# Patient Record
Sex: MALE | Race: WHITE | NOT HISPANIC OR LATINO | Employment: OTHER | URBAN - METROPOLITAN AREA
[De-identification: names, ages, dates, MRNs, and addresses within clinical notes are randomized per-mention and may not be internally consistent; named-entity substitution may affect disease eponyms.]

---

## 2017-01-09 ENCOUNTER — OUTSIDE FACILITY SERVICE (OUTPATIENT)
Dept: GASTROENTEROLOGY | Facility: CLINIC | Age: 62
End: 2017-01-09

## 2017-01-09 PROCEDURE — 45385 COLONOSCOPY W/LESION REMOVAL: CPT | Performed by: INTERNAL MEDICINE

## 2017-01-09 PROCEDURE — 88305 TISSUE EXAM BY PATHOLOGIST: CPT | Performed by: INTERNAL MEDICINE

## 2017-01-10 ENCOUNTER — LAB REQUISITION (OUTPATIENT)
Dept: LAB | Facility: HOSPITAL | Age: 62
End: 2017-01-10

## 2017-01-10 DIAGNOSIS — D12.3 BENIGN NEOPLASM OF TRANSVERSE COLON: ICD-10-CM

## 2017-01-10 DIAGNOSIS — D12.2 BENIGN NEOPLASM OF ASCENDING COLON: ICD-10-CM

## 2017-01-11 LAB
CYTO UR: NORMAL
LAB AP CASE REPORT: NORMAL
LAB AP CLINICAL INFORMATION: NORMAL
Lab: NORMAL
PATH REPORT.FINAL DX SPEC: NORMAL
PATH REPORT.GROSS SPEC: NORMAL

## 2017-01-23 ENCOUNTER — LAB (OUTPATIENT)
Dept: LAB | Facility: HOSPITAL | Age: 62
End: 2017-01-23
Attending: INTERNAL MEDICINE

## 2017-01-23 ENCOUNTER — CONSULT (OUTPATIENT)
Dept: CARDIOLOGY | Facility: CLINIC | Age: 62
End: 2017-01-23

## 2017-01-23 VITALS
DIASTOLIC BLOOD PRESSURE: 85 MMHG | HEIGHT: 68 IN | HEART RATE: 93 BPM | BODY MASS INDEX: 37.77 KG/M2 | SYSTOLIC BLOOD PRESSURE: 152 MMHG | WEIGHT: 249.2 LBS

## 2017-01-23 DIAGNOSIS — M16.0 PRIMARY OSTEOARTHRITIS OF BOTH HIPS: ICD-10-CM

## 2017-01-23 DIAGNOSIS — R06.02 SHORTNESS OF BREATH: Primary | ICD-10-CM

## 2017-01-23 DIAGNOSIS — Z91.89 AT HIGH RISK FOR PULMONARY EMBOLISM: ICD-10-CM

## 2017-01-23 DIAGNOSIS — I10 ESSENTIAL HYPERTENSION: ICD-10-CM

## 2017-01-23 DIAGNOSIS — E11.8 TYPE 2 DIABETES MELLITUS WITH COMPLICATION, WITHOUT LONG-TERM CURRENT USE OF INSULIN (HCC): ICD-10-CM

## 2017-01-23 DIAGNOSIS — Z72.0 TOBACCO ABUSE: ICD-10-CM

## 2017-01-23 DIAGNOSIS — R06.02 SHORTNESS OF BREATH: ICD-10-CM

## 2017-01-23 DIAGNOSIS — D64.9 ANEMIA, UNSPECIFIED TYPE: ICD-10-CM

## 2017-01-23 DIAGNOSIS — E66.8 MODERATE OBESITY: ICD-10-CM

## 2017-01-23 DIAGNOSIS — J44.9 CHRONIC OBSTRUCTIVE PULMONARY DISEASE, UNSPECIFIED COPD TYPE (HCC): ICD-10-CM

## 2017-01-23 PROBLEM — E66.9 MODERATE OBESITY: Status: ACTIVE | Noted: 2017-01-23

## 2017-01-23 LAB
BNP SERPL-MCNC: 230 PG/ML (ref 0–100)
D DIMER PPP FEU-MCNC: 0.33 MG/L (FEU) (ref 0–0.5)

## 2017-01-23 PROCEDURE — 85379 FIBRIN DEGRADATION QUANT: CPT | Performed by: INTERNAL MEDICINE

## 2017-01-23 PROCEDURE — 93000 ELECTROCARDIOGRAM COMPLETE: CPT | Performed by: INTERNAL MEDICINE

## 2017-01-23 PROCEDURE — 99204 OFFICE O/P NEW MOD 45 MIN: CPT | Performed by: INTERNAL MEDICINE

## 2017-01-23 PROCEDURE — 83880 ASSAY OF NATRIURETIC PEPTIDE: CPT | Performed by: INTERNAL MEDICINE

## 2017-01-23 PROCEDURE — 36415 COLL VENOUS BLD VENIPUNCTURE: CPT

## 2017-01-23 RX ORDER — NIFEDIPINE 30 MG/1
30 TABLET, EXTENDED RELEASE ORAL DAILY
COMMUNITY
End: 2020-04-21

## 2017-01-23 RX ORDER — ATENOLOL 50 MG/1
50 TABLET ORAL DAILY
COMMUNITY
End: 2017-12-16 | Stop reason: ALTCHOICE

## 2017-01-23 RX ORDER — FUROSEMIDE 40 MG/1
40 TABLET ORAL DAILY
COMMUNITY

## 2017-01-23 NOTE — PROGRESS NOTES
Subjective:     Encounter Date:01/23/2017      Patient ID: Damion Llanos is a 61 y.o.  white male, retired/disabled , from [place], [place].    REFERRING PHYSICIAN/INTERNIST:  Konstantin Palm MD  GASTROENTEROLOGIST:  Edwin Lin MD    Chief Complaint:   Chief Complaint   Patient presents with   • Leg Swelling     Problem List:   1. Recent progressive lower extremity edema of uncertain etiology:    A. Recent echocardiogram without definite RV enlargement or mention of pulmonary arterial hypertension without pericardial effusion and acceptable EKG.    B. Apparent recent Doppler imaging of lower extremities with unknown findings (autumn 2016).    C. CCS class I chest pain syndrome with NYHA class II exertional dyspnea and fatigue syndrome.   2. Type 2 diabetes mellitus, on metformin; hemoglobin A1c unknown.   3. Umbilical herniorrhaphy, data deficit.    4. Moderate obesity, BMI 37.9.   5. Severe critical anemia with recent apparent acceptable colonoscopy with polypectomy x2 (January 2017).   6. Chronic tobacco use with probable mild-moderate COPD.   7. Severe osteoarthritis involving lumbosacral and cervical spine, as well as both hips, with disability and chronic pain management.   8. Hypertension, probably essential.    No Known Allergies      Current Outpatient Prescriptions:   •  ALBUTEROL IN, Inhale 2 puffs Every 4 (Four) Hours As Needed., Disp: , Rfl:   •  aspirin 81 MG chewable tablet, Chew 81 mg Daily., Disp: , Rfl:   •  atenolol (TENORMIN) 50 MG tablet, Take 50 mg by mouth Daily., Disp: , Rfl:   •  CYCLOBENZAPRINE HCL PO, Take 10 mg by mouth 2 (Two) Times a Day., Disp: , Rfl:   •  furosemide (LASIX) 40 MG tablet, Take 40 mg by mouth 2 (Two) Times a Day., Disp: , Rfl:   •  hydrALAZINE (APRESOLINE) 50 MG tablet, Take 50 mg by mouth 3 (Three) Times a Day., Disp: , Rfl:   •  HYDROcodone-acetaminophen (NORCO)  MG per tablet, Take 1 tablet by mouth Every 6 (Six) Hours As Needed for  "moderate pain (4-6)., Disp: , Rfl:   •  lisinopril (PRINIVIL,ZESTRIL) 40 MG tablet, Take 40 mg by mouth Daily., Disp: , Rfl:   •  magnesium oxide (MAGOX) 400 (241.3 MG) MG tablet tablet, Take 400 mg by mouth 2 (Two) Times a Day As Needed., Disp: , Rfl:   •  METFORMIN HCL ER PO, Take 500 mg by mouth 2 (Two) Times a Day., Disp: , Rfl:   •  NIFEdipine XL (NIFEDICAL XL) 30 MG 24 hr tablet, Take 30 mg by mouth Daily., Disp: , Rfl:   •  omeprazole (PriLOSEC) 20 MG capsule, Take 20 mg by mouth Daily., Disp: , Rfl:   •  sildenafil (VIAGRA) 100 MG tablet, Take 100 mg by mouth Daily As Needed for erectile dysfunction., Disp: , Rfl:   •  simvastatin (ZOCOR) 20 MG tablet, Take 20 mg by mouth Every Night., Disp: , Rfl:     History of Present Illness Patient presents to our office today for a consult.  He is retired from being a  as a result of back pain and disability; he last worked approximately 8-10 years ago. He has had x-rays and is undergoing the process for disability.  He has had \"probably 4 EKGs\" in Dr. Palm's office during this process, and he has never been told of any problems on his EKG.  He goes for checkups once a month, and he has been having problems with swelling in his legs and his ankles.  He has had ultrasound of his lower extremities but is unaware of the details and date. He was then put on Lasix and potassium and had some blood work drawn that showed him to be anemic and was put on iron.  He underwent a colonoscopy, and this was found to be okay; he did have 2 polyps removed. He has been a \"smoker all my life\" and still smokes; since he was 14 years old.  He tries to take 2 days off a week but smokes three-quarters to a pack per day.  The longest time he has ever been able to quit smoking was for 2 weeks.  His wife has quit smoking as of September 2016; he notes that she has terrible problems with her back.  The patient says that he drinks beer on a daily basis, \"usually with my supper.\"  He has " not had any chest x-rays that he can recall or any lung tests.  He reports that when he was prepping for his colonoscopy and had finished drinking the prep, he went to bed and felt like he was dehydrated; he woke up the next morning and felt tightness in his chest.  He had never had this feeling in his chest before, with activity or with rest.  He notes that he is able to walk about a quarter of a mile before he has to stop due to hip pain. He has no problems with chest tightness or with shortness of breath with that limited activity.  He has no cough and no sputum production. He has had influenza immunization.  There is no prior diagnosis of sleep apnea, and he currently denies cough, sputum production, pleurisy, hemoptysis, or anginal type chest discomfort.  He states he has gained approximately 25 pounds in weight over the past 2-3 years.    Cardiovascular Disease Risk Factors  hyptertension, hyperlipidemia, diabetes mellitus, tobacco abuse, obesity, increased age, male gender    Social History     Social History   • Marital status:      Spouse name: N/A   • Number of children: N/A   • Years of education: N/A     Occupational History   • Not on file.     Social History Main Topics   • Smoking status: Current Every Day Smoker     Packs/day: 1.00   • Smokeless tobacco: Not on file   • Alcohol use 4.2 oz/week     7 Cans of beer per week   • Drug use: No   • Sexual activity: Not on file     Other Topics Concern   • Not on file     Social History Narrative   Went through 8th grade in school.    Family History   Problem Relation Age of Onset   • Family history unknown: Yes   Family history of diabetes mellitus, dyslipidemia, and hypertension.    ROS   Obtained and negative except as outlined in problem list and HPI.      ECG 12 Lead  Date/Time: 1/23/2017 2:33 PM  Performed by: TELLO BOLDEN  Authorized by: TELLO BOLDEN   Comparison: not compared with previous ECG   Rhythm: sinus tachycardia  Clinical  "impression: abnormal ECG  Comments: QTc 446  ms                Objective:       Vitals:    01/23/17 1049 01/23/17 1134 01/23/17 1136 01/23/17 1137   BP: 150/74 146/85 133/82 152/85   BP Location: Left arm  Right arm Right arm   Patient Position: Sitting  Standing Sitting   Pulse: 106 95 95 93   Weight: 249 lb 3.2 oz (113 kg)      Height: 68\" (172.7 cm)        Body mass index is 37.89 kg/(m^2).     Physical Exam   Constitutional: He appears well-developed and well-nourished.   HENT:   Head: Normocephalic and atraumatic.   Mouth/Throat: Oropharynx is clear and moist.   Edentulous   Eyes:   Fundoscopic exam:       The right eye shows AV nicking. The right eye shows no exudate.        The left eye shows AV nicking. The left eye shows no exudate.   Neck: Neck supple. No JVD present. Carotid bruit is not present. No thyromegaly present.   Cardiovascular: Normal rate and regular rhythm.  Exam reveals no gallop, no S3 and no friction rub.    Murmur heard.   Medium-pitched early systolic murmur is present with a grade of 1/6  at the lower left sternal border  Pulses:       Carotid pulses are 1+ on the right side, and 1+ on the left side.       Radial pulses are 1+ on the right side, and 1+ on the left side.        Femoral pulses are 1+ on the right side, and 1+ on the left side.       Popliteal pulses are 1+ on the right side, and 1+ on the left side.        Dorsalis pedis pulses are 1+ on the right side, and 1+ on the left side.        Posterior tibial pulses are 1+ on the right side, and 1+ on the left side.   Pulmonary/Chest: Effort normal and breath sounds normal.   Abdominal: Soft. He exhibits no mass. There is no hepatosplenomegaly. There is no tenderness.   Musculoskeletal: He exhibits edema (1+ bipedal).   Lymphadenopathy:     He has no cervical adenopathy.   Neurological: He is alert. He has normal strength. No cranial nerve deficit or sensory deficit.   Skin: Skin is warm, dry and intact.   Mild to moderate tobacco " smell       Lab Review:   Results for orders placed or performed in visit on 01/09/17   Tissue Exam   Result Value Ref Range    Case Report       Surgical Pathology Report                         Case: [id number]                                  Authorizing Provider:  Edwin Lin MD     Collected:           01/09/2017 01:31 PM          Pathologist:           Daron Wild MD       Received:            01/10/2017 08:36 AM          Specimens:   1) - Large Intestine, Right / Ascending Colon                                                       2) - Large Intestine, Transverse Colon                                                     Clinical Information       The working history is anemia, screening colonoscopy.       Final Diagnosis       1. ASCENDING COLON POLYP:  Serrated polyp with features compatible with sessile serrated adenoma.   No high-grade dysplasia identified.  2. TRANSVERSE COLON POLYP:  Serrated polyp with features compatible with sessile serrated adenoma.   No high-grade dysplasia identified.   [facility]       Gross Description       Specimen 1 received in formalin labeled as ascending colon polyp is a 0.8 x 0.6 x 0.6 cm red/brown polyp, which is bisected and submitted entirely in cassette 1.      Specimen 2 received in formalin labeled as transverse colon polyp is a 0.8 x 0.8 x 0.6 cm red/brown polyp, which is bisected and submitted entirely in cassette 2.  [facility]       Microscopic Description       The slides are reviewed and demonstrate histopathologic features supporting the above rendered diagnosis.          Embedded Images       Laboratory results, 10/26/2016:  · CBC - WBC 5.01, hemoglobin 8.2, hematocrit 28.2, platelets 444  · Ferritin - 21.00  · Iron 268, TIBC 473, iron saturation 57  · Folate - 20.35  · Vitamin B12 - 405  · CMP - glucose 89, BUN 17, creatinine 0.9, sodium 143, potassium 5.6, calcium 9.7, albumin 5.10, ALT 34, AST 39  · Transferrin Receptor - 53.4    Echocardiogram by  Dr. Korin Rinaldi, 09/29/2016:  1. Left ventricle normal size with normal wall motion.  2. Left ventricular ejection fraction 60%.  3. Left atrial dilatation.  4. Right atrium, right ventricle normal size.  5. Mitral valve normal without stenosis or regurgitation.  6. Trileaflet aortic valve without stenosis or regurgitation.  7. Tricuspid valve normal without stenosis or regurgitation.  8. Pulmonary valve normal without stenosis or regurgitation.  9. Color doppler revealed normal blood flow pattern.  10. Prominent A wave on mitral and tricuspid flow pattern suggestive of diastolic dysfunction.  11. No pericardial effusion.      Assessment:   Patient has had recent severe anemia and pedal edema; I suspect his pedal edema is multifactorial and not related to pulmonary arterial hypertension.  I suspect the anemia, his obesity, sedentary lifestyle, and nifedipine are producing pedal edema, which apparently has improved following initiation of diuretic therapy.  He has significant hypertension, which is under fair control.  It is imperative that he not smoke, and I have had a long discussion wit him about this imperative, as well as the need to consider adjunctive therapy to limit his urge, such as Chantix, Wellbutrin, or nicotine replacement products.  His echocardiogram report is not markedly abnormal, and he has no symptoms currently to suggest orthopnea,  PND, angina, or important arrhythmia.  I do feel he needs screening for obstructive sleep apnea. I do not feel at this time that he needs right heart catheterization or diagnostic coronary angiography.  We will attempt to assess for chronic thromboembolic disease, as well as interstitial lung disease, as well as COPD with thoracic CTA.  He additionally  Needs a baseline assessment of his pulmonary function with pulmonary function testing.  If his peripheral edema becomes progressive or recurrent, we will alter his nifedipine XL.  Finally, his atenolol is  relatively contraindicated with his lung dysfunction, as well also his diabetes mellitus.  Hopefully, his lipids remain under acceptable control.  Finally ,in view of his severe anemia, he needs hematology evaluation and consideration of hemoglobin electrophoresis, as well as possible bone marrow examination.  His iron hemodynamics are of concern and suggestive possible tendency for hemochromatosis; this needs to be addressed by his gastroenterologist, as well as possible hematologist.     Diagnosis Plan   1. Shortness of breath  BNP    D-dimer, Quantitative    CT chest w contrast    Pulmonary Function Test   2. At high risk for pulmonary embolism  BNP    D-dimer, Quantitative    CT chest w contrast    Pulmonary Function Test   3. Anemia, unspecified type     4. Moderate obesity     5. Tobacco abuse     6. Chronic obstructive pulmonary disease, unspecified COPD type     7. Essential hypertension     8. Primary osteoarthritis of both hips     9. Type 2 diabetes mellitus with complication, without long-term current use of insulin            Plan:   1. Continue current medications.  2. Outpatient screening for obstructive sleep apnea with nocturnal oximetry monitoring.  3. Strong consideration of hematology consultation.  4. Tobacco cessation discussed at length.  5. Moderate compression knee-high MANDY hose.  6. Attempt to obtain and review lower extremity imaging studies/reports.  7. Consideration of Prevnar immunization.  8. Following outpatient studies are recommended:   A. BNP   B. D-dimer   C. Pulmonary function test   D. Thoracic CTA  9. 1-800 card provided.  10. Return for reassessment in 3-4 weeks.        Transcribed by Tiffanie Rogers for Dr. Tobias Hennessy at 11:07 AM on 01/23/2017    ITobias MD, [facility], personally performed the services described in this documentation as scribed by the above named individual in my presence, and it is both accurate and complete. At 11:07 AM on 01/23/2017

## 2017-01-23 NOTE — MR AVS SNAPSHOT
Damion Llanos   1/23/2017 11:00 AM   Consult    Dept Phone:  [phone]   Encounter #:  [id number]    Provider:  Tobias Hennessy MD   Department:  [facility] CARDIOLOGY                Your Full Care Plan              Today's Medication Changes          These changes are accurate as of: 1/23/17 11:40 AM.  If you have any questions, ask your nurse or doctor.               Stop taking medication(s)listed here:     AMLODIPINE BESYLATE PO   Stopped by:  Tobias Hennessy MD           gabapentin 300 MG capsule   Commonly known as:  NEURONTIN   Stopped by:  Tobias Hennessy MD           glyBURIDE 5 MG tablet   Commonly known as:  DIAbeta   Stopped by:  Tobias Hennessy MD           Iron 325 (65 FE) MG tablet   Stopped by:  Tobias Hennessy MD           PA VITAMIN D-3 5000 UNITS capsule   Generic drug:  vitamin d   Stopped by:  Tobias Hennessy MD           vitamin D3 5000 UNITS capsule capsule   Stopped by:  Tobias Hennessy MD                      Your Updated Medication List          This list is accurate as of: 1/23/17 11:40 AM.  Always use your most recent med list.                ALBUTEROL IN       aspirin 81 MG chewable tablet       atenolol 50 MG tablet   Commonly known as:  TENORMIN       CYCLOBENZAPRINE HCL PO       furosemide 40 MG tablet   Commonly known as:  LASIX       hydrALAZINE 50 MG tablet   Commonly known as:  APRESOLINE       HYDROcodone-acetaminophen  MG per tablet   Commonly known as:  NORCO       lisinopril 40 MG tablet   Commonly known as:  PRINIVIL,ZESTRIL       magnesium oxide 400 (241.3 MG) MG tablet tablet   Commonly known as:  MAGOX       METFORMIN HCL ER PO       NIFEDICAL XL 30 MG 24 hr tablet   Generic drug:  NIFEdipine XL       omeprazole 20 MG capsule   Commonly known as:  priLOSEC       simvastatin 20 MG tablet   Commonly known as:  ZOCOR       VIAGRA 100 MG tablet   Generic drug:  sildenafil               You Were Diagnosed With        "   Codes Comments    Shortness of breath    -  Primary ICD-10-CM: R06.02  ICD-9-CM: 786.05     At high risk for pulmonary embolism     ICD-10-CM: Z91.89  ICD-9-CM: V49.89       Instructions     None    Patient Instructions History      Upcoming Appointments     Visit Type Date Time Department    CONSULT 1/23/2017 11:00 AM [facility]      MyChart Signup     Our records indicate that you have declined [employer] MyChart signup. If you would like to sign up for [employer], please email [email or web address] or call [phone] to obtain an activation code.             Other Info from Your Visit           Allergies     No Known Allergies      Reason for Visit     Leg Swelling           Vital Signs     Blood Pressure Pulse Height Weight Body Mass Index Smoking Status    152/85 (BP Location: Right arm, Patient Position: Sitting) 93 68\" (172.7 cm) 249 lb 3.2 oz (113 kg) 37.89 kg/m2 Current Every Day Smoker      Problems and Diagnoses Noted     Shortness of breath    -  Primary    At high risk for pulmonary embolism            "

## 2017-02-08 ENCOUNTER — APPOINTMENT (OUTPATIENT)
Dept: PULMONOLOGY | Facility: HOSPITAL | Age: 62
End: 2017-02-08
Attending: INTERNAL MEDICINE

## 2017-02-22 ENCOUNTER — HOSPITAL ENCOUNTER (OUTPATIENT)
Dept: CT IMAGING | Facility: HOSPITAL | Age: 62
Discharge: HOME | End: 2017-02-22
Attending: INTERNAL MEDICINE | Admitting: INTERNAL MEDICINE

## 2017-02-22 DIAGNOSIS — R06.02 SHORTNESS OF BREATH: ICD-10-CM

## 2017-02-22 DIAGNOSIS — Z91.89 AT HIGH RISK FOR PULMONARY EMBOLISM: ICD-10-CM

## 2017-02-22 PROCEDURE — 71260 CT THORAX DX C+: CPT

## 2017-02-22 PROCEDURE — 82565 ASSAY OF CREATININE: CPT

## 2017-02-22 PROCEDURE — 0 IOPAMIDOL PER 1 ML: Performed by: INTERNAL MEDICINE

## 2017-02-22 RX ADMIN — IOPAMIDOL 90 ML: 755 INJECTION, SOLUTION INTRAVENOUS at 16:00

## 2017-02-24 LAB — CREAT BLDA-MCNC: 1.4 MG/DL (ref 0.6–1.3)

## 2017-12-16 ENCOUNTER — APPOINTMENT (OUTPATIENT)
Dept: GENERAL RADIOLOGY | Facility: HOSPITAL | Age: 62
End: 2017-12-16

## 2017-12-16 ENCOUNTER — HOSPITAL ENCOUNTER (INPATIENT)
Facility: HOSPITAL | Age: 62
LOS: 1 days | Discharge: HOME | End: 2017-12-17
Attending: EMERGENCY MEDICINE | Admitting: FAMILY MEDICINE

## 2017-12-16 DIAGNOSIS — R60.1 ANASARCA: ICD-10-CM

## 2017-12-16 DIAGNOSIS — D50.0 CHRONIC BLOOD LOSS ANEMIA: ICD-10-CM

## 2017-12-16 DIAGNOSIS — D64.9 SYMPTOMATIC ANEMIA: Primary | ICD-10-CM

## 2017-12-16 DIAGNOSIS — D64.9 SYMPTOMATIC ANEMIA: ICD-10-CM

## 2017-12-16 PROBLEM — J44.1 CHRONIC OBSTRUCTIVE PULMONARY DISEASE WITH (ACUTE) EXACERBATION (HCC): Status: ACTIVE | Noted: 2017-01-23

## 2017-12-16 PROBLEM — R60.9 DEPENDENT EDEMA: Status: ACTIVE | Noted: 2017-12-16

## 2017-12-16 PROBLEM — Z91.89 AT HIGH RISK FOR PULMONARY EMBOLISM: Status: RESOLVED | Noted: 2017-01-23 | Resolved: 2017-12-16

## 2017-12-16 LAB
ABO GROUP BLD: NORMAL
ABO GROUP BLD: NORMAL
ALBUMIN SERPL-MCNC: 4.1 G/DL (ref 3.2–4.8)
ALBUMIN/GLOB SERPL: 1.7 G/DL (ref 1.5–2.5)
ALP SERPL-CCNC: 82 U/L (ref 25–100)
ALT SERPL W P-5'-P-CCNC: 19 U/L (ref 7–40)
ANION GAP SERPL CALCULATED.3IONS-SCNC: 7 MMOL/L (ref 3–11)
AST SERPL-CCNC: 28 U/L (ref 0–33)
BASOPHILS # BLD AUTO: 0.03 10*3/MM3 (ref 0–0.2)
BASOPHILS NFR BLD AUTO: 0.4 % (ref 0–1)
BILIRUB SERPL-MCNC: 0.4 MG/DL (ref 0.3–1.2)
BLD GP AB SCN SERPL QL: NEGATIVE
BNP SERPL-MCNC: 131 PG/ML (ref 0–100)
BUN BLD-MCNC: 22 MG/DL (ref 9–23)
BUN/CREAT SERPL: 22 (ref 7–25)
CALCIUM SPEC-SCNC: 9 MG/DL (ref 8.7–10.4)
CHLORIDE SERPL-SCNC: 103 MMOL/L (ref 99–109)
CO2 SERPL-SCNC: 26 MMOL/L (ref 20–31)
CREAT BLD-MCNC: 1 MG/DL (ref 0.6–1.3)
DEPRECATED RDW RBC AUTO: 47.5 FL (ref 37–54)
EOSINOPHIL # BLD AUTO: 0.23 10*3/MM3 (ref 0–0.3)
EOSINOPHIL NFR BLD AUTO: 3 % (ref 0–3)
ERYTHROCYTE [DISTWIDTH] IN BLOOD BY AUTOMATED COUNT: 18.2 % (ref 11.3–14.5)
FERRITIN SERPL-MCNC: 2 NG/ML (ref 22–322)
FOLATE SERPL-MCNC: 17.97 NG/ML (ref 3.2–20)
GFR SERPL CREATININE-BSD FRML MDRD: 75 ML/MIN/1.73
GLOBULIN UR ELPH-MCNC: 2.4 GM/DL
GLUCOSE BLD-MCNC: 98 MG/DL (ref 70–100)
GLUCOSE BLDC GLUCOMTR-MCNC: 119 MG/DL (ref 70–130)
GLUCOSE BLDC GLUCOMTR-MCNC: 162 MG/DL (ref 70–130)
GLUCOSE BLDC GLUCOMTR-MCNC: 200 MG/DL (ref 70–130)
HCT VFR BLD AUTO: 20.7 % (ref 38.9–50.9)
HGB BLD-MCNC: 5.2 G/DL (ref 13.1–17.5)
HOLD SPECIMEN: NORMAL
HOLD SPECIMEN: NORMAL
HYPOCHROMIA BLD QL: NORMAL
IMM GRANULOCYTES # BLD: 0.03 10*3/MM3 (ref 0–0.03)
IMM GRANULOCYTES NFR BLD: 0.4 % (ref 0–0.6)
IRON 24H UR-MRATE: 7 MCG/DL (ref 50–175)
IRON SATN MFR SERPL: 1 % (ref 20–50)
LYMPHOCYTES # BLD AUTO: 1.14 10*3/MM3 (ref 0.6–4.8)
LYMPHOCYTES NFR BLD AUTO: 14.9 % (ref 24–44)
MAGNESIUM SERPL-MCNC: 1.6 MG/DL (ref 1.3–2.7)
MCH RBC QN AUTO: 18.1 PG (ref 27–31)
MCHC RBC AUTO-ENTMCNC: 25.1 G/DL (ref 32–36)
MCV RBC AUTO: 71.9 FL (ref 80–99)
MICROCYTES BLD QL: NORMAL
MONOCYTES # BLD AUTO: 0.74 10*3/MM3 (ref 0–1)
MONOCYTES NFR BLD AUTO: 9.7 % (ref 0–12)
NEUTROPHILS # BLD AUTO: 5.47 10*3/MM3 (ref 1.5–8.3)
NEUTROPHILS NFR BLD AUTO: 71.6 % (ref 41–71)
PLAT MORPH BLD: NORMAL
PLATELET # BLD AUTO: 404 10*3/MM3 (ref 150–450)
PMV BLD AUTO: 9.9 FL (ref 6–12)
POTASSIUM BLD-SCNC: 4.5 MMOL/L (ref 3.5–5.5)
PROT SERPL-MCNC: 6.5 G/DL (ref 5.7–8.2)
RBC # BLD AUTO: 2.88 10*6/MM3 (ref 4.2–5.76)
RH BLD: POSITIVE
RH BLD: POSITIVE
SODIUM BLD-SCNC: 136 MMOL/L (ref 132–146)
TIBC SERPL-MCNC: 495 MCG/DL (ref 250–450)
TROPONIN I SERPL-MCNC: 0.04 NG/ML (ref 0–0.07)
TSH SERPL DL<=0.05 MIU/L-ACNC: 1.6 MIU/ML (ref 0.35–5.35)
VIT B12 BLD-MCNC: 461 PG/ML (ref 211–911)
WBC MORPH BLD: NORMAL
WBC NRBC COR # BLD: 7.64 10*3/MM3 (ref 3.5–10.8)
WHOLE BLOOD HOLD SPECIMEN: NORMAL
WHOLE BLOOD HOLD SPECIMEN: NORMAL

## 2017-12-16 PROCEDURE — 86901 BLOOD TYPING SEROLOGIC RH(D): CPT

## 2017-12-16 PROCEDURE — 82728 ASSAY OF FERRITIN: CPT | Performed by: FAMILY MEDICINE

## 2017-12-16 PROCEDURE — 83735 ASSAY OF MAGNESIUM: CPT | Performed by: FAMILY MEDICINE

## 2017-12-16 PROCEDURE — 86901 BLOOD TYPING SEROLOGIC RH(D): CPT | Performed by: EMERGENCY MEDICINE

## 2017-12-16 PROCEDURE — 82607 VITAMIN B-12: CPT | Performed by: FAMILY MEDICINE

## 2017-12-16 PROCEDURE — 99253 IP/OBS CNSLTJ NEW/EST LOW 45: CPT | Performed by: INTERNAL MEDICINE

## 2017-12-16 PROCEDURE — 63710000001 INSULIN LISPRO (HUMAN) PER 5 UNITS: Performed by: FAMILY MEDICINE

## 2017-12-16 PROCEDURE — 36430 TRANSFUSION BLD/BLD COMPNT: CPT

## 2017-12-16 PROCEDURE — 83550 IRON BINDING TEST: CPT | Performed by: FAMILY MEDICINE

## 2017-12-16 PROCEDURE — 86850 RBC ANTIBODY SCREEN: CPT | Performed by: EMERGENCY MEDICINE

## 2017-12-16 PROCEDURE — 85007 BL SMEAR W/DIFF WBC COUNT: CPT | Performed by: EMERGENCY MEDICINE

## 2017-12-16 PROCEDURE — P9016 RBC LEUKOCYTES REDUCED: HCPCS

## 2017-12-16 PROCEDURE — 86923 COMPATIBILITY TEST ELECTRIC: CPT

## 2017-12-16 PROCEDURE — 83036 HEMOGLOBIN GLYCOSYLATED A1C: CPT | Performed by: FAMILY MEDICINE

## 2017-12-16 PROCEDURE — 93005 ELECTROCARDIOGRAM TRACING: CPT | Performed by: EMERGENCY MEDICINE

## 2017-12-16 PROCEDURE — 85660 RBC SICKLE CELL TEST: CPT | Performed by: FAMILY MEDICINE

## 2017-12-16 PROCEDURE — 84484 ASSAY OF TROPONIN QUANT: CPT

## 2017-12-16 PROCEDURE — 82962 GLUCOSE BLOOD TEST: CPT

## 2017-12-16 PROCEDURE — 82746 ASSAY OF FOLIC ACID SERUM: CPT | Performed by: FAMILY MEDICINE

## 2017-12-16 PROCEDURE — 83540 ASSAY OF IRON: CPT | Performed by: FAMILY MEDICINE

## 2017-12-16 PROCEDURE — 83021 HEMOGLOBIN CHROMOTOGRAPHY: CPT | Performed by: FAMILY MEDICINE

## 2017-12-16 PROCEDURE — 99223 1ST HOSP IP/OBS HIGH 75: CPT | Performed by: FAMILY MEDICINE

## 2017-12-16 PROCEDURE — 83880 ASSAY OF NATRIURETIC PEPTIDE: CPT | Performed by: EMERGENCY MEDICINE

## 2017-12-16 PROCEDURE — 80050 GENERAL HEALTH PANEL: CPT | Performed by: EMERGENCY MEDICINE

## 2017-12-16 PROCEDURE — 36415 COLL VENOUS BLD VENIPUNCTURE: CPT

## 2017-12-16 PROCEDURE — 99284 EMERGENCY DEPT VISIT MOD MDM: CPT

## 2017-12-16 PROCEDURE — 86900 BLOOD TYPING SEROLOGIC ABO: CPT | Performed by: EMERGENCY MEDICINE

## 2017-12-16 PROCEDURE — 71020 HC CHEST PA AND LATERAL: CPT

## 2017-12-16 PROCEDURE — 86900 BLOOD TYPING SEROLOGIC ABO: CPT

## 2017-12-16 RX ORDER — CARVEDILOL 12.5 MG/1
12.5 TABLET ORAL 2 TIMES DAILY WITH MEALS
COMMUNITY

## 2017-12-16 RX ORDER — FUROSEMIDE 40 MG/1
40 TABLET ORAL 2 TIMES DAILY
Status: DISCONTINUED | OUTPATIENT
Start: 2017-12-16 | End: 2017-12-17 | Stop reason: HOSPADM

## 2017-12-16 RX ORDER — IPRATROPIUM BROMIDE AND ALBUTEROL SULFATE 2.5; .5 MG/3ML; MG/3ML
3 SOLUTION RESPIRATORY (INHALATION) 4 TIMES DAILY PRN
Status: DISCONTINUED | OUTPATIENT
Start: 2017-12-16 | End: 2017-12-17 | Stop reason: HOSPADM

## 2017-12-16 RX ORDER — ACETAMINOPHEN, ASPIRIN AND CAFFEINE 250; 250; 65 MG/1; MG/1; MG/1
1 TABLET, FILM COATED ORAL EVERY 6 HOURS PRN
COMMUNITY
End: 2017-12-17 | Stop reason: HOSPADM

## 2017-12-16 RX ORDER — SODIUM CHLORIDE 0.9 % (FLUSH) 0.9 %
1-10 SYRINGE (ML) INJECTION AS NEEDED
Status: DISCONTINUED | OUTPATIENT
Start: 2017-12-16 | End: 2017-12-17 | Stop reason: HOSPADM

## 2017-12-16 RX ORDER — CYCLOBENZAPRINE HCL 10 MG
10 TABLET ORAL 3 TIMES DAILY PRN
COMMUNITY
End: 2020-04-21

## 2017-12-16 RX ORDER — PROMETHAZINE HYDROCHLORIDE 12.5 MG/1
12.5 TABLET ORAL EVERY 6 HOURS PRN
Status: DISCONTINUED | OUTPATIENT
Start: 2017-12-16 | End: 2017-12-17 | Stop reason: HOSPADM

## 2017-12-16 RX ORDER — SODIUM CHLORIDE 0.9 % (FLUSH) 0.9 %
10 SYRINGE (ML) INJECTION AS NEEDED
Status: DISCONTINUED | OUTPATIENT
Start: 2017-12-16 | End: 2017-12-17 | Stop reason: HOSPADM

## 2017-12-16 RX ORDER — PROMETHAZINE HYDROCHLORIDE 12.5 MG/1
12.5 SUPPOSITORY RECTAL EVERY 6 HOURS PRN
Status: DISCONTINUED | OUTPATIENT
Start: 2017-12-16 | End: 2017-12-17 | Stop reason: HOSPADM

## 2017-12-16 RX ORDER — NICOTINE POLACRILEX 4 MG
15 LOZENGE BUCCAL
Status: DISCONTINUED | OUTPATIENT
Start: 2017-12-16 | End: 2017-12-17 | Stop reason: HOSPADM

## 2017-12-16 RX ORDER — HYDRALAZINE HYDROCHLORIDE 50 MG/1
50 TABLET, FILM COATED ORAL 3 TIMES DAILY
Status: DISCONTINUED | OUTPATIENT
Start: 2017-12-16 | End: 2017-12-17 | Stop reason: HOSPADM

## 2017-12-16 RX ORDER — PANTOPRAZOLE SODIUM 40 MG/1
40 TABLET, DELAYED RELEASE ORAL EVERY MORNING
Status: DISCONTINUED | OUTPATIENT
Start: 2017-12-17 | End: 2017-12-16

## 2017-12-16 RX ORDER — BISACODYL 10 MG
10 SUPPOSITORY, RECTAL RECTAL DAILY PRN
Status: DISCONTINUED | OUTPATIENT
Start: 2017-12-16 | End: 2017-12-17 | Stop reason: HOSPADM

## 2017-12-16 RX ORDER — LISINOPRIL 40 MG/1
40 TABLET ORAL DAILY
Status: DISCONTINUED | OUTPATIENT
Start: 2017-12-16 | End: 2017-12-17 | Stop reason: HOSPADM

## 2017-12-16 RX ORDER — DEXTROSE MONOHYDRATE 25 G/50ML
25 INJECTION, SOLUTION INTRAVENOUS
Status: DISCONTINUED | OUTPATIENT
Start: 2017-12-16 | End: 2017-12-17 | Stop reason: HOSPADM

## 2017-12-16 RX ORDER — BISACODYL 5 MG/1
5 TABLET, DELAYED RELEASE ORAL DAILY PRN
Status: DISCONTINUED | OUTPATIENT
Start: 2017-12-16 | End: 2017-12-17 | Stop reason: HOSPADM

## 2017-12-16 RX ORDER — HYDROCODONE BITARTRATE AND ACETAMINOPHEN 10; 325 MG/1; MG/1
1 TABLET ORAL EVERY 6 HOURS PRN
Status: DISCONTINUED | OUTPATIENT
Start: 2017-12-16 | End: 2017-12-17 | Stop reason: HOSPADM

## 2017-12-16 RX ORDER — ATORVASTATIN CALCIUM 10 MG/1
10 TABLET, FILM COATED ORAL NIGHTLY
Status: DISCONTINUED | OUTPATIENT
Start: 2017-12-16 | End: 2017-12-17 | Stop reason: HOSPADM

## 2017-12-16 RX ORDER — NICOTINE 21 MG/24HR
1 PATCH, TRANSDERMAL 24 HOURS TRANSDERMAL EVERY 24 HOURS
Status: DISCONTINUED | OUTPATIENT
Start: 2017-12-16 | End: 2017-12-17 | Stop reason: HOSPADM

## 2017-12-16 RX ORDER — PROMETHAZINE HYDROCHLORIDE 25 MG/ML
12.5 INJECTION, SOLUTION INTRAMUSCULAR; INTRAVENOUS EVERY 6 HOURS PRN
Status: DISCONTINUED | OUTPATIENT
Start: 2017-12-16 | End: 2017-12-17 | Stop reason: HOSPADM

## 2017-12-16 RX ORDER — CALCIUM CARBONATE 200(500)MG
2 TABLET,CHEWABLE ORAL 2 TIMES DAILY PRN
Status: DISCONTINUED | OUTPATIENT
Start: 2017-12-16 | End: 2017-12-17 | Stop reason: HOSPADM

## 2017-12-16 RX ORDER — ALBUTEROL SULFATE 90 UG/1
2 AEROSOL, METERED RESPIRATORY (INHALATION) EVERY 4 HOURS PRN
COMMUNITY

## 2017-12-16 RX ORDER — CYCLOBENZAPRINE HCL 10 MG
10 TABLET ORAL 3 TIMES DAILY PRN
Status: DISCONTINUED | OUTPATIENT
Start: 2017-12-16 | End: 2017-12-17 | Stop reason: HOSPADM

## 2017-12-16 RX ORDER — PANTOPRAZOLE SODIUM 40 MG/1
80 TABLET, DELAYED RELEASE ORAL
Status: DISCONTINUED | OUTPATIENT
Start: 2017-12-16 | End: 2017-12-17 | Stop reason: HOSPADM

## 2017-12-16 RX ORDER — CARVEDILOL 12.5 MG/1
12.5 TABLET ORAL 2 TIMES DAILY WITH MEALS
Status: DISCONTINUED | OUTPATIENT
Start: 2017-12-16 | End: 2017-12-17 | Stop reason: HOSPADM

## 2017-12-16 RX ADMIN — CARVEDILOL 12.5 MG: 12.5 TABLET, FILM COATED ORAL at 17:39

## 2017-12-16 RX ADMIN — HYDROCODONE BITARTRATE AND ACETAMINOPHEN 1 TABLET: 10; 325 TABLET ORAL at 20:43

## 2017-12-16 RX ADMIN — INSULIN LISPRO 3 UNITS: 100 INJECTION, SOLUTION INTRAVENOUS; SUBCUTANEOUS at 17:39

## 2017-12-16 RX ADMIN — FUROSEMIDE 40 MG: 40 TABLET ORAL at 17:39

## 2017-12-16 RX ADMIN — INSULIN LISPRO 2 UNITS: 100 INJECTION, SOLUTION INTRAVENOUS; SUBCUTANEOUS at 20:42

## 2017-12-16 RX ADMIN — Medication 400 MG: at 17:53

## 2017-12-16 RX ADMIN — NICOTINE 1 PATCH: 21 PATCH, EXTENDED RELEASE TRANSDERMAL at 14:49

## 2017-12-16 RX ADMIN — HYDRALAZINE HYDROCHLORIDE 50 MG: 50 TABLET, FILM COATED ORAL at 20:42

## 2017-12-16 RX ADMIN — PANTOPRAZOLE SODIUM 80 MG: 40 TABLET, DELAYED RELEASE ORAL at 17:53

## 2017-12-16 RX ADMIN — HYDRALAZINE HYDROCHLORIDE 50 MG: 50 TABLET, FILM COATED ORAL at 14:52

## 2017-12-16 RX ADMIN — LISINOPRIL 40 MG: 40 TABLET ORAL at 14:49

## 2017-12-16 RX ADMIN — ATORVASTATIN CALCIUM 10 MG: 10 TABLET, FILM COATED ORAL at 20:42

## 2017-12-16 NOTE — H&P
"    [facility] Medicine Services  HISTORY AND PHYSICAL    Patient Name: Damion Llanos  : 1955  MRN: [id number]  Primary Care Physician: Konstantin Palm MD    Subjective   Subjective     Chief Complaint:  SOA, fatigue    HPI:  Damion Llanos is a very pleasant 62 y.o.  male with PMH significant for iron deficiency anemia, HTN, T2DM on metformin, OA of hips and tobacco abuse.  Also listed on his chart is COPD, but during the history the patient states that diagnosis has never really been made.  The patient came in with a long term history of significant WEST, but has been getting steadily worse for the last month, and even worse this morning to the point that he came in to be seen.  I see that the patient had a partial workup for significant anemia in  into  including a colonoscopy performed by Dr. Lin in which 2 polyps were removed (see pathology 2017).  He also saw Dr. Hennessy 17 who saw no cardiopulmonary cause for his anemia and suggested consideration of H/O consultation.  Further workup was deferred by the patient due to his wife's illness - she in fact is an inpatient at [facility] currently.  Additionally, the patient has had increased cough with his WEST but no increase production in sputum.  He does still smoke about 15 cigarettes per day.  He denies syncope, headache, chest pain, abdominal pain, N/V/diarrhea, melena, hematochezia, skin problems.  He does have muscle cramps/finger \"drawing\" and significant leg edema bilaterally.      Last echo was 2016 revealing EF 60% and some diastolic dysfunction.    Review of Systems   Otherwise 10-system ROS reviewed and is negative except as mentioned in the HPI.    Personal History     Past Medical History:   Diagnosis Date   • Anemia    • Arthritis    • Diabetes mellitus    • GERD (gastroesophageal reflux disease)    • Hyperlipidemia    • Hypertension        Past Surgical History:   Procedure Laterality Date   • " HERNIA REPAIR         Family History: Family history is unknown by patient.     Social History:  reports that he has been smoking Cigarettes.  He has been smoking about 0.50 packs per day. He has never used smokeless tobacco. He reports that he drinks about 4.2 oz of alcohol per week  He reports that he does not use illicit drugs.    Medications:  Prescriptions Prior to Admission   Medication Sig Dispense Refill Last Dose   • albuterol (PROVENTIL HFA;VENTOLIN HFA) 108 (90 Base) MCG/ACT inhaler Inhale 2 puffs Every 4 (Four) Hours As Needed for Wheezing.      • aspirin 81 MG chewable tablet Chew 81 mg Daily.   12/16/2017   • aspirin-acetaminophen-caffeine (EXCEDRIN MIGRAINE) 250-250-65 MG per tablet Take 1 tablet by mouth Every 6 (Six) Hours As Needed for Headache.      • carvedilol (COREG) 12.5 MG tablet Take 12.5 mg by mouth 2 (Two) Times a Day With Meals.   12/16/2017   • cyclobenzaprine (FLEXERIL) 10 MG tablet Take 10 mg by mouth 3 (Three) Times a Day As Needed for Muscle Spasms.      • furosemide (LASIX) 40 MG tablet Take 40 mg by mouth 2 (Two) Times a Day.   12/16/2017   • hydrALAZINE (APRESOLINE) 50 MG tablet Take 50 mg by mouth 3 (Three) Times a Day.   12/16/2017   • HYDROcodone-acetaminophen (NORCO)  MG per tablet Take 1 tablet by mouth Every 6 (Six) Hours As Needed for moderate pain (4-6).   12/16/2017   • lisinopril (PRINIVIL,ZESTRIL) 40 MG tablet Take 40 mg by mouth Daily.   12/16/2017   • magnesium oxide (MAGOX) 400 (241.3 MG) MG tablet tablet Take 400 mg by mouth 2 (Two) Times a Day.   12/16/2017   • metFORMIN (GLUCOPHAGE) 500 MG tablet Take 500 mg by mouth 2 (Two) Times a Day With Meals.   12/16/2017   • NIFEdipine XL (NIFEDICAL XL) 30 MG 24 hr tablet Take 30 mg by mouth Daily.   12/15/2017   • omeprazole (PriLOSEC) 20 MG capsule Take 20 mg by mouth Daily.   12/16/2017   • simvastatin (ZOCOR) 20 MG tablet Take 20 mg by mouth Every Night.   12/15/2017       No Known Allergies    Objective    Objective     Vital Signs:   Temp:  [97.5 °F (36.4 °C)-98.6 °F (37 °C)] 97.5 °F (36.4 °C)  Heart Rate:  [76-88] 88  Resp:  [18-30] 18  BP: (112-144)/(62-80) 144/69        Physical Exam   Constitutional: Mild dyspnea at rest with conversation, awake, alert  Eyes: PERRLA, sclerae anicteric, no conjunctival injection  HENT: NCAT, mucous membranes moist  Neck: Supple, no thyromegaly, no lymphadenopathy, trachea midline  Respiratory: Clear to auscultation bilaterally, nonlabored respirations   Cardiovascular: RRR, palpable pedal pulses bilaterally  Gastrointestinal: Positive bowel sounds, soft, nontender, obese  Musculoskeletal: 3+ bilateral ankle edema, no clubbing or cyanosis to extremities  Psychiatric: Appropriate affect, cooperative  Neurologic: Oriented x 3, strength symmetric in all extremities, Cranial Nerves grossly intact to confrontation, speech clear  Skin: Very pale      Results Reviewed:  I have personally reviewed current lab, radiology, and data and agree.      Results from last 7 days  Lab Units 12/16/17  1045   WBC 10*3/mm3 7.64   HEMOGLOBIN g/dL 5.2*   HEMATOCRIT % 20.7*   PLATELETS 10*3/mm3 404       Results from last 7 days  Lab Units 12/16/17  1045   SODIUM mmol/L 136   POTASSIUM mmol/L 4.5   CHLORIDE mmol/L 103   CO2 mmol/L 26.0   BUN mg/dL 22   CREATININE mg/dL 1.00   GLUCOSE mg/dL 98   CALCIUM mg/dL 9.0   ALT (SGPT) U/L 19   AST (SGOT) U/L 28     BNP   Date Value Ref Range Status   12/16/2017 131.0 (H) 0.0 - 100.0 pg/mL Final     No results found for: PHART  Imaging Results (last 24 hours)     Procedure Component Value Units Date/Time    XR Chest 2 View [[id number]] Collected:  12/16/17 1404     Updated:  12/16/17 1427    Narrative:          EXAMINATION: XR CHEST 2 VIEWS - 12/16/2017     INDICATION: Dyspnea, progressive peripheral edema.     COMPARISON: None.     FINDINGS: The heart is slightly large. There is mild central vascular  congestion. There is no consolidation, mass or effusion.            Impression:       Cardiomegaly with mild central vascular congestion.     DICTATED:     12/16/2017  EDITED:          12/16/2017           This report was finalized on 12/16/2017 2:24 PM by Dr. Ludwig Doyle MD.                Assessment/Plan   Assessment / Plan     Hospital Problem List     Tobacco abuse    Chronic obstructive pulmonary disease with (acute) exacerbation    Essential hypertension    Type 2 diabetes mellitus with complication, without long-term current use of insulin    severe iron deficiency anemia    Dependent edema        Assessment & Plan:  Mr. Llanos is a pleasant 62 year old  gentleman with severe iron deficiency anemia with unrevealing colonoscopy in January 2017, and dyspnea likely due to acute exacerbation of COPD in addition to symptomatic anemia.      Symptomatic, severe iron deficiency anemia:  --Anemia labs show iron deficiency  --Guiac pending  --Colonoscopy January earlier this year unrevealing  --Has not had EGD  --GI consultation  --NPO after midnight  --HGB electropheresis  --Protinix 80 BID PO because does not seem to be acute (considering shortage of IV)    Likely COPD with acute exacerbation:  --NEBS  --Will defer steroids due to ongoing anemia, GI risk    Edema:  --Likely related to profound anemia  --Check ECHO  --TSH ok  --Continue Lasix (may be able to decrease after transfusion)    T2DM:  --Hold metformin  --SSI and Accuchecks  --Check A1C    HTN:  --Home meds with hold parameters    DVT prophylaxis:    CODE STATUS:  Full Code    INPATIENT status due to the need for care which can only be reasonably provided in an hospital setting such as expedited consultation services, close physician monitoring and/or the possible need for procedures.  In such, I feel patient’s risk for adverse outcomes and need for care warrant INPATIENT evaluation and predict the patient’s care encounter to likely last beyond 2 midnights.      Latesha Sandra MD   12/16/17   3:39  PM

## 2017-12-16 NOTE — PAYOR COMM NOTE
"Chris Llanos (62 y.o. Male) INITIAL NOTIFICATION AND CLINICALS.    Date of Birth Social Security Number Address Home Phone MRN    1955  [address] [id number] [phone] [id number]    [employer] Marital Status          None        Admission Date Admission Type Admitting Provider Attending Provider Department, Room/Bed    12/16/17 Emergency Latesha Sandra MD Beck, Jennifer L, MD [facility] 2F, S203/1    Discharge Date Discharge Disposition Discharge Destination                      Attending Provider: Latesha Sandra MD     Allergies:  No Known Allergies    Isolation:  None   Infection:  None   Code Status:  FULL    Ht:  172.7 cm (68\")   Wt:  118 kg (260 lb)    Admission Cmt:  None   Principal Problem:  None                Active Insurance as of 12/16/2017     Primary Coverage     Payor Plan Insurance Group Employer/Plan Group    [employer] [place] [employer] [place]ICA[place]      Payor Plan Address Payor Plan Phone Number Effective From Effective To    [address] [address] [phone] 9/30/2016     [place] [address]       Subscriber Name Subscriber Birth Date Member ID       CHRIS LLANOS 1955 [id number]                 Emergency Contacts      (Rel.) Home Phone Work Phone Mobile Phone    Charisse Llanos (Spouse) [phone] -- --            Problem List           Codes Noted - Resolved       Hospital    severe iron deficiency anemia ICD-10-CM: D64.9  ICD-9-CM: 285.9 12/16/2017 - Present    Dependent edema ICD-10-CM: R60.9  ICD-9-CM: 782.3 12/16/2017 - Present    Tobacco abuse ICD-10-CM: Z72.0  ICD-9-CM: 305.1 1/23/2017 - Present    Chronic obstructive pulmonary disease with (acute) exacerbation ICD-10-CM: J44.1  ICD-9-CM: 491.21 1/23/2017 - Present    Essential hypertension ICD-10-CM: I10  ICD-9-CM: 401.9 1/23/2017 - Present    Type 2 diabetes mellitus with complication, without long-term current use of insulin ICD-10-CM: E11.8  ICD-9-CM: 250.90 1/23/2017 - " "Present       Non-Hospital    Shortness of breath ICD-10-CM: R06.02  ICD-9-CM: 786.05 2017 - Present    Anemia ICD-10-CM: D64.9  ICD-9-CM: 285.9 2017 - Present    Moderate obesity ICD-10-CM: E66.8  ICD-9-CM: 278.00 2017 - Present    Primary osteoarthritis of both hips ICD-10-CM: M16.0  ICD-9-CM: 715.15 2017 - Present             History & Physical      Latesha Sandra MD at 2017  3:21 PM              [facility]  HISTORY AND PHYSICAL    Patient Name: Damion Llanos  : 1955  MRN: [id number]  Primary Care Physician: Konstantin Palm MD    Subjective   Subjective     Chief Complaint:  SOA, fatigue    HPI:  Damion Llanos is a very pleasant 62 y.o.  male with PMH significant for iron deficiency anemia, HTN, T2DM on metformin, OA of hips and tobacco abuse.  Also listed on his chart is COPD, but during the history the patient states that diagnosis has never really been made.  The patient came in with a long term history of significant WEST, but has been getting steadily worse for the last month, and even worse this morning to the point that he came in to be seen.  I see that the patient had a partial workup for significant anemia in  into  including a colonoscopy performed by Dr. Lin in which 2 polyps were removed (see pathology 2017).  He also saw Dr. Hennessy 17 who saw no cardiopulmonary cause for his anemia and suggested consideration of H/O consultation.  Further workup was deferred by the patient due to his wife's illness - she in fact is an inpatient at [facility] currently.  Additionally, the patient has had increased cough with his WEST but no increase production in sputum.  He does still smoke about 15 cigarettes per day.  He denies syncope, headache, chest pain, abdominal pain, N/V/diarrhea, melena, hematochezia, skin problems.  He does have muscle cramps/finger \"drawing\" and significant leg edema bilaterally.      Last " echo was 9/2016 revealing EF 60% and some diastolic dysfunction.    Review of Systems   Otherwise 10-system ROS reviewed and is negative except as mentioned in the HPI.    Personal History     Past Medical History:   Diagnosis Date   • Anemia    • Arthritis    • Diabetes mellitus    • GERD (gastroesophageal reflux disease)    • Hyperlipidemia    • Hypertension        Past Surgical History:   Procedure Laterality Date   • HERNIA REPAIR         Family History: Family history is unknown by patient.     Social History:  reports that he has been smoking Cigarettes.  He has been smoking about 0.50 packs per day. He has never used smokeless tobacco. He reports that he drinks about 4.2 oz of alcohol per week  He reports that he does not use illicit drugs.    Medications:  Prescriptions Prior to Admission   Medication Sig Dispense Refill Last Dose   • albuterol (PROVENTIL HFA;VENTOLIN HFA) 108 (90 Base) MCG/ACT inhaler Inhale 2 puffs Every 4 (Four) Hours As Needed for Wheezing.      • aspirin 81 MG chewable tablet Chew 81 mg Daily.   12/16/2017   • aspirin-acetaminophen-caffeine (EXCEDRIN MIGRAINE) 250-250-65 MG per tablet Take 1 tablet by mouth Every 6 (Six) Hours As Needed for Headache.      • carvedilol (COREG) 12.5 MG tablet Take 12.5 mg by mouth 2 (Two) Times a Day With Meals.   12/16/2017   • cyclobenzaprine (FLEXERIL) 10 MG tablet Take 10 mg by mouth 3 (Three) Times a Day As Needed for Muscle Spasms.      • furosemide (LASIX) 40 MG tablet Take 40 mg by mouth 2 (Two) Times a Day.   12/16/2017   • hydrALAZINE (APRESOLINE) 50 MG tablet Take 50 mg by mouth 3 (Three) Times a Day.   12/16/2017   • HYDROcodone-acetaminophen (NORCO)  MG per tablet Take 1 tablet by mouth Every 6 (Six) Hours As Needed for moderate pain (4-6).   12/16/2017   • lisinopril (PRINIVIL,ZESTRIL) 40 MG tablet Take 40 mg by mouth Daily.   12/16/2017   • magnesium oxide (MAGOX) 400 (241.3 MG) MG tablet tablet Take 400 mg by mouth 2 (Two) Times a  Day.   12/16/2017   • metFORMIN (GLUCOPHAGE) 500 MG tablet Take 500 mg by mouth 2 (Two) Times a Day With Meals.   12/16/2017   • NIFEdipine XL (NIFEDICAL XL) 30 MG 24 hr tablet Take 30 mg by mouth Daily.   12/15/2017   • omeprazole (PriLOSEC) 20 MG capsule Take 20 mg by mouth Daily.   12/16/2017   • simvastatin (ZOCOR) 20 MG tablet Take 20 mg by mouth Every Night.   12/15/2017       No Known Allergies    Objective   Objective     Vital Signs:   Temp:  [97.5 °F (36.4 °C)-98.6 °F (37 °C)] 97.5 °F (36.4 °C)  Heart Rate:  [76-88] 88  Resp:  [18-30] 18  BP: (112-144)/(62-80) 144/69        Physical Exam   Constitutional: Mild dyspnea at rest with conversation, awake, alert  Eyes: PERRLA, sclerae anicteric, no conjunctival injection  HENT: NCAT, mucous membranes moist  Neck: Supple, no thyromegaly, no lymphadenopathy, trachea midline  Respiratory: Clear to auscultation bilaterally, nonlabored respirations   Cardiovascular: RRR, palpable pedal pulses bilaterally  Gastrointestinal: Positive bowel sounds, soft, nontender, obese  Musculoskeletal: 3+ bilateral ankle edema, no clubbing or cyanosis to extremities  Psychiatric: Appropriate affect, cooperative  Neurologic: Oriented x 3, strength symmetric in all extremities, Cranial Nerves grossly intact to confrontation, speech clear  Skin: Very pale      Results Reviewed:  I have personally reviewed current lab, radiology, and data and agree.      Results from last 7 days  Lab Units 12/16/17  1045   WBC 10*3/mm3 7.64   HEMOGLOBIN g/dL 5.2*   HEMATOCRIT % 20.7*   PLATELETS 10*3/mm3 404       Results from last 7 days  Lab Units 12/16/17  1045   SODIUM mmol/L 136   POTASSIUM mmol/L 4.5   CHLORIDE mmol/L 103   CO2 mmol/L 26.0   BUN mg/dL 22   CREATININE mg/dL 1.00   GLUCOSE mg/dL 98   CALCIUM mg/dL 9.0   ALT (SGPT) U/L 19   AST (SGOT) U/L 28     BNP   Date Value Ref Range Status   12/16/2017 131.0 (H) 0.0 - 100.0 pg/mL Final     No results found for: PHART  Imaging Results (last 24  hours)     Procedure Component Value Units Date/Time    XR Chest 2 View [[id number]] Collected:  12/16/17 1404     Updated:  12/16/17 1427    Narrative:          EXAMINATION: XR CHEST 2 VIEWS - 12/16/2017     INDICATION: Dyspnea, progressive peripheral edema.     COMPARISON: None.     FINDINGS: The heart is slightly large. There is mild central vascular  congestion. There is no consolidation, mass or effusion.           Impression:       Cardiomegaly with mild central vascular congestion.     DICTATED:     12/16/2017  EDITED:          12/16/2017           This report was finalized on 12/16/2017 2:24 PM by Dr. Ludwig Doyle MD.                Assessment/Plan   Assessment / Plan     Hospital Problem List     Tobacco abuse    Chronic obstructive pulmonary disease with (acute) exacerbation    Essential hypertension    Type 2 diabetes mellitus with complication, without long-term current use of insulin    severe iron deficiency anemia    Dependent edema        Assessment & Plan:  Mr. Llanos is a pleasant 62 year old  gentleman with severe iron deficiency anemia with unrevealing colonoscopy in January 2017, and dyspnea likely due to acute exacerbation of COPD in addition to symptomatic anemia.      Symptomatic, severe iron deficiency anemia:  --Anemia labs show iron deficiency  --Guiac pending  --Colonoscopy January earlier this year unrevealing  --Has not had EGD  --GI consultation  --NPO after midnight  --HGB electropheresis  --Protinix 80 BID PO because does not seem to be acute (considering shortage of IV)    Likely COPD with acute exacerbation:  --NEBS  --Will defer steroids due to ongoing anemia, GI risk    Edema:  --Likely related to profound anemia  --Check ECHO  --TSH ok  --Continue Lasix (may be able to decrease after transfusion)    T2DM:  --Hold metformin  --SSI and Accuchecks  --Check A1C    HTN:  --Home meds with hold parameters    DVT prophylaxis:    CODE STATUS:  Full Code    INPATIENT status due  to the need for care which can only be reasonably provided in an hospital setting such as expedited consultation services, close physician monitoring and/or the possible need for procedures.  In such, I feel patient’s risk for adverse outcomes and need for care warrant INPATIENT evaluation and predict the patient’s care encounter to likely last beyond 2 midnights.      Latesha Sandra MD   12/16/17   3:39 PM           Electronically signed by Latesha Sandra MD at 12/16/2017  5:00 PM        Emergency Department Notes     No notes of this type exist for this encounter.        Vital Signs (last 24 hours)       12/15 0700  -  12/16 0659 12/16 0700  -  12/16 1701   Most Recent    Temp (°F)   97.5 -  98.6     97.9 (36.6)    Heart Rate   76 -  88     85    Resp   18 -  (!)30     18    BP   112/62 -  144/69     135/71    SpO2 (%)   (!)85 -  98     94          Hospital Medications (all)       Dose Frequency Start End    atorvastatin (LIPITOR) tablet 10 mg 10 mg Nightly 12/16/2017     Sig - Route: Take 1 tablet by mouth Every Night. - Oral    bisacodyl (DULCOLAX) EC tablet 5 mg 5 mg Daily PRN 12/16/2017     Sig - Route: Take 1 tablet by mouth Daily As Needed for Constipation. - Oral    bisacodyl (DULCOLAX) suppository 10 mg 10 mg Daily PRN 12/16/2017     Sig - Route: Insert 1 suppository into the rectum Daily As Needed for Constipation. - Rectal    calcium carbonate (TUMS) chewable tablet 500 mg (200 mg elemental) 2 tablet 2 Times Daily PRN 12/16/2017     Sig - Route: Chew 1,000 mg 2 (Two) Times a Day As Needed for Heartburn. - Oral    carvedilol (COREG) tablet 12.5 mg 12.5 mg 2 Times Daily With Meals 12/16/2017     Sig - Route: Take 1 tablet by mouth 2 (Two) Times a Day With Meals. - Oral    cyclobenzaprine (FLEXERIL) tablet 10 mg 10 mg 3 Times Daily PRN 12/16/2017     Sig - Route: Take 1 tablet by mouth 3 (Three) Times a Day As Needed for Muscle Spasms. - Oral    dextrose (D50W) solution 25 g 25 g Every 15 Minutes PRN  12/16/2017     Sig - Route: Infuse 50 mL into a venous catheter Every 15 (Fifteen) Minutes As Needed for Low Blood Sugar (Blood Sugar Less Than 70, Patient Has IV Access - Unresponsive, NPO or Unable To Safely Swallow). - Intravenous    dextrose (GLUTOSE) oral gel 15 g 15 g Every 15 Minutes PRN 12/16/2017     Sig - Route: Take 15 g by mouth Every 15 (Fifteen) Minutes As Needed for Low Blood Sugar (Blood Sugar Less Than 70, Patient Alert, Is Not NPO & Can Safely Swallow). - Oral    furosemide (LASIX) tablet 40 mg 40 mg 2 Times Daily 12/16/2017     Sig - Route: Take 1 tablet by mouth 2 (Two) Times a Day. - Oral    glucagon (human recombinant) (GLUCAGEN DIAGNOSTIC) injection 1 mg 1 mg Every 15 Minutes PRN 12/16/2017     Sig - Route: Inject 1 mg under the skin Every 15 (Fifteen) Minutes As Needed (Blood Glucose Less Than 70 - Patient Without IV Access - Unresponsive, NPO or Unable To Safely Swallow). - Subcutaneous    hydrALAZINE (APRESOLINE) tablet 50 mg 50 mg 3 Times Daily 12/16/2017     Sig - Route: Take 1 tablet by mouth 3 (Three) Times a Day. - Oral    HYDROcodone-acetaminophen (NORCO)  MG per tablet 1 tablet 1 tablet Every 6 Hours PRN 12/16/2017     Sig - Route: Take 1 tablet by mouth Every 6 (Six) Hours As Needed for Moderate Pain . - Oral    insulin lispro (humaLOG) injection 0-7 Units 0-7 Units 4 Times Daily With Meals & Nightly 12/16/2017     Sig - Route: Inject 0-7 Units under the skin 4 (Four) Times a Day With Meals & at Bedtime. - Subcutaneous    ipratropium-albuterol (DUO-NEB) nebulizer solution 3 mL 3 mL 4 Times Daily PRN 12/16/2017     Sig - Route: Take 3 mL by nebulization 4 (Four) Times a Day As Needed for Shortness of Air. - Nebulization    lisinopril (PRINIVIL,ZESTRIL) tablet 40 mg 40 mg Daily 12/16/2017     Sig - Route: Take 1 tablet by mouth Daily. - Oral    magnesium oxide (MAGOX) tablet 400 mg 400 mg 2 Times Daily 12/16/2017     Sig - Route: Take 1 tablet by mouth 2 (Two) Times a Day. -  "Oral    nicotine (NICODERM CQ) 21 MG/24HR patch 1 patch 1 patch Every 24 Hours 12/16/2017     Sig - Route: Place 1 patch on the skin Daily. - Transdermal    pantoprazole (PROTONIX) EC tablet 80 mg 80 mg 2 Times Daily Before Meals 12/16/2017     Sig - Route: Take 2 tablets by mouth 2 (Two) Times a Day Before Meals. - Oral    promethazine (PHENERGAN) injection 12.5 mg 12.5 mg Every 6 Hours PRN 12/16/2017     Sig - Route: Inject 0.5 mL into the shoulder, thigh, or buttocks Every 6 (Six) Hours As Needed for Nausea or Vomiting. - Intramuscular    Linked Group 1:  \"Or\" Linked Group Details        promethazine (PHENERGAN) suppository 12.5 mg 12.5 mg Every 6 Hours PRN 12/16/2017     Sig - Route: Insert 1 suppository into the rectum Every 6 (Six) Hours As Needed for Nausea or Vomiting. - Rectal    Linked Group 1:  \"Or\" Linked Group Details        promethazine (PHENERGAN) tablet 12.5 mg 12.5 mg Every 6 Hours PRN 12/16/2017     Sig - Route: Take 1 tablet by mouth Every 6 (Six) Hours As Needed for Nausea or Vomiting. - Oral    Linked Group 1:  \"Or\" Linked Group Details        sodium chloride 0.9 % flush 1-10 mL 1-10 mL As Needed 12/16/2017     Sig - Route: Infuse 1-10 mL into a venous catheter As Needed for Line Care. - Intravenous    sodium chloride 0.9 % flush 10 mL 10 mL As Needed 12/16/2017     Sig - Route: Infuse 10 mL into a venous catheter As Needed for Line Care. - Intravenous    Cosign for Ordering: Accepted by Juan Manuel Rojas MD on 12/16/2017 10:35 AM    enoxaparin (LOVENOX) syringe 40 mg (Discontinued) 40 mg Every 24 Hours 12/16/2017 12/16/2017    Sig - Route: Inject 0.4 mL under the skin Daily. - Subcutaneous    pantoprazole (PROTONIX) EC tablet 40 mg (Discontinued) 40 mg Every Morning 12/17/2017 12/16/2017    Sig - Route: Take 1 tablet by mouth Every Morning. - Oral          Blood Administration Record     None          Lab Results (last 24 hours)     Procedure Component Value Units Date/Time    POC Troponin, Rapid " [[id number]]  (Normal) Collected:  12/16/17 1050    Specimen:  Blood Updated:  12/16/17 1105     Troponin I 0.04 ng/mL       Serial Number: [id number]:  [id number]       Comprehensive Metabolic Panel [[id number]] Collected:  12/16/17 1045    Specimen:  Blood Updated:  12/16/17 1111     Glucose 98 mg/dL      BUN 22 mg/dL      Creatinine 1.00 mg/dL      Sodium 136 mmol/L      Potassium 4.5 mmol/L      Chloride 103 mmol/L      CO2 26.0 mmol/L      Calcium 9.0 mg/dL      Total Protein 6.5 g/dL      Albumin 4.10 g/dL      ALT (SGPT) 19 U/L      AST (SGOT) 28 U/L      Alkaline Phosphatase 82 U/L      Total Bilirubin 0.4 mg/dL      eGFR Non African Amer 75 mL/min/1.73      Globulin 2.4 gm/dL      A/G Ratio 1.7 g/dL      BUN/Creatinine Ratio 22.0     Anion Gap 7.0 mmol/L     Narrative:       [employer] Guidelines    Stage     Description        GFR  1         Normal or High     90+  2         Mild decrease      60-89  3         Moderate decrease  30-59  4         Severe decrease    15-29  5         Kidney failure     <15    BNP [[id number]]  (Abnormal) Collected:  12/16/17 1045    Specimen:  Blood Updated:  12/16/17 1117     .0 (H) pg/mL     CBC Auto Differential [[id number]]  (Abnormal) Collected:  12/16/17 1045    Specimen:  Blood Updated:  12/16/17 1128     WBC 7.64 10*3/mm3      RBC 2.88 (L) 10*6/mm3      Hemoglobin 5.2 (C) g/dL      Hematocrit 20.7 (L) %      MCV 71.9 (L) fL      MCH 18.1 (L) pg      MCHC 25.1 (L) g/dL      RDW 18.2 (H) %      RDW-SD 47.5 fl      MPV 9.9 fL      Platelets 404 10*3/mm3      Neutrophil % 71.6 (H) %      Lymphocyte % 14.9 (L) %      Monocyte % 9.7 %      Eosinophil % 3.0 %      Basophil % 0.4 %      Immature Grans % 0.4 %      Neutrophils, Absolute 5.47 10*3/mm3      Lymphocytes, Absolute 1.14 10*3/mm3      Monocytes, Absolute 0.74 10*3/mm3      Eosinophils, Absolute 0.23 10*3/mm3      Basophils, Absolute 0.03 10*3/mm3      Immature Grans, Absolute 0.03 10*3/mm3      Narrative:       Appended report.  These results have been appended to a previously verified report.    CBC & Differential [[id number]] Collected:  12/16/17 1045    Specimen:  Blood Updated:  12/16/17 1128    Narrative:       The following orders were created for panel order CBC & Differential.  Procedure                               Abnormality         Status                     ---------                               -----------         ------                     Scan Slide[[id number]]                                       Final result               CBC Auto Differential[[id number]]        Abnormal            Final result                 Please view results for these tests on the individual orders.    Scan Slide [[id number]] Collected:  12/16/17 1045    Specimen:  Blood Updated:  12/16/17 1128     Hypochromia Large/3+     Microcytes Mod/2+     WBC Morphology Normal     Platelet Morphology Normal    Light Blue Top [[id number]] Collected:  12/16/17 1045    Specimen:  Blood Updated:  12/16/17 1146     Extra Tube hold for add-on      Auto resulted       Green Top (Gel) [[id number]] Collected:  12/16/17 1045    Specimen:  Blood Updated:  12/16/17 1146     Extra Tube Hold for add-ons.      Auto resulted.       Lavender Top [[id number]] Collected:  12/16/17 1045    Specimen:  Blood Updated:  12/16/17 1146     Extra Tube hold for add-on      Auto resulted       Gold Top - SST [[id number]] Collected:  12/16/17 1045    Specimen:  Blood Updated:  12/16/17 1146     Extra Tube Hold for add-ons.      Auto resulted.       [place] Draw [[id number]] Collected:  12/16/17 1045    Specimen:  Blood Updated:  12/16/17 1309    Narrative:       The following orders were created for panel order [place] Draw.  Procedure                               Abnormality         Status                     ---------                               -----------         ------                     Light Blue Top[[id number]]                                   Final  result               Green Top (Gel)[[id number]]                                  Final result               Lavender Top[[id number]]                                     Final result               Gold Top - SST[[id number]]                                   Final result               Green Top (No Gel)[[id number]]                                                            Please view results for these tests on the individual orders.    Hgb. Frac. Profile [[id number]] Collected:  12/16/17 1306    Specimen:  Blood Updated:  12/16/17 1311    Vitamin B12 [[id number]]  (Normal) Collected:  12/16/17 1045    Specimen:  Blood Updated:  12/16/17 1330     Vitamin B-12 461 pg/mL     Folate [[id number]]  (Normal) Collected:  12/16/17 1045    Specimen:  Blood Updated:  12/16/17 1330     Folate 17.97 ng/mL     Narrative:         Folate Reference Ranges:    Deficient:            Less than 1.2 ng/mL  Indeterminant:        1.2-3.1 ng/mL  Normal:               3.2-20.0 ng/mL    Iron Profile [[id number]]  (Abnormal) Collected:  12/16/17 1045    Specimen:  Blood Updated:  12/16/17 1330     Iron 7 (L) mcg/dL      TIBC 495 (H) mcg/dL      Iron Saturation 1 (L) %     Ferritin [[id number]]  (Abnormal) Collected:  12/16/17 1045    Specimen:  Blood Updated:  12/16/17 1330     Ferritin 2.00 (L) ng/mL     POC Glucose Once [[id number]]  (Normal) Collected:  12/16/17 1331    Specimen:  Blood Updated:  12/16/17 1333     Glucose 119 mg/dL     Narrative:       Meter: [id number] : [id number] Laila Dedra    TSH [[id number]]  (Normal) Collected:  12/16/17 1306    Specimen:  Blood Updated:  12/16/17 1338     TSH 1.596 mIU/mL     Hemoglobin A1C With EMG [[id number]] Collected:  12/16/17 1306    Specimen:  Blood Updated:  12/16/17 1349    Magnesium [[id number]]  (Normal) Collected:  12/16/17 1306    Specimen:  Blood Updated:  12/16/17 1531     Magnesium 1.6 mg/dL     POC Glucose Once [[id number]]  (Abnormal) Collected:  12/16/17 1604    Specimen:  Blood Updated:   12/16/17 1605     Glucose 200 (H) mg/dL     Narrative:       Confirmed by Repeat Meter: [id number] : [id number] Rasheed Munoz        Imaging Results (last 24 hours)     Procedure Component Value Units Date/Time    XR Chest 2 View [[id number]] Collected:  12/16/17 1404     Updated:  12/16/17 1427    Narrative:          EXAMINATION: XR CHEST 2 VIEWS - 12/16/2017     INDICATION: Dyspnea, progressive peripheral edema.     COMPARISON: None.     FINDINGS: The heart is slightly large. There is mild central vascular  congestion. There is no consolidation, mass or effusion.           Impression:       Cardiomegaly with mild central vascular congestion.     DICTATED:     12/16/2017  EDITED:          12/16/2017           This report was finalized on 12/16/2017 2:24 PM by Dr. Ludwig Doyle MD.           ECG/EMG Results (last 24 hours)     Procedure Component Value Units Date/Time    ECG 12 Lead [[id number]] Collected:  12/16/17 1014     Updated:  12/16/17 1043    Narrative:       Test Reason : short of breath  Blood Pressure : **/** mmHG  Vent. Rate : 084 BPM     Atrial Rate : 084 BPM     P-R Int : 172 ms          QRS Dur : 086 ms      QT Int : 374 ms       P-R-T Axes : 029 037 045 degrees     QTc Int : 441 ms    Normal sinus rhythm  Normal ECG  No previous ECGs available  Confirmed by SELMA SAUL MD ([id number]) on 12/16/2017 10:42:55 AM    Referred By:  edmd           Confirmed By:SELMA SAUL MD          Orders (last 24 hrs)     Start     Ordered    12/17/17 0700  pantoprazole (PROTONIX) EC tablet 40 mg  Every Morning,   Status:  Discontinued      12/16/17 1432    12/17/17 0600  Basic Metabolic Panel  Morning Draw      12/16/17 1256    12/17/17 0600  CBC Auto Differential  Morning Draw      12/16/17 1256    12/17/17 0001  NPO Diet  Diet Effective Midnight      12/16/17 1256    12/16/17 2100  atorvastatin (LIPITOR) tablet 10 mg  Nightly      12/16/17 1432    12/16/17 1800  carvedilol (COREG) tablet 12.5 mg  2 Times Daily With  Meals      12/16/17 1432    12/16/17 1800  furosemide (LASIX) tablet 40 mg  2 Times Daily      12/16/17 1432    12/16/17 1800  magnesium oxide (MAGOX) tablet 400 mg  2 Times Daily      12/16/17 1432    12/16/17 1730  pantoprazole (PROTONIX) EC tablet 80 mg  2 Times Daily Before Meals      12/16/17 1546    12/16/17 1700  POC Glucose 4x Daily AC & at Bedtime  4 Times Daily Before Meals & at Bedtime      12/16/17 1256    12/16/17 1658  Inpatient Admission  Once      12/16/17 1657    12/16/17 1606  POC Glucose Once  Once      12/16/17 1605    12/16/17 1600  Vital Signs  Every 4 Hours     Comments:  Per per hospital policy    12/16/17 1256    12/16/17 1600  hydrALAZINE (APRESOLINE) tablet 50 mg  3 Times Daily      12/16/17 1432    12/16/17 1540  Adult Transthoracic Echo Complete W/ Cont if Necessary Per Protocol  Once     Comments:  Also severe BLE edema    12/16/17 1539    12/16/17 1515  lisinopril (PRINIVIL,ZESTRIL) tablet 40 mg  Daily      12/16/17 1432    12/16/17 1446  Magnesium  STAT      12/16/17 1445    12/16/17 1436  Place Sequential Compression Device  Once      12/16/17 1435    12/16/17 1430  HYDROcodone-acetaminophen (NORCO)  MG per tablet 1 tablet  Every 6 Hours PRN      12/16/17 1432    12/16/17 1430  cyclobenzaprine (FLEXERIL) tablet 10 mg  3 Times Daily PRN      12/16/17 1432    12/16/17 1400  enoxaparin (LOVENOX) syringe 40 mg  Every 24 Hours,   Status:  Discontinued      12/16/17 1256    12/16/17 1359  ABO RH Specimen Verification  Once      12/16/17 1358    12/16/17 1349  Hemoglobin A1C With EMG  Once      12/16/17 1349    12/16/17 1334  POC Glucose Once  Once      12/16/17 1333    12/16/17 1330  insulin lispro (humaLOG) injection 0-7 Units  4 Times Daily With Meals & Nightly      12/16/17 1256    12/16/17 1330  nicotine (NICODERM CQ) 21 MG/24HR patch 1 patch  Every 24 Hours      12/16/17 1256    12/16/17 1300  Strict Intake and Output  Every Hour      12/16/17 1256    12/16/17 1257  Weigh  patient  Once      12/16/17 1256    12/16/17 1257  Do NOT Hold Basal Insulin When Patient is NPO, Hold Bolus Dose if NPO  Continuous      12/16/17 1256    12/16/17 1257  Follow [facility] Hypoglycemia Protocol For Blood Glucose Less Than 70 mg/dL  Until Discontinued      12/16/17 1256    12/16/17 1257  Hypoglycemia Treatment - Alert Patient That is Not NPO and Can Safely Swallow  Until Discontinued     Comments:  Administer 4 oz Fruit Juice OR 4 oz Regular Soda OR 8 oz Milk OR 15-30 grams (1 tube) of Glucose Gel  Recheck Blood Glucose 15 Minutes After Ingestion, Repeat Treatment & Continue to Recheck Blood Sugar Every 15 Minutes Until Blood Glucose is 70 or Higher  Once Blood Glucose is 70 or Higher, Give Snack (Peanut Butter & Crackers) if Next Meal Will Be Given in More Than 60 Minutes, Provide Meal Tray As Soon As Possible    12/16/17 1256    12/16/17 1257  Hypoglycemia Treatment - Patient Has IV Access - Unresponsive, NPO or Unable To Safely Swallow  Until Discontinued     Comments:  Administer 25g D50W IV Push (1 Whole Vial)  Recheck Blood Glucose 15 Minutes After Administration, if Blood Glucose Remains Less Than 70, Repeat Treatment   Recheck Blood Glucose 15 Minutes After 2nd Administration, if Blood Glucose Remains Less Than 70 After 2nd Dose of D50W Contact Provider for Further Treatment Orders & Consider Adding IVF With D5 for Maintenance    12/16/17 1256    12/16/17 1257  Hypoglycemia Treatment - Patient Without IV Access - Unresponsive, NPO or Unable To Safely Swallow  Until Discontinued     Comments:  Administer 1mg Glucagon SQ & Establish IV Access  Turn Patient on Side - Nausea / Vomiting May Occur  Recheck Blood Glucose 15 Minutes After Administration  If IV Access Has Not Been Established & Blood Glucose Remains Less Than 70, Repeat Treatment With Glucagon  If IV Access Established, Administer 25g D50W IV Push (1 Whole Vial)  Recheck Blood Glucose 15 Minutes After Administration of 2nd Medication, if  Blood Glucose Remains Less Than 70, Contact Provider for Further Treatment Orders & Consider Adding IVF With D5 for Maintenance    12/16/17 1256    12/16/17 1257  Oxygen Therapy-  Continuous      12/16/17 1256    12/16/17 1257  Insert Peripheral IV  Once      12/16/17 1256    12/16/17 1257  Saline Lock & Maintain IV Access  Continuous      12/16/17 1256    12/16/17 1257  Full Code  Continuous      12/16/17 1256    12/16/17 1257  VTE Risk Assessment - Moderate Risk  Once      12/16/17 1256    12/16/17 1257  Place Compression Stockings (TEDs)  Once      12/16/17 1256    12/16/17 1257  Remove & Replace Compression Stockings (TEDS) Daily  Daily      12/16/17 1256    12/16/17 1257  Pulse Oximetry,  Spot  Once      12/16/17 1256    12/16/17 1257  Notify Physician (Specify Parameters)  Until Discontinued      12/16/17 1256    12/16/17 1257  Diet Regular; Cardiac, Consistent Carbohydrate  Diet Effective Now      12/16/17 1256    12/16/17 1257  Inpatient Consult to Gastroenterology  Once     Specialty:  Gastroenterology  Provider:  Mark I Brunner, MD    12/16/17 1256    12/16/17 1257  Hemoglobin A1c  STAT,   Status:  Canceled      12/16/17 1256    12/16/17 1257  TSH  STAT      12/16/17 1256    12/16/17 1257  Tobacco Cessation Education  Prior to Discharge      12/16/17 1256    12/16/17 1256  ipratropium-albuterol (DUO-NEB) nebulizer solution 3 mL  4 Times Daily PRN      12/16/17 1256    12/16/17 1256  dextrose (GLUTOSE) oral gel 15 g  Every 15 Minutes PRN      12/16/17 1256    12/16/17 1256  dextrose (D50W) solution 25 g  Every 15 Minutes PRN      12/16/17 1256    12/16/17 1256  glucagon (human recombinant) (GLUCAGEN DIAGNOSTIC) injection 1 mg  Every 15 Minutes PRN      12/16/17 1256    12/16/17 1256  sodium chloride 0.9 % flush 1-10 mL  As Needed      12/16/17 1256    12/16/17 1256  bisacodyl (DULCOLAX) EC tablet 5 mg  Daily PRN      12/16/17 1256    12/16/17 1256  bisacodyl (DULCOLAX) suppository 10 mg  Daily PRN       12/16/17 1256    12/16/17 1256  promethazine (PHENERGAN) tablet 12.5 mg  Every 6 Hours PRN      12/16/17 1256    12/16/17 1256  promethazine (PHENERGAN) injection 12.5 mg  Every 6 Hours PRN      12/16/17 1256    12/16/17 1256  promethazine (PHENERGAN) suppository 12.5 mg  Every 6 Hours PRN      12/16/17 1256    12/16/17 1256  calcium carbonate (TUMS) chewable tablet 500 mg (200 mg elemental)  2 Times Daily PRN      12/16/17 1256    12/16/17 1243  Initiate Observation Status  Once      12/16/17 1246    12/16/17 1231  Hgb. Frac. Profile  Once      12/16/17 1230    12/16/17 1223  Vitamin B12  STAT      12/16/17 1223    12/16/17 1223  Folate  STAT      12/16/17 1223    12/16/17 1223  Iron Profile  STAT      12/16/17 1223    12/16/17 1223  Ferritin  STAT      12/16/17 1223    12/16/17 1222  Med / Surg Bed Request  Once      12/16/17 1221    12/16/17 1114  Type & Screen  Once      12/16/17 1113    12/16/17 1114  Prepare RBC, 2 Units  Blood - Once      12/16/17 1113    12/16/17 1113  Verify Informed Consent for Blood Product Administration  Once      12/16/17 1113    12/16/17 1106  POC Troponin, Rapid  Once      12/16/17 1105    12/16/17 1058  Scan Slide  Once      12/16/17 1057    12/16/17 1037  XR Chest 2 View  1 Time Imaging      12/16/17 1036    12/16/17 1015  NPO Diet  Diet Effective Now,   Status:  Canceled      12/16/17 1014    12/16/17 1015  Undress and Gown  Once      12/16/17 1014    12/16/17 1015  Cardiac monitoring  Per Hospital Policy,   Status:  Canceled      12/16/17 1014    12/16/17 1015  Continuous Pulse Oximetry  Per Hospital Policy,   Status:  Canceled      12/16/17 1014    12/16/17 1015  Vital Signs  Per Hospital Policy      12/16/17 1014    12/16/17 1015  XR Chest 1 View  1 Time Imaging,   Status:  Canceled      12/16/17 1014    12/16/17 1015  Insert peripheral IV  Once      12/16/17 1014    12/16/17 1015  [place] Draw  Once      12/16/17 1014    12/16/17 1015  CBC & Differential  Once      12/16/17  1014    12/16/17 1015  Comprehensive Metabolic Panel  Once      12/16/17 1014    12/16/17 1015  BNP  Once      12/16/17 1014    12/16/17 1015  POCT Troponin, Rapid  Once      12/16/17 1014    12/16/17 1015  Light Blue Top  PROCEDURE ONCE      12/16/17 1014    12/16/17 1015  Green Top (Gel)  PROCEDURE ONCE      12/16/17 1014    12/16/17 1015  Lavender Top  PROCEDURE ONCE      12/16/17 1014    12/16/17 1015  Gold Top - SST  PROCEDURE ONCE      12/16/17 1014    12/16/17 1015  Green Top (No Gel)  PROCEDURE ONCE,   Status:  Canceled      12/16/17 1014    12/16/17 1015  CBC Auto Differential  PROCEDURE ONCE      12/16/17 1014    12/16/17 1014  sodium chloride 0.9 % flush 10 mL  As Needed      12/16/17 1014    12/16/17 1011  ECG 12 Lead  Once      12/16/17 1011    Unscheduled  Oxygen Therapy- Nasal Cannula; 2 LPM; Titrate for SPO2: equal to or greater than, 92%  As Needed      12/16/17 1014    Unscheduled  Transfuse RBC, 2 Units Infuse Each Unit Over: 4H  Transfusion      12/16/17 1113    Unscheduled  Occult Blood X 1, Stool - Stool, Per Rectum  As Needed      12/16/17 1432    --  Aspirin-Acetaminophen-Caffeine (GOODY HEADACHE PO)  Status:  Discontinued      12/16/17 1019    --  aspirin-acetaminophen-caffeine (EXCEDRIN MIGRAINE) 250-250-65 MG per tablet  Every 6 Hours PRN      12/16/17 1249    --  cyclobenzaprine (FLEXERIL) 10 MG tablet  3 Times Daily PRN      12/16/17 1249    --  metFORMIN (GLUCOPHAGE) 500 MG tablet  2 Times Daily With Meals      12/16/17 1249    --  carvedilol (COREG) 12.5 MG tablet  2 Times Daily With Meals      12/16/17 1249    --  albuterol (PROVENTIL HFA;VENTOLIN HFA) 108 (90 Base) MCG/ACT inhaler  Every 4 Hours PRN      12/16/17 1249    Pending  Diet Regular; Consistent Carbohydrate, Cardiac  Diet Effective Now     Comments:  Please send tray now    Pending          Physician Progress Notes (last 24 hours) (Notes from 12/15/2017  5:01 PM through 12/16/2017  5:01 PM)     No notes of this type exist  for this encounter.

## 2017-12-16 NOTE — PLAN OF CARE
Problem: Patient Care Overview (Adult)  Goal: Plan of Care Review    12/16/17 1301   Coping/Psychosocial Response Interventions   Plan Of Care Reviewed With patient   Patient Care Overview   Progress no change   Outcome Evaluation   Outcome Summary/Follow up Plan PLAN OF CARE

## 2017-12-16 NOTE — PLAN OF CARE
Problem: Tissue Perfusion, Ineffective Peripheral (Adult)  Goal: Identify Related Risk Factors and Signs and Symptoms    12/16/17 1514   Tissue Perfusion, Ineffective Peripheral   Tissue Perfusion, Ineffective Peripheral: Related Risk Factors anemia;diabetes mellitus   Signs and Symptoms (Ineffective Peripheral Tissue Perfusion) muscle weakness;skin color changes

## 2017-12-16 NOTE — CONSULTS
[facility]    Referring Provider: Konstantin Palm MD    PCP: Konstantin Palm MD    Reason for Consultation: Severe microcytic anemia.    Chief complaint: Dyspnea with exertion.    History of present illness:    Damion Llanos is a 62 y.o. male who is admitted with symptomatic anemia. He has longstanding anemia, with iron deficiency. Denies change in bowel habits or blood per rectum. No abdominal pain or nausea. In the past month he has worsening fatigue and dyspnea with exertion. Hgb found to be 5.2, MCV 72. Colonoscopy in 1/2017 showed 2 sessile serrated adenomas.    Allergies:  Review of patient's allergies indicates no known allergies.    Scheduled Meds:    atorvastatin 10 mg Oral Nightly   carvedilol 12.5 mg Oral BID With Meals   furosemide 40 mg Oral BID   hydrALAZINE 50 mg Oral TID   insulin lispro 0-7 Units Subcutaneous 4x Daily With Meals & Nightly   lisinopril 40 mg Oral Daily   magnesium oxide 400 mg Oral BID   nicotine 1 patch Transdermal Q24H   pantoprazole 80 mg Oral BID AC        Infusions:       PRN Meds:  bisacodyl  •  bisacodyl  •  calcium carbonate  •  cyclobenzaprine  •  dextrose  •  dextrose  •  glucagon (human recombinant)  •  HYDROcodone-acetaminophen  •  ipratropium-albuterol  •  promethazine **OR** promethazine **OR** promethazine  •  sodium chloride  •  sodium chloride    Home Meds:  Prescriptions Prior to Admission   Medication Sig Dispense Refill Last Dose   • albuterol (PROVENTIL HFA;VENTOLIN HFA) 108 (90 Base) MCG/ACT inhaler Inhale 2 puffs Every 4 (Four) Hours As Needed for Wheezing.      • aspirin 81 MG chewable tablet Chew 81 mg Daily.   12/16/2017   • aspirin-acetaminophen-caffeine (EXCEDRIN MIGRAINE) 250-250-65 MG per tablet Take 1 tablet by mouth Every 6 (Six) Hours As Needed for Headache.      • carvedilol (COREG) 12.5 MG tablet Take 12.5 mg by mouth 2 (Two) Times a Day With Meals.   12/16/2017   • cyclobenzaprine (FLEXERIL) 10 MG tablet Take 10 mg by mouth 3 (Three)  Times a Day As Needed for Muscle Spasms.      • furosemide (LASIX) 40 MG tablet Take 40 mg by mouth 2 (Two) Times a Day.   12/16/2017   • hydrALAZINE (APRESOLINE) 50 MG tablet Take 50 mg by mouth 3 (Three) Times a Day.   12/16/2017   • HYDROcodone-acetaminophen (NORCO)  MG per tablet Take 1 tablet by mouth Every 6 (Six) Hours As Needed for moderate pain (4-6).   12/16/2017   • lisinopril (PRINIVIL,ZESTRIL) 40 MG tablet Take 40 mg by mouth Daily.   12/16/2017   • magnesium oxide (MAGOX) 400 (241.3 MG) MG tablet tablet Take 400 mg by mouth 2 (Two) Times a Day.   12/16/2017   • metFORMIN (GLUCOPHAGE) 500 MG tablet Take 500 mg by mouth 2 (Two) Times a Day With Meals.   12/16/2017   • NIFEdipine XL (NIFEDICAL XL) 30 MG 24 hr tablet Take 30 mg by mouth Daily.   12/15/2017   • omeprazole (PriLOSEC) 20 MG capsule Take 20 mg by mouth Daily.   12/16/2017   • simvastatin (ZOCOR) 20 MG tablet Take 20 mg by mouth Every Night.   12/15/2017       ROS: Review of Systems   Constitutional: Positive for activity change and fatigue. Negative for appetite change, chills, diaphoresis, fever and unexpected weight change.   Eyes: Negative.    Respiratory: Positive for cough and chest tightness.         Chest tightness relieved with inhaler.   Cardiovascular: Positive for leg swelling. Negative for chest pain and palpitations.   Gastrointestinal: Negative for abdominal distention, abdominal pain, anal bleeding, blood in stool, constipation, diarrhea, nausea and vomiting.        Obese   Endocrine: Negative.    Genitourinary: Negative.    Musculoskeletal: Positive for arthralgias, back pain and myalgias.   Skin: Negative for color change and rash.   Neurological: Positive for weakness and headaches. Negative for tremors, seizures and syncope.   Hematological: Negative.    Psychiatric/Behavioral: Negative.  Negative for agitation and behavioral problems.       PAST MED HX:  Past Medical History:   Diagnosis Date   • Anemia    • Arthritis  "   • Diabetes mellitus    • GERD (gastroesophageal reflux disease)    • Hyperlipidemia    • Hypertension        PAST SURG HX:  Past Surgical History:   Procedure Laterality Date   • HERNIA REPAIR         FAM HX:  Family History   Problem Relation Age of Onset   • Family history unknown: Yes       SOC HX:  Social History     Social History   • Marital status:      Spouse name: N/A   • Number of children: N/A   • Years of education: N/A     Occupational History   • Not on file.     Social History Main Topics   • Smoking status: Current Every Day Smoker     Packs/day: 0.50     Types: Cigarettes   • Smokeless tobacco: Never Used   • Alcohol use 4.2 oz/week     7 Cans of beer per week   • Drug use: No   • Sexual activity: Defer     Other Topics Concern   • Not on file     Social History Narrative   • No narrative on file       PHYSICAL EXAM  /61 (BP Location: Left arm, Patient Position: Sitting)  Pulse 79  Temp 98 °F (36.7 °C)  Resp 22  Ht 172.7 cm (68\")  Wt 118 kg (260 lb)  SpO2 97%  BMI 39.53 kg/m2  Wt Readings from Last 3 Encounters:   12/16/17 118 kg (260 lb)   01/23/17 113 kg (249 lb 3.2 oz)   10/26/16 113 kg (250 lb)   ,body mass index is 39.53 kg/(m^2).  Physical Exam   Constitutional: He is oriented to person, place, and time. He appears well-developed and well-nourished. No distress.   HENT:   Head: Normocephalic.   Eyes: Conjunctivae are normal. No scleral icterus.   Neck: Normal range of motion.   Cardiovascular: Normal rate and regular rhythm.    Pulmonary/Chest: Effort normal and breath sounds normal. No respiratory distress. He has no wheezes.   Abdominal: Soft. Bowel sounds are normal. He exhibits no distension and no mass. There is no tenderness.   Musculoskeletal: Normal range of motion. He exhibits edema.   Neurological: He is alert and oriented to person, place, and time.   Skin: Skin is warm and dry. No rash noted.   Psychiatric: He has a normal mood and affect.   Nursing note and " vitals reviewed.        Results Review:   I reviewed the patient's new clinical results.    Lab Results   Component Value Date    WBC 7.64 12/16/2017    HGB 5.2 (C) 12/16/2017    HGB 8.2 (L) 10/26/2016    HCT 20.7 (L) 12/16/2017    MCV 71.9 (L) 12/16/2017     12/16/2017       No results found for: INR    Lab Results   Component Value Date    GLUCOSE 98 12/16/2017    BUN 22 12/16/2017    CREATININE 1.00 12/16/2017    EGFRIFNONA 75 12/16/2017    BCR 22.0 12/16/2017    CO2 26.0 12/16/2017    CALCIUM 9.0 12/16/2017    ALBUMIN 4.10 12/16/2017    AST 28 12/16/2017    ALT 19 12/16/2017         ASSESSMENTS/PLANS    Chronic blood loss anemia, symptomatic (dyspnea on exertion).    Discussion: Patient had colonoscopy 11 months ago, which did not show a source of blood loss. Suspect erosive gastritis or peptic ulcer (patient takes BC headache powders 3x/day). Differential includes Celiac disease, Alvaro lesions, AVM's.    >> EGD tomorrow, with small bowel biopsy.  >> Discontinue BC powders.    I discussed the patients findings and my recommendations with patient.    Mark I. Brunner, MD  12/16/17  5:32 PM

## 2017-12-16 NOTE — ED PROVIDER NOTES
"Subjective   HPI Comments: Mr. Damion Llanos is a 62 year old male who presents to the ED with c/o shortness of breath and progressive swelling. He states that his shortness of breath has been gradually worsening for 1 month. He denies any recent changes that could cause this exacerbation, he has been taking the same medications prescribed by his PCP and has been compliant. He notes that he has been under increased stress due to his wife being sick. He states that the shortness of breath is \"not normal\" and he becomes short of breath during activities that normally do not make him short of breath. He has experienced intermittent chest tightness for which he uses an inhaler which provides him relief. He is a smoker but reports cutting back from 3 packs a day to 1 pack a day. He reports 2 months ago his weight was 242 and yesterday when checked was 266. There are no other known complaints at this time.          Patient is a 62 y.o. male presenting with shortness of breath.   History provided by:  Patient  Shortness of Breath   Severity:  Moderate  Onset quality:  Gradual  Duration:  1 month  Timing:  Constant  Progression:  Worsening  Chronicity:  New  Relieved by:  Inhaler  Worsened by:  Nothing  Ineffective treatments:  None tried  Risk factors: tobacco use        Review of Systems   Respiratory: Positive for chest tightness and shortness of breath.    Cardiovascular: Positive for leg swelling.   All other systems reviewed and are negative.      Past Medical History:   Diagnosis Date   • Anemia    • Arthritis    • Diabetes mellitus    • GERD (gastroesophageal reflux disease)    • Hyperlipidemia    • Hypertension        No Known Allergies    Past Surgical History:   Procedure Laterality Date   • HERNIA REPAIR         Family History   Problem Relation Age of Onset   • Family history unknown: Yes       Social History     Social History   • Marital status:      Spouse name: N/A   • Number of children: N/A   • " Years of education: N/A     Social History Main Topics   • Smoking status: Current Every Day Smoker     Packs/day: 0.50     Types: Cigarettes   • Smokeless tobacco: Never Used   • Alcohol use 4.2 oz/week     7 Cans of beer per week   • Drug use: No   • Sexual activity: Defer     Other Topics Concern   • None     Social History Narrative   • None         Objective   Physical Exam   Constitutional: He is oriented to person, place, and time. He appears well-developed and well-nourished.   Morbidly obese, breathless-appearing male. Talkative.   HENT:   Head: Normocephalic and atraumatic.   Eyes: Conjunctivae are normal. No scleral icterus.   Normal color in eyes and eyelids.   Neck: Normal range of motion. Neck supple. No JVD (sitting at 45 degrees) present.   Cardiovascular: Normal rate, regular rhythm and normal heart sounds.    Pulmonary/Chest: Breath sounds normal. He has no wheezes. He has no rhonchi. He has no rales.   Moderately decreased breath sounds.   Abdominal: There is tenderness (very minimal tenderness mid abdomen to right abdomen).   Musculoskeletal: Normal range of motion. He exhibits edema (4+ bilateral).   Lymphadenopathy:     He has no cervical adenopathy.   Neurological: He is alert and oriented to person, place, and time.   Skin: Skin is warm and dry.   Psychiatric: He has a normal mood and affect. His behavior is normal.   Nursing note and vitals reviewed.      Procedures         ED Course  ED Course   Comment By Time   Dr. Rojas spoke again with the patient who now reports that his blood work-up 1 year ago showed anemia but there is no known cause of the anemia. There is not an internal bleed and he has not seen any bloody stool or black stool. Sheldon Black 12/16 1124   Dr. Rojas has discussed the case in detail with Dr. Sandra, hospitalist, who will admit the patient. Sheldon Black 12/16 1220     Recent Results (from the past 24 hour(s))   Comprehensive Metabolic Panel    Collection Time:  12/16/17 10:45 AM   Result Value Ref Range    Glucose 98 70 - 100 mg/dL    BUN 22 9 - 23 mg/dL    Creatinine 1.00 0.60 - 1.30 mg/dL    Sodium 136 132 - 146 mmol/L    Potassium 4.5 3.5 - 5.5 mmol/L    Chloride 103 99 - 109 mmol/L    CO2 26.0 20.0 - 31.0 mmol/L    Calcium 9.0 8.7 - 10.4 mg/dL    Total Protein 6.5 5.7 - 8.2 g/dL    Albumin 4.10 3.20 - 4.80 g/dL    ALT (SGPT) 19 7 - 40 U/L    AST (SGOT) 28 0 - 33 U/L    Alkaline Phosphatase 82 25 - 100 U/L    Total Bilirubin 0.4 0.3 - 1.2 mg/dL    eGFR Non African Amer 75 >60 mL/min/1.73    Globulin 2.4 gm/dL    A/G Ratio 1.7 1.5 - 2.5 g/dL    BUN/Creatinine Ratio 22.0 7.0 - 25.0    Anion Gap 7.0 3.0 - 11.0 mmol/L   BNP    Collection Time: 12/16/17 10:45 AM   Result Value Ref Range    .0 (H) 0.0 - 100.0 pg/mL   Light Blue Top    Collection Time: 12/16/17 10:45 AM   Result Value Ref Range    Extra Tube hold for add-on    Green Top (Gel)    Collection Time: 12/16/17 10:45 AM   Result Value Ref Range    Extra Tube Hold for add-ons.    Lavender Top    Collection Time: 12/16/17 10:45 AM   Result Value Ref Range    Extra Tube hold for add-on    Gold Top - SST    Collection Time: 12/16/17 10:45 AM   Result Value Ref Range    Extra Tube Hold for add-ons.    CBC Auto Differential    Collection Time: 12/16/17 10:45 AM   Result Value Ref Range    WBC 7.64 3.50 - 10.80 10*3/mm3    RBC 2.88 (L) 4.20 - 5.76 10*6/mm3    Hemoglobin 5.2 (C) 13.1 - 17.5 g/dL    Hematocrit 20.7 (L) 38.9 - 50.9 %    MCV 71.9 (L) 80.0 - 99.0 fL    MCH 18.1 (L) 27.0 - 31.0 pg    MCHC 25.1 (L) 32.0 - 36.0 g/dL    RDW 18.2 (H) 11.3 - 14.5 %    RDW-SD 47.5 37.0 - 54.0 fl    MPV 9.9 6.0 - 12.0 fL    Platelets 404 150 - 450 10*3/mm3    Neutrophil % 71.6 (H) 41.0 - 71.0 %    Lymphocyte % 14.9 (L) 24.0 - 44.0 %    Monocyte % 9.7 0.0 - 12.0 %    Eosinophil % 3.0 0.0 - 3.0 %    Basophil % 0.4 0.0 - 1.0 %    Immature Grans % 0.4 0.0 - 0.6 %    Neutrophils, Absolute 5.47 1.50 - 8.30 10*3/mm3    Lymphocytes,  Absolute 1.14 0.60 - 4.80 10*3/mm3    Monocytes, Absolute 0.74 0.00 - 1.00 10*3/mm3    Eosinophils, Absolute 0.23 0.00 - 0.30 10*3/mm3    Basophils, Absolute 0.03 0.00 - 0.20 10*3/mm3    Immature Grans, Absolute 0.03 0.00 - 0.03 10*3/mm3   Scan Slide    Collection Time: 12/16/17 10:45 AM   Result Value Ref Range    Hypochromia Large/3+ None Seen    Microcytes Mod/2+ None Seen    WBC Morphology Normal Normal    Platelet Morphology Normal Normal   Vitamin B12    Collection Time: 12/16/17 10:45 AM   Result Value Ref Range    Vitamin B-12 461 211 - 911 pg/mL   Folate    Collection Time: 12/16/17 10:45 AM   Result Value Ref Range    Folate 17.97 3.20 - 20.00 ng/mL   Iron Profile    Collection Time: 12/16/17 10:45 AM   Result Value Ref Range    Iron 7 (L) 50 - 175 mcg/dL    TIBC 495 (H) 250 - 450 mcg/dL    Iron Saturation 1 (L) 20 - 50 %   Ferritin    Collection Time: 12/16/17 10:45 AM   Result Value Ref Range    Ferritin 2.00 (L) 22.00 - 322.00 ng/mL   POC Troponin, Rapid    Collection Time: 12/16/17 10:50 AM   Result Value Ref Range    Troponin I 0.04 0.00 - 0.07 ng/mL   Type & Screen    Collection Time: 12/16/17 11:35 AM   Result Value Ref Range    ABO Type A     RH type Positive     Antibody Screen Negative    TSH    Collection Time: 12/16/17  1:06 PM   Result Value Ref Range    TSH 1.596 0.350 - 5.350 mIU/mL   ABO RH Specimen Verification    Collection Time: 12/16/17  1:06 PM   Result Value Ref Range    ABO Type A     RH type Positive    Magnesium    Collection Time: 12/16/17  1:06 PM   Result Value Ref Range    Magnesium 1.6 1.3 - 2.7 mg/dL   POC Glucose Once    Collection Time: 12/16/17  1:31 PM   Result Value Ref Range    Glucose 119 70 - 130 mg/dL   POC Glucose Once    Collection Time: 12/16/17  4:04 PM   Result Value Ref Range    Glucose 200 (H) 70 - 130 mg/dL   Prepare RBC, 2 Units    Collection Time: 12/16/17  5:16 PM   Result Value Ref Range    Product Code T4111L50     Unit Number [id number]     UNIT   ABO A     UNIT  RH POS     Dispense Status XM     Blood Type APOS     Blood Expiration Date 201712192359     Blood Type Barcode 6200     Product Code D8576E59     Unit Number [id number]     UNIT  ABO A     UNIT  RH POS     Dispense Status IS     Blood Type APOS     Blood Expiration Date 201712192359     Blood Type Barcode 6200    POC Glucose Once    Collection Time: 12/16/17  8:28 PM   Result Value Ref Range    Glucose 162 (H) 70 - 130 mg/dL     Note: In addition to lab results from this visit, the labs listed above may include labs taken at another facility or during a different encounter within the last 24 hours. Please correlate lab times with ED admission and discharge times for further clarification of the services performed during this visit.    XR Chest 2 View   Final Result   Cardiomegaly with mild central vascular congestion.       DICTATED:     12/16/2017   EDITED:          12/16/2017               This report was finalized on 12/16/2017 2:24 PM by Dr. Ludwig Doyle MD.            Vitals:    12/16/17 1930 12/16/17 2000 12/16/17 2041 12/16/17 2042   BP: 109/53 111/56 133/62 133/62   BP Location:       Patient Position:       Pulse: 77 75 76    Resp:   18    Temp: 97 °F (36.1 °C) 98.2 °F (36.8 °C) 98.5 °F (36.9 °C)    TempSrc: Oral Oral Oral    SpO2: 98% 98% 99%    Weight:       Height:         Medications   sodium chloride 0.9 % flush 10 mL (not administered)   dextrose (GLUTOSE) oral gel 15 g (not administered)   dextrose (D50W) solution 25 g (not administered)   glucagon (human recombinant) (GLUCAGEN DIAGNOSTIC) injection 1 mg (not administered)   sodium chloride 0.9 % flush 1-10 mL (not administered)   insulin lispro (humaLOG) injection 0-7 Units (2 Units Subcutaneous Given 12/16/17 2042)   bisacodyl (DULCOLAX) EC tablet 5 mg (not administered)   bisacodyl (DULCOLAX) suppository 10 mg (not administered)   promethazine (PHENERGAN) tablet 12.5 mg (not administered)     Or   promethazine (PHENERGAN)  injection 12.5 mg (not administered)     Or   promethazine (PHENERGAN) suppository 12.5 mg (not administered)   calcium carbonate (TUMS) chewable tablet 500 mg (200 mg elemental) (not administered)   nicotine (NICODERM CQ) 21 MG/24HR patch 1 patch (1 patch Transdermal Medication Applied 12/16/17 1449)   ipratropium-albuterol (DUO-NEB) nebulizer solution 3 mL (not administered)   carvedilol (COREG) tablet 12.5 mg (12.5 mg Oral Given 12/16/17 1739)   cyclobenzaprine (FLEXERIL) tablet 10 mg (not administered)   furosemide (LASIX) tablet 40 mg (40 mg Oral Given 12/16/17 1739)   hydrALAZINE (APRESOLINE) tablet 50 mg (50 mg Oral Given 12/16/17 2042)   HYDROcodone-acetaminophen (NORCO)  MG per tablet 1 tablet (1 tablet Oral Given 12/16/17 2043)   lisinopril (PRINIVIL,ZESTRIL) tablet 40 mg (40 mg Oral Given 12/16/17 1449)   magnesium oxide (MAGOX) tablet 400 mg (400 mg Oral Given 12/16/17 1753)   atorvastatin (LIPITOR) tablet 10 mg (10 mg Oral Given 12/16/17 2042)   pantoprazole (PROTONIX) EC tablet 80 mg (80 mg Oral Given 12/16/17 1753)     ECG/EMG Results (last 24 hours)     Procedure Component Value Units Date/Time    ECG 12 Lead [[id number]] Collected:  12/16/17 1014     Updated:  12/16/17 1043    Narrative:       Test Reason : short of breath  Blood Pressure : **/** mmHG  Vent. Rate : 084 BPM     Atrial Rate : 084 BPM     P-R Int : 172 ms          QRS Dur : 086 ms      QT Int : 374 ms       P-R-T Axes : 029 037 045 degrees     QTc Int : 441 ms    Normal sinus rhythm  Normal ECG  No previous ECGs available  Confirmed by SELMA SAUL MD ([id number]) on 12/16/2017 10:42:55 AM    Referred By:  edmd           Confirmed By:SELMA SAUL MD                        [facility]    Final diagnoses:   Symptomatic anemia   Anasarca       Documentation assistance provided by jessica Black.  Information recorded by the scribe was done at my direction and has been verified and validated by me.     Sheldon Black  12/16/17 2543        Sheldon Black  12/16/17 1220       Juan Manuel Rojas MD  12/16/17 7226

## 2017-12-17 ENCOUNTER — ANESTHESIA (OUTPATIENT)
Dept: GASTROENTEROLOGY | Facility: HOSPITAL | Age: 62
End: 2017-12-17

## 2017-12-17 ENCOUNTER — APPOINTMENT (OUTPATIENT)
Dept: CARDIOLOGY | Facility: HOSPITAL | Age: 62
End: 2017-12-17
Attending: FAMILY MEDICINE

## 2017-12-17 ENCOUNTER — ANESTHESIA EVENT (OUTPATIENT)
Dept: GASTROENTEROLOGY | Facility: HOSPITAL | Age: 62
End: 2017-12-17

## 2017-12-17 VITALS
SYSTOLIC BLOOD PRESSURE: 126 MMHG | WEIGHT: 260 LBS | OXYGEN SATURATION: 95 % | BODY MASS INDEX: 39.4 KG/M2 | DIASTOLIC BLOOD PRESSURE: 62 MMHG | HEART RATE: 94 BPM | HEIGHT: 68 IN | RESPIRATION RATE: 16 BRPM | TEMPERATURE: 98.6 F

## 2017-12-17 LAB
ANION GAP SERPL CALCULATED.3IONS-SCNC: 9 MMOL/L (ref 3–11)
BASOPHILS # BLD AUTO: 0.04 10*3/MM3 (ref 0–0.2)
BASOPHILS NFR BLD AUTO: 0.6 % (ref 0–1)
BH CV ECHO MEAS - AO ROOT AREA (BSA CORRECTED): 1.1
BH CV ECHO MEAS - AO ROOT AREA: 5.4 CM^2
BH CV ECHO MEAS - AO ROOT DIAM: 2.6 CM
BH CV ECHO MEAS - BSA(HAYCOCK): 2.4 M^2
BH CV ECHO MEAS - BSA: 2.3 M^2
BH CV ECHO MEAS - BZI_BMI: 39.5 KILOGRAMS/M^2
BH CV ECHO MEAS - BZI_METRIC_HEIGHT: 172.7 CM
BH CV ECHO MEAS - BZI_METRIC_WEIGHT: 117.9 KG
BH CV ECHO MEAS - EDV(CUBED): 207.8 ML
BH CV ECHO MEAS - EDV(TEICH): 174.7 ML
BH CV ECHO MEAS - EF(CUBED): 73.9 %
BH CV ECHO MEAS - EF(TEICH): 64.9 %
BH CV ECHO MEAS - ESV(CUBED): 54.2 ML
BH CV ECHO MEAS - ESV(TEICH): 61.4 ML
BH CV ECHO MEAS - FS: 36.1 %
BH CV ECHO MEAS - IVS/LVPW: 0.95
BH CV ECHO MEAS - IVSD: 1.2 CM
BH CV ECHO MEAS - LA DIMENSION: 4.4 CM
BH CV ECHO MEAS - LA/AO: 1.7
BH CV ECHO MEAS - LV MASS(C)D: 318.7 GRAMS
BH CV ECHO MEAS - LV MASS(C)DI: 139.5 GRAMS/M^2
BH CV ECHO MEAS - LVIDD: 5.9 CM
BH CV ECHO MEAS - LVIDS: 3.8 CM
BH CV ECHO MEAS - LVPWD: 1.3 CM
BH CV ECHO MEAS - MV A MAX VEL: 85.9 CM/SEC
BH CV ECHO MEAS - MV DEC SLOPE: 683.1 CM/SEC^2
BH CV ECHO MEAS - MV DEC TIME: 0.21 SEC
BH CV ECHO MEAS - MV E MAX VEL: 134.3 CM/SEC
BH CV ECHO MEAS - MV E/A: 1.6
BH CV ECHO MEAS - MV P1/2T MAX VEL: 146.6 CM/SEC
BH CV ECHO MEAS - MV P1/2T: 62.9 MSEC
BH CV ECHO MEAS - MVA P1/2T LCG: 1.5 CM^2
BH CV ECHO MEAS - MVA(P1/2T): 3.5 CM^2
BH CV ECHO MEAS - PA ACC SLOPE: 783.2 CM/SEC^2
BH CV ECHO MEAS - PA ACC TIME: 0.11 SEC
BH CV ECHO MEAS - PA PR(ACCEL): 29.1 MMHG
BH CV ECHO MEAS - RV MAX PG: 2.3 MMHG
BH CV ECHO MEAS - RV V1 MAX: 75.5 CM/SEC
BH CV ECHO MEAS - SI(CUBED): 67.2 ML/M^2
BH CV ECHO MEAS - SI(TEICH): 49.6 ML/M^2
BH CV ECHO MEAS - SV(CUBED): 153.5 ML
BH CV ECHO MEAS - SV(TEICH): 113.4 ML
BH CV ECHO MEAS - TAPSE (>1.6): 2.8 CM2
BH CV XLRA - RV BASE: 5.2 CM
BH CV XLRA - RV LENGTH: 8.1 CM
BH CV XLRA - RV MID: 4.3 CM
BH CV XLRA - TDI S': 17.7 CM/SEC
BUN BLD-MCNC: 13 MG/DL (ref 9–23)
BUN/CREAT SERPL: 16.3 (ref 7–25)
CALCIUM SPEC-SCNC: 8.5 MG/DL (ref 8.7–10.4)
CHLORIDE SERPL-SCNC: 102 MMOL/L (ref 99–109)
CO2 SERPL-SCNC: 29 MMOL/L (ref 20–31)
CREAT BLD-MCNC: 0.8 MG/DL (ref 0.6–1.3)
DEPRECATED RDW RBC AUTO: 51.8 FL (ref 37–54)
DEPRECATED RDW RBC AUTO: 51.9 FL (ref 37–54)
EOSINOPHIL # BLD AUTO: 0.25 10*3/MM3 (ref 0–0.3)
EOSINOPHIL NFR BLD AUTO: 3.9 % (ref 0–3)
ERYTHROCYTE [DISTWIDTH] IN BLOOD BY AUTOMATED COUNT: 18.9 % (ref 11.3–14.5)
ERYTHROCYTE [DISTWIDTH] IN BLOOD BY AUTOMATED COUNT: 19 % (ref 11.3–14.5)
GFR SERPL CREATININE-BSD FRML MDRD: 97 ML/MIN/1.73
GLUCOSE BLD-MCNC: 106 MG/DL (ref 70–100)
GLUCOSE BLDC GLUCOMTR-MCNC: 118 MG/DL (ref 70–130)
GLUCOSE BLDC GLUCOMTR-MCNC: 158 MG/DL (ref 70–130)
GLUCOSE BLDC GLUCOMTR-MCNC: 362 MG/DL (ref 70–130)
HCT VFR BLD AUTO: 22.2 % (ref 38.9–50.9)
HCT VFR BLD AUTO: 22.9 % (ref 38.9–50.9)
HCT VFR BLD AUTO: 23.8 % (ref 38.9–50.9)
HCT VFR BLD AUTO: 26.9 % (ref 38.9–50.9)
HGB BLD-MCNC: 6 G/DL (ref 13.1–17.5)
HGB BLD-MCNC: 6.2 G/DL (ref 13.1–17.5)
HGB BLD-MCNC: 6.3 G/DL (ref 13.1–17.5)
HGB BLD-MCNC: 7.5 G/DL (ref 13.1–17.5)
HYPOCHROMIA BLD QL: NORMAL
IMM GRANULOCYTES # BLD: 0.02 10*3/MM3 (ref 0–0.03)
IMM GRANULOCYTES NFR BLD: 0.3 % (ref 0–0.6)
LEFT ATRIUM VOLUME INDEX: 35.4 ML/M2
LEFT ATRIUM VOLUME: 81 CM3
LV EF 2D ECHO EST: 65 %
LYMPHOCYTES # BLD AUTO: 1.03 10*3/MM3 (ref 0.6–4.8)
LYMPHOCYTES NFR BLD AUTO: 16.2 % (ref 24–44)
MAXIMAL PREDICTED HEART RATE: 156 BPM
MCH RBC QN AUTO: 20 PG (ref 27–31)
MCH RBC QN AUTO: 20.2 PG (ref 27–31)
MCHC RBC AUTO-ENTMCNC: 27 G/DL (ref 32–36)
MCHC RBC AUTO-ENTMCNC: 27.1 G/DL (ref 32–36)
MCV RBC AUTO: 74 FL (ref 80–99)
MCV RBC AUTO: 74.6 FL (ref 80–99)
MICROCYTES BLD QL: NORMAL
MONOCYTES # BLD AUTO: 0.97 10*3/MM3 (ref 0–1)
MONOCYTES NFR BLD AUTO: 15.3 % (ref 0–12)
NEUTROPHILS # BLD AUTO: 4.03 10*3/MM3 (ref 1.5–8.3)
NEUTROPHILS NFR BLD AUTO: 63.7 % (ref 41–71)
PLAT MORPH BLD: NORMAL
PLATELET # BLD AUTO: 354 10*3/MM3 (ref 150–450)
PLATELET # BLD AUTO: 380 10*3/MM3 (ref 150–450)
PMV BLD AUTO: 8.8 FL (ref 6–12)
PMV BLD AUTO: 9 FL (ref 6–12)
POTASSIUM BLD-SCNC: 4.2 MMOL/L (ref 3.5–5.5)
RBC # BLD AUTO: 3 10*6/MM3 (ref 4.2–5.76)
RBC # BLD AUTO: 3.07 10*6/MM3 (ref 4.2–5.76)
SODIUM BLD-SCNC: 140 MMOL/L (ref 132–146)
STRESS TARGET HR: 133 BPM
WBC MORPH BLD: NORMAL
WBC NRBC COR # BLD: 5.89 10*3/MM3 (ref 3.5–10.8)
WBC NRBC COR # BLD: 6.34 10*3/MM3 (ref 3.5–10.8)

## 2017-12-17 PROCEDURE — 36430 TRANSFUSION BLD/BLD COMPNT: CPT

## 2017-12-17 PROCEDURE — 25010000002 PROPOFOL 10 MG/ML EMULSION: Performed by: ANESTHESIOLOGY

## 2017-12-17 PROCEDURE — 0DB68ZX EXCISION OF STOMACH, VIA NATURAL OR ARTIFICIAL OPENING ENDOSCOPIC, DIAGNOSTIC: ICD-10-PCS | Performed by: INTERNAL MEDICINE

## 2017-12-17 PROCEDURE — 80048 BASIC METABOLIC PNL TOTAL CA: CPT | Performed by: FAMILY MEDICINE

## 2017-12-17 PROCEDURE — 99239 HOSP IP/OBS DSCHRG MGMT >30: CPT | Performed by: INTERNAL MEDICINE

## 2017-12-17 PROCEDURE — 85027 COMPLETE CBC AUTOMATED: CPT | Performed by: INTERNAL MEDICINE

## 2017-12-17 PROCEDURE — P9016 RBC LEUKOCYTES REDUCED: HCPCS

## 2017-12-17 PROCEDURE — 93306 TTE W/DOPPLER COMPLETE: CPT | Performed by: INTERNAL MEDICINE

## 2017-12-17 PROCEDURE — 88305 TISSUE EXAM BY PATHOLOGIST: CPT | Performed by: INTERNAL MEDICINE

## 2017-12-17 PROCEDURE — 86900 BLOOD TYPING SEROLOGIC ABO: CPT

## 2017-12-17 PROCEDURE — 85025 COMPLETE CBC W/AUTO DIFF WBC: CPT | Performed by: FAMILY MEDICINE

## 2017-12-17 PROCEDURE — 93306 TTE W/DOPPLER COMPLETE: CPT

## 2017-12-17 PROCEDURE — 85014 HEMATOCRIT: CPT | Performed by: INTERNAL MEDICINE

## 2017-12-17 PROCEDURE — 82962 GLUCOSE BLOOD TEST: CPT

## 2017-12-17 PROCEDURE — 85007 BL SMEAR W/DIFF WBC COUNT: CPT | Performed by: FAMILY MEDICINE

## 2017-12-17 PROCEDURE — 85018 HEMOGLOBIN: CPT | Performed by: INTERNAL MEDICINE

## 2017-12-17 PROCEDURE — 0DB98ZX EXCISION OF DUODENUM, VIA NATURAL OR ARTIFICIAL OPENING ENDOSCOPIC, DIAGNOSTIC: ICD-10-PCS | Performed by: INTERNAL MEDICINE

## 2017-12-17 RX ORDER — FAMOTIDINE 10 MG/ML
20 INJECTION, SOLUTION INTRAVENOUS ONCE
Status: CANCELLED | OUTPATIENT
Start: 2017-12-17 | End: 2017-12-17

## 2017-12-17 RX ORDER — SODIUM CHLORIDE 0.9 % (FLUSH) 0.9 %
1-10 SYRINGE (ML) INJECTION AS NEEDED
Status: DISCONTINUED | OUTPATIENT
Start: 2017-12-17 | End: 2017-12-17 | Stop reason: HOSPADM

## 2017-12-17 RX ORDER — LIDOCAINE HYDROCHLORIDE 10 MG/ML
0.5 INJECTION, SOLUTION EPIDURAL; INFILTRATION; INTRACAUDAL; PERINEURAL ONCE AS NEEDED
Status: DISCONTINUED | OUTPATIENT
Start: 2017-12-17 | End: 2017-12-17 | Stop reason: HOSPADM

## 2017-12-17 RX ORDER — OMEPRAZOLE 20 MG/1
CAPSULE, DELAYED RELEASE ORAL
Qty: 60 CAPSULE | Refills: 0 | Status: SHIPPED | OUTPATIENT
Start: 2017-12-17 | End: 2020-04-21

## 2017-12-17 RX ORDER — PROPOFOL 10 MG/ML
VIAL (ML) INTRAVENOUS AS NEEDED
Status: DISCONTINUED | OUTPATIENT
Start: 2017-12-17 | End: 2017-12-17 | Stop reason: SURG

## 2017-12-17 RX ORDER — PROPOFOL 10 MG/ML
VIAL (ML) INTRAVENOUS CONTINUOUS PRN
Status: DISCONTINUED | OUTPATIENT
Start: 2017-12-17 | End: 2017-12-17 | Stop reason: SURG

## 2017-12-17 RX ORDER — SODIUM CHLORIDE, SODIUM LACTATE, POTASSIUM CHLORIDE, CALCIUM CHLORIDE 600; 310; 30; 20 MG/100ML; MG/100ML; MG/100ML; MG/100ML
9 INJECTION, SOLUTION INTRAVENOUS CONTINUOUS
Status: DISCONTINUED | OUTPATIENT
Start: 2017-12-17 | End: 2017-12-17 | Stop reason: HOSPADM

## 2017-12-17 RX ORDER — FENTANYL CITRATE 50 UG/ML
50 INJECTION, SOLUTION INTRAMUSCULAR; INTRAVENOUS
Status: DISCONTINUED | OUTPATIENT
Start: 2017-12-17 | End: 2017-12-17 | Stop reason: HOSPADM

## 2017-12-17 RX ORDER — HYDROMORPHONE HYDROCHLORIDE 1 MG/ML
0.5 INJECTION, SOLUTION INTRAMUSCULAR; INTRAVENOUS; SUBCUTANEOUS
Status: DISCONTINUED | OUTPATIENT
Start: 2017-12-17 | End: 2017-12-17 | Stop reason: HOSPADM

## 2017-12-17 RX ORDER — FAMOTIDINE 20 MG/1
20 TABLET, FILM COATED ORAL ONCE
Status: CANCELLED | OUTPATIENT
Start: 2017-12-17 | End: 2017-12-17

## 2017-12-17 RX ORDER — IRON POLYSACCH/IRON HEME POLYP 28 MG
1 TABLET ORAL DAILY
Qty: 30 TABLET | Refills: 0 | Status: SHIPPED | OUTPATIENT
Start: 2017-12-17 | End: 2020-04-21

## 2017-12-17 RX ORDER — LIDOCAINE HYDROCHLORIDE 10 MG/ML
INJECTION, SOLUTION INFILTRATION; PERINEURAL AS NEEDED
Status: DISCONTINUED | OUTPATIENT
Start: 2017-12-17 | End: 2017-12-17 | Stop reason: SURG

## 2017-12-17 RX ADMIN — PROPOFOL 50 MCG/KG/MIN: 10 INJECTION, EMULSION INTRAVENOUS at 10:44

## 2017-12-17 RX ADMIN — LIDOCAINE HYDROCHLORIDE 25 MG: 10 INJECTION, SOLUTION INFILTRATION; PERINEURAL at 10:43

## 2017-12-17 RX ADMIN — FUROSEMIDE 40 MG: 40 TABLET ORAL at 17:27

## 2017-12-17 RX ADMIN — SODIUM CHLORIDE, POTASSIUM CHLORIDE, SODIUM LACTATE AND CALCIUM CHLORIDE 9 ML/HR: 600; 310; 30; 20 INJECTION, SOLUTION INTRAVENOUS at 10:16

## 2017-12-17 RX ADMIN — FUROSEMIDE 40 MG: 40 TABLET ORAL at 09:19

## 2017-12-17 RX ADMIN — Medication 400 MG: at 09:19

## 2017-12-17 RX ADMIN — INSULIN LISPRO 2 UNITS: 100 INJECTION, SOLUTION INTRAVENOUS; SUBCUTANEOUS at 17:27

## 2017-12-17 RX ADMIN — HYDROCODONE BITARTRATE AND ACETAMINOPHEN 1 TABLET: 10; 325 TABLET ORAL at 04:19

## 2017-12-17 RX ADMIN — NICOTINE 1 PATCH: 21 PATCH, EXTENDED RELEASE TRANSDERMAL at 14:43

## 2017-12-17 RX ADMIN — PROPOFOL 75 MG: 10 INJECTION, EMULSION INTRAVENOUS at 10:44

## 2017-12-17 RX ADMIN — Medication 400 MG: at 17:27

## 2017-12-17 RX ADMIN — PANTOPRAZOLE SODIUM 80 MG: 40 TABLET, DELAYED RELEASE ORAL at 09:18

## 2017-12-17 RX ADMIN — PANTOPRAZOLE SODIUM 80 MG: 40 TABLET, DELAYED RELEASE ORAL at 17:26

## 2017-12-17 RX ADMIN — CARVEDILOL 12.5 MG: 12.5 TABLET, FILM COATED ORAL at 17:27

## 2017-12-17 RX ADMIN — CYCLOBENZAPRINE HYDROCHLORIDE 10 MG: 10 TABLET, FILM COATED ORAL at 14:49

## 2017-12-17 RX ADMIN — HYDRALAZINE HYDROCHLORIDE 50 MG: 50 TABLET, FILM COATED ORAL at 16:06

## 2017-12-17 NOTE — PLAN OF CARE
Problem: Tissue Perfusion, Ineffective Peripheral (Adult)  Goal: Identify Related Risk Factors and Signs and Symptoms  Outcome: Ongoing (interventions implemented as appropriate)    12/17/17 0542   Tissue Perfusion, Ineffective Peripheral   Tissue Perfusion, Ineffective Peripheral: Related Risk Factors anemia;disease process;medication side effects   Signs and Symptoms (Ineffective Peripheral Tissue Perfusion) edema;muscle weakness       Goal: Adequate Tissue Perfusion  Outcome: Ongoing (interventions implemented as appropriate)    12/17/17 0542   Tissue Perfusion, Ineffective Peripheral (Adult)   Adequate Tissue Perfusion making progress toward outcome

## 2017-12-17 NOTE — ANESTHESIA PREPROCEDURE EVALUATION
Anesthesia Evaluation     Patient summary reviewed and Nursing notes reviewed   NPO Solid Status: > 8 hours  NPO Liquid Status: > 8 hours     Airway   Mallampati: I  TM distance: >3 FB  Neck ROM: full  no difficulty expected  Dental    (+) edentulous    Pulmonary    (+) a smoker Current, COPD (albuterol), shortness of breath,   Cardiovascular     ECG reviewed    (+) hypertension, hyperlipidemia    ROS comment: EKG NSR     Neuro/Psych  GI/Hepatic/Renal/Endo    (+) obesity,  GERD, renal disease, diabetes mellitus type 2 using insulin,   (-) morbid obesity    Musculoskeletal     Abdominal    Substance History      OB/GYN          Other   (+) arthritis         Phys Exam Other: Full Beard                             Anesthesia Plan    ASA 3 - emergent     MAC   (Propofol)  intravenous induction   Anesthetic plan and risks discussed with patient.

## 2017-12-17 NOTE — DISCHARGE SUMMARY
[facility] Medicine Services  DISCHARGE SUMMARY    Patient Name: Damion Llanos  : 1955  MRN: [id number]    Date of Admission: 2017  Date of Discharge:  2017  Primary Care Physician: Konstantin Palm MD    Consults     Date and Time Order Name Status Description    2017 1256 Inpatient Consult to Gastroenterology Completed         Hospital Course     Presenting Problem:   Symptomatic anemia [D64.9]  Symptomatic anemia [D64.9]    Active Hospital Problems (** Indicates Principal Problem)    Diagnosis Date Noted   • **Chronic blood loss anemia [D50.0] 2017   • severe iron deficiency anemia [D64.9] 2017   • Dependent edema [R60.9] 2017   • Chronic obstructive pulmonary disease with (acute) exacerbation [J44.1] 2017   • Essential hypertension [I10] 2017   • Tobacco abuse [Z72.0] 2017   • Type 2 diabetes mellitus with complication, without long-term current use of insulin [E11.8] 2017      Resolved Hospital Problems    Diagnosis Date Noted Date Resolved   No resolved problems to display.          Hospital Course:  Damion Llanos is a 62 y.o. male who presented from home with weakness and SOA due to symptomatic anemia.    Symptomatic anemia due to chronic blood loss anemia caused by erosive gastritis: 62 year old male admitted from home with SOA and weakness due to symptomatic anemia. Upon arrival patient hemoglobin was noted to be 5.2. He was transfused 2 units prbcs and did not respond as expected so he did receive a third unit of red cells which he did respond appropriately to and this did improve his symptoms. He underwent EGD by GI which showed erosive gastris most likely due to TID NSAID use. He was directed to stop all NSAIDs indefinitely and to take BID PPI x 1 month and then daily indefinitely. He take daily iron supplementation x 6-9 months until his counts have recovered. He will follow up with his PCP in 1-2 weeks with  CBC at that time.    Procedure(s):  ESOPHAGOGASTRODUODENOSCOPY   12/17 1038 UPPER GI ENDOSCOPY   Day of Discharge     HPI: Up in bed. Doing some better. No complaints.     Review of Systems  Gen- No fevers, chills  CV- No chest pain, palpitations  Resp- No cough, dyspnea  GI- No N/V/D, abd pain    Otherwise ROS is negative except as mentioned in the HPI.    Vital Signs:   Temp:  [97 °F (36.1 °C)-99.1 °F (37.3 °C)] 99.1 °F (37.3 °C)  Heart Rate:  [75-88] 86  Resp:  [16-22] 18  BP: (109-145)/(53-87) 143/64     Physical Exam:  Constitutional: No acute distress, awake, alert, chronically ill appearing  HENT: NCAT, mucous membranes moist  Respiratory: Normal WOB, scant bibasilar wheezes  Cardiovascular: RRR, no murmurs, rubs, or gallops, palpable pedal pulses bilaterally  Gastrointestinal: Positive bowel sounds, soft, nontender, nondistended  Musculoskeletal: No bilateral ankle edema  Psychiatric: Appropriate affect, cooperative  Neurologic: Oriented x 3, strength symmetric in all extremities, Cranial Nerves grossly intact to confrontation, speech clear  Skin: No rashes    Pertinent  and/or Most Recent Results       Results from last 7 days  Lab Units 12/17/17  0945 12/17/17  0617 12/16/17  1045   WBC 10*3/mm3  --  6.34 7.64   HEMOGLOBIN g/dL 6.3* 6.0* 5.2*   HEMATOCRIT % 23.8* 22.2* 20.7*   PLATELETS 10*3/mm3  --  354 404   SODIUM mmol/L  --  140 136   POTASSIUM mmol/L  --  4.2 4.5   CHLORIDE mmol/L  --  102 103   CO2 mmol/L  --  29.0 26.0   BUN mg/dL  --  13 22   CREATININE mg/dL  --  0.80 1.00   GLUCOSE mg/dL  --  106* 98   CALCIUM mg/dL  --  8.5* 9.0       Results from last 7 days  Lab Units 12/16/17  1045   BILIRUBIN mg/dL 0.4   ALK PHOS U/L 82   ALT (SGPT) U/L 19   AST (SGOT) U/L 28           Invalid input(s): TG, LDLREALC    Results from last 7 days  Lab Units 12/16/17  1306 12/16/17  1045   TSH mIU/mL 1.596  --    BNP pg/mL  --  131.0*     Brief Urine Lab Results     None          Microbiology Results Abnormal      None          Imaging Results (all)     Procedure Component Value Units Date/Time    XR Chest 2 View [[id number]] Collected:  12/16/17 1404     Updated:  12/16/17 1427    Narrative:          EXAMINATION: XR CHEST 2 VIEWS - 12/16/2017     INDICATION: Dyspnea, progressive peripheral edema.     COMPARISON: None.     FINDINGS: The heart is slightly large. There is mild central vascular  congestion. There is no consolidation, mass or effusion.           Impression:       Cardiomegaly with mild central vascular congestion.     DICTATED:     12/16/2017  EDITED:          12/16/2017           This report was finalized on 12/16/2017 2:24 PM by Dr. Ludwig Doyle MD.                   Order Current Status    Tissue Pathology Exam - Tissue, Small Intestine Collected (12/17/17 1052)    Hemoglobin A1C With EMG In process    Hgb. Frac. Profile In process        Discharge Details      Damion Llanos   Home Medication Instructions RAMESH:[id number]    Printed on:12/17/17 1231   Medication Information                      albuterol (PROVENTIL HFA;VENTOLIN HFA) 108 (90 Base) MCG/ACT inhaler  Inhale 2 puffs Every 4 (Four) Hours As Needed for Wheezing.             aspirin 81 MG chewable tablet  Chew 81 mg Daily.             carvedilol (COREG) 12.5 MG tablet  Take 12.5 mg by mouth 2 (Two) Times a Day With Meals.             cyclobenzaprine (FLEXERIL) 10 MG tablet  Take 10 mg by mouth 3 (Three) Times a Day As Needed for Muscle Spasms.             furosemide (LASIX) 40 MG tablet  Take 40 mg by mouth 2 (Two) Times a Day.             hydrALAZINE (APRESOLINE) 50 MG tablet  Take 50 mg by mouth 3 (Three) Times a Day.             HYDROcodone-acetaminophen (NORCO)  MG per tablet  Take 1 tablet by mouth Every 6 (Six) Hours As Needed for moderate pain (4-6).             lisinopril (PRINIVIL,ZESTRIL) 40 MG tablet  Take 40 mg by mouth Daily.             magnesium oxide (MAGOX) 400 (241.3 MG) MG tablet tablet  Take 400 mg by mouth 2 (Two)  Times a Day.             metFORMIN (GLUCOPHAGE) 500 MG tablet  Take 500 mg by mouth 2 (Two) Times a Day With Meals.             NIFEdipine XL (NIFEDICAL XL) 30 MG 24 hr tablet  Take 30 mg by mouth Daily.             omeprazole (priLOSEC) 20 MG capsule  Take 20 mg PO BID for 1 month then resume daily dosing.             Polysacch Fe Cmp-Fe Heme Poly (FEOSOL BIFERA) 28 MG tablet  Take 1 tablet by mouth Daily.             simvastatin (ZOCOR) 20 MG tablet  Take 20 mg by mouth Every Night.                   Discharge Disposition:  Home or Self Care    Discharge Diet: CC2, cardiac      Discharge Activity: As tolerated      Special Instructions:  Stop NSAIDs indefinitely. Take BID omeprazole for 1 month then resume prior daily dosing. Take iron supplementation daily. See your PCP in 1-2 weeks.    No future appointments.    Additional Instructions for the Follow-ups that You Need to Schedule     Discharge Follow-up with PCP    As directed    Follow Up Details:  1 week hospital follow up           CBC (No Diff)    Dec 22, 2017 (Approximate)                  Time Spent on Discharge:  45 minutes    Shelley Mojica II,   12/17/17  12:31 PM

## 2017-12-17 NOTE — ANESTHESIA POSTPROCEDURE EVALUATION
Patient: Damion Llanos    Procedure Summary     Date Anesthesia Start Anesthesia Stop Room / Location    12/17/17 1043   [facility] /  [facility]       Procedure Diagnosis Surgeon Provider    ESOPHAGOGASTRODUODENOSCOPY (N/A Esophagus) Chronic blood loss anemia  (Chronic blood loss anemia [D50.0]) Mark I Brunner, MD Paul Walmsley, MD          Anesthesia Type: MAC  Last vitals  BP   145/67 (12/17/17 1011)   124/60    1100   Temp   98.6 °F (37 °C) (12/17/17 1011)  97.4 1100   Pulse   84 (12/17/17 1011)   86% 1100   Resp   18 (12/17/17 1011)  16   1100   SpO2   97 % (12/17/17 1011)   95%  1100     Post Anesthesia Care and Evaluation    Patient location during evaluation: PACU  Patient participation: complete - patient participated  Level of consciousness: awake and alert  Pain score: 0  Pain management: adequate  Airway patency: patent  Anesthetic complications: No anesthetic complications  PONV Status: none  Cardiovascular status: hemodynamically stable and acceptable  Respiratory status: nonlabored ventilation, acceptable and nasal cannula  Hydration status: acceptable

## 2017-12-17 NOTE — BRIEF OP NOTE
ESOPHAGOGASTRODUODENOSCOPY  Progress Note    Damion Llanos  12/16/2017 - 12/17/2017    EGD shows erosive gastritis. No blood seen.    >> Discontinue BC powders  >> Needs daily iron supplement for 6-9 months. Suggest Feosol Complete with Bifera.    Patient stable for discharge from GI standpoint.    Mark I. Brunner, MD     Date: 12/17/2017  Time: 10:55 AM

## 2017-12-17 NOTE — PLAN OF CARE
Problem: Patient Care Overview (Adult)  Goal: Plan of Care Review  Outcome: Ongoing (interventions implemented as appropriate)    12/17/17 4595   Coping/Psychosocial Response Interventions   Plan Of Care Reviewed With patient   Outcome Evaluation   Outcome Summary/Follow up Plan if hgb is greater than 7 patient will be discharged home this evening, receiving one unit of blood today

## 2017-12-18 LAB
ABO + RH BLD: NORMAL
BH BB BLOOD EXPIRATION DATE: NORMAL
BH BB BLOOD TYPE BARCODE: 6200
BH BB DISPENSE STATUS: NORMAL
BH BB PRODUCT CODE: NORMAL
BH BB UNIT NUMBER: NORMAL
HGB A MFR BLD: 98.7 % (ref 94–98)
HGB A2 MFR BLD COLUMN CHROM: 1.3 % (ref 0.7–3.1)
HGB C MFR BLD: 0 %
HGB F MFR BLD: 0 % (ref 0–2)
HGB FRACT BLD-IMP: ABNORMAL
HGB S BLD QL SOLY: NEGATIVE
HGB S MFR BLD: 0 %
UNIT  ABO: NORMAL
UNIT  RH: NORMAL

## 2017-12-20 LAB
EST. AVERAGE GLUCOSE BLD GHB EST-MCNC: <74 MG/DL
HBA1C MFR BLD: <4.2 % (ref 4.8–5.6)

## 2017-12-20 NOTE — PAYOR COMM NOTE
"Abril Gutierrez RN Utilization Review [phone]  Fax# [phone]    Initial clinicals and notification of admission faxed on 17.  Authorization still pending review. Please call or fax with auth.   Chris Llanos (62 y.o. Male)     Date of Birth Social Security Number Address Home Phone MRN    1955  [address] [id number] [phone] [id number]    Hindu Marital Status          None        Admission Date Admission Type Admitting Provider Attending Provider Department, Room/Bed    17 Emergency YuniorShelley II, DO  [facility] 2F, S203/1    Discharge Date Discharge Disposition Discharge Destination        2017 Home or Self Care             Attending Provider: (none)    Allergies:  No Known Allergies    Isolation:  None   Infection:  None   Code Status:  Prior    Ht:  172.7 cm (68\")   Wt:  118 kg (260 lb)    Admission Cmt:  None   Principal Problem:  Chronic blood loss anemia [D50.0] More...                 Active Insurance as of 2017     Primary Coverage     Payor Plan Insurance Group Employer/Plan Group    [employer] [place] [employer]      Payor Plan Address Payor Plan Phone Number Effective From Effective To    [address] [address] [phone] 2016     [place] [address]       Subscriber Name Subscriber Birth Date Member ID       CHRIS LLANOS 1955 [id number]                 Emergency Contacts      (Rel.) Home Phone Work Phone Mobile Phone    Charisse Llanos (Spouse) [phone] -- --               Discharge Summary      Shelley Mojica II, DO at 2017 12:31 PM              [facility] Medicine Services  DISCHARGE SUMMARY    Patient Name: Chris Llanos  : 1955  MRN: [id number]    Date of Admission: 2017  Date of Discharge:  2017  Primary Care Physician: Konstantin Palm MD    Consults     Date and Time Order Name Status Description    2017 1256 Inpatient " Consult to Gastroenterology Completed         Hospital Course     Presenting Problem:   Symptomatic anemia [D64.9]  Symptomatic anemia [D64.9]    Active Hospital Problems (** Indicates Principal Problem)    Diagnosis Date Noted   • **Chronic blood loss anemia [D50.0] 12/16/2017   • severe iron deficiency anemia [D64.9] 12/16/2017   • Dependent edema [R60.9] 12/16/2017   • Chronic obstructive pulmonary disease with (acute) exacerbation [J44.1] 01/23/2017   • Essential hypertension [I10] 01/23/2017   • Tobacco abuse [Z72.0] 01/23/2017   • Type 2 diabetes mellitus with complication, without long-term current use of insulin [E11.8] 01/23/2017      Resolved Hospital Problems    Diagnosis Date Noted Date Resolved   No resolved problems to display.          Hospital Course:  Damion Llanos is a 62 y.o. male who presented from home with weakness and SOA due to symptomatic anemia.    Symptomatic anemia due to chronic blood loss anemia caused by erosive gastritis: 62 year old male admitted from home with SOA and weakness due to symptomatic anemia. Upon arrival patient hemoglobin was noted to be 5.2. He was transfused 2 units prbcs and did not respond as expected so he did receive a third unit of red cells which he did respond appropriately to and this did improve his symptoms. He underwent EGD by GI which showed erosive gastris most likely due to TID NSAID use. He was directed to stop all NSAIDs indefinitely and to take BID PPI x 1 month and then daily indefinitely. He take daily iron supplementation x 6-9 months until his counts have recovered. He will follow up with his PCP in 1-2 weeks with CBC at that time.    Procedure(s):  ESOPHAGOGASTRODUODENOSCOPY   12/17 [facility] UPPER GI ENDOSCOPY   Day of Discharge     HPI: Up in bed. Doing some better. No complaints.     Review of Systems  Gen- No fevers, chills  CV- No chest pain, palpitations  Resp- No cough, dyspnea  GI- No N/V/D, abd pain    Otherwise ROS is negative except as  mentioned in the HPI.    Vital Signs:   Temp:  [97 °F (36.1 °C)-99.1 °F (37.3 °C)] 99.1 °F (37.3 °C)  Heart Rate:  [75-88] 86  Resp:  [16-22] 18  BP: (109-145)/(53-87) 143/64     Physical Exam:  Constitutional: No acute distress, awake, alert, chronically ill appearing  HENT: NCAT, mucous membranes moist  Respiratory: Normal WOB, scant bibasilar wheezes  Cardiovascular: RRR, no murmurs, rubs, or gallops, palpable pedal pulses bilaterally  Gastrointestinal: Positive bowel sounds, soft, nontender, nondistended  Musculoskeletal: No bilateral ankle edema  Psychiatric: Appropriate affect, cooperative  Neurologic: Oriented x 3, strength symmetric in all extremities, Cranial Nerves grossly intact to confrontation, speech clear  Skin: No rashes    Pertinent  and/or Most Recent Results       Results from last 7 days  Lab Units 12/17/17  0945 12/17/17  0617 12/16/17  1045   WBC 10*3/mm3  --  6.34 7.64   HEMOGLOBIN g/dL 6.3* 6.0* 5.2*   HEMATOCRIT % 23.8* 22.2* 20.7*   PLATELETS 10*3/mm3  --  354 404   SODIUM mmol/L  --  140 136   POTASSIUM mmol/L  --  4.2 4.5   CHLORIDE mmol/L  --  102 103   CO2 mmol/L  --  29.0 26.0   BUN mg/dL  --  13 22   CREATININE mg/dL  --  0.80 1.00   GLUCOSE mg/dL  --  106* 98   CALCIUM mg/dL  --  8.5* 9.0       Results from last 7 days  Lab Units 12/16/17  1045   BILIRUBIN mg/dL 0.4   ALK PHOS U/L 82   ALT (SGPT) U/L 19   AST (SGOT) U/L 28           Invalid input(s): TG, LDLREALC    Results from last 7 days  Lab Units 12/16/17  1306 12/16/17  1045   TSH mIU/mL 1.596  --    BNP pg/mL  --  131.0*     Brief Urine Lab Results     None          Microbiology Results Abnormal     None          Imaging Results (all)     Procedure Component Value Units Date/Time    XR Chest 2 View [[id number]] Collected:  12/16/17 1404     Updated:  12/16/17 1427    Narrative:          EXAMINATION: XR CHEST 2 VIEWS - 12/16/2017     INDICATION: Dyspnea, progressive peripheral edema.     COMPARISON: None.     FINDINGS: The heart  is slightly large. There is mild central vascular  congestion. There is no consolidation, mass or effusion.           Impression:       Cardiomegaly with mild central vascular congestion.     DICTATED:     12/16/2017  EDITED:          12/16/2017           This report was finalized on 12/16/2017 2:24 PM by Dr. Ludwig Doyle MD.                   Order Current Status    Tissue Pathology Exam - Tissue, Small Intestine Collected (12/17/17 1052)    Hemoglobin A1C With EMG In process    Hgb. Frac. Profile In process        Discharge Details      Damion Llanos   Home Medication Instructions RAMESH:[id number]    Printed on:12/17/17 1231   Medication Information                      albuterol (PROVENTIL HFA;VENTOLIN HFA) 108 (90 Base) MCG/ACT inhaler  Inhale 2 puffs Every 4 (Four) Hours As Needed for Wheezing.             aspirin 81 MG chewable tablet  Chew 81 mg Daily.             carvedilol (COREG) 12.5 MG tablet  Take 12.5 mg by mouth 2 (Two) Times a Day With Meals.             cyclobenzaprine (FLEXERIL) 10 MG tablet  Take 10 mg by mouth 3 (Three) Times a Day As Needed for Muscle Spasms.             furosemide (LASIX) 40 MG tablet  Take 40 mg by mouth 2 (Two) Times a Day.             hydrALAZINE (APRESOLINE) 50 MG tablet  Take 50 mg by mouth 3 (Three) Times a Day.             HYDROcodone-acetaminophen (NORCO)  MG per tablet  Take 1 tablet by mouth Every 6 (Six) Hours As Needed for moderate pain (4-6).             lisinopril (PRINIVIL,ZESTRIL) 40 MG tablet  Take 40 mg by mouth Daily.             magnesium oxide (MAGOX) 400 (241.3 MG) MG tablet tablet  Take 400 mg by mouth 2 (Two) Times a Day.             metFORMIN (GLUCOPHAGE) 500 MG tablet  Take 500 mg by mouth 2 (Two) Times a Day With Meals.             NIFEdipine XL (NIFEDICAL XL) 30 MG 24 hr tablet  Take 30 mg by mouth Daily.             omeprazole (priLOSEC) 20 MG capsule  Take 20 mg PO BID for 1 month then resume daily dosing.             Polysacch Fe Cmp-Fe  Heme Poly (FEOSOL BIFERA) 28 MG tablet  Take 1 tablet by mouth Daily.             simvastatin (ZOCOR) 20 MG tablet  Take 20 mg by mouth Every Night.                   Discharge Disposition:  Home or Self Care    Discharge Diet: CC2, cardiac      Discharge Activity: As tolerated      Special Instructions:  Stop NSAIDs indefinitely. Take BID omeprazole for 1 month then resume prior daily dosing. Take iron supplementation daily. See your PCP in 1-2 weeks.    No future appointments.    Additional Instructions for the Follow-ups that You Need to Schedule     Discharge Follow-up with PCP    As directed    Follow Up Details:  1 week hospital follow up           CBC (No Diff)    Dec 22, 2017 (Approximate)                  Time Spent on Discharge:  45 minutes    Shelley Mojica II, DO  12/17/17  12:31 PM       Electronically signed by Shelley Mojica II, DO at 12/17/2017 12:38 PM        Discharge Order     Start     Ordered    12/17/17 1230  Discharge patient  Once     Comments:  After blood transfusion if repeat hemoglobin > 7.   Expected Discharge Date:  12/17/17    Discharge Disposition:  Home or Self Care        12/17/17 1231

## 2020-04-21 ENCOUNTER — APPOINTMENT (OUTPATIENT)
Dept: CT IMAGING | Facility: HOSPITAL | Age: 65
End: 2020-04-21

## 2020-04-21 ENCOUNTER — HOSPITAL ENCOUNTER (INPATIENT)
Facility: HOSPITAL | Age: 65
LOS: 6 days | Discharge: HOME HEALTH | End: 2020-04-27
Attending: EMERGENCY MEDICINE | Admitting: SURGERY

## 2020-04-21 DIAGNOSIS — K63.1 BOWEL PERFORATION (HCC): ICD-10-CM

## 2020-04-21 DIAGNOSIS — J44.1 CHRONIC OBSTRUCTIVE PULMONARY DISEASE WITH (ACUTE) EXACERBATION (HCC): ICD-10-CM

## 2020-04-21 DIAGNOSIS — F10.10 CHRONIC ALCOHOL ABUSE: ICD-10-CM

## 2020-04-21 DIAGNOSIS — K63.1 PERFORATED BOWEL (HCC): ICD-10-CM

## 2020-04-21 DIAGNOSIS — R10.9 ABDOMINAL PAIN, UNSPECIFIED ABDOMINAL LOCATION: Primary | ICD-10-CM

## 2020-04-21 DIAGNOSIS — R10.31 ABDOMINAL PAIN, RIGHT LOWER QUADRANT: ICD-10-CM

## 2020-04-21 DIAGNOSIS — R11.0 NAUSEA: ICD-10-CM

## 2020-04-21 PROBLEM — G89.29 CHRONIC PAIN: Status: ACTIVE | Noted: 2020-04-21

## 2020-04-21 PROBLEM — R09.02 HYPOXIA: Status: ACTIVE | Noted: 2020-04-21

## 2020-04-21 LAB
ALBUMIN SERPL-MCNC: 4.3 G/DL (ref 3.5–5.2)
ALBUMIN/GLOB SERPL: 1.5 G/DL
ALP SERPL-CCNC: 78 U/L (ref 39–117)
ALT SERPL W P-5'-P-CCNC: 18 U/L (ref 1–41)
ANION GAP SERPL CALCULATED.3IONS-SCNC: 12 MMOL/L (ref 5–15)
AST SERPL-CCNC: 20 U/L (ref 1–40)
BASOPHILS # BLD AUTO: 0.03 10*3/MM3 (ref 0–0.2)
BASOPHILS NFR BLD AUTO: 0.2 % (ref 0–1.5)
BILIRUB SERPL-MCNC: 0.7 MG/DL (ref 0.2–1.2)
BILIRUB UR QL STRIP: NEGATIVE
BUN BLD-MCNC: 18 MG/DL (ref 8–23)
BUN/CREAT SERPL: 24 (ref 7–25)
CALCIUM SPEC-SCNC: 9.4 MG/DL (ref 8.6–10.5)
CHLORIDE SERPL-SCNC: 94 MMOL/L (ref 98–107)
CLARITY UR: CLEAR
CO2 SERPL-SCNC: 25 MMOL/L (ref 22–29)
COLOR UR: YELLOW
CREAT BLD-MCNC: 0.75 MG/DL (ref 0.76–1.27)
D-LACTATE SERPL-SCNC: 1.2 MMOL/L (ref 0.5–2)
DEPRECATED RDW RBC AUTO: 49.3 FL (ref 37–54)
EOSINOPHIL # BLD AUTO: 0.12 10*3/MM3 (ref 0–0.4)
EOSINOPHIL NFR BLD AUTO: 0.9 % (ref 0.3–6.2)
ERYTHROCYTE [DISTWIDTH] IN BLOOD BY AUTOMATED COUNT: 13.7 % (ref 12.3–15.4)
GFR SERPL CREATININE-BSD FRML MDRD: 104 ML/MIN/1.73
GLOBULIN UR ELPH-MCNC: 2.9 GM/DL
GLUCOSE BLD-MCNC: 123 MG/DL (ref 65–99)
GLUCOSE BLDC GLUCOMTR-MCNC: 111 MG/DL (ref 70–130)
GLUCOSE BLDC GLUCOMTR-MCNC: 113 MG/DL (ref 70–130)
GLUCOSE UR STRIP-MCNC: NEGATIVE MG/DL
HCT VFR BLD AUTO: 40 % (ref 37.5–51)
HGB BLD-MCNC: 13 G/DL (ref 13–17.7)
HGB UR QL STRIP.AUTO: NEGATIVE
HOLD SPECIMEN: NORMAL
HOLD SPECIMEN: NORMAL
IMM GRANULOCYTES # BLD AUTO: 0.04 10*3/MM3 (ref 0–0.05)
IMM GRANULOCYTES NFR BLD AUTO: 0.3 % (ref 0–0.5)
KETONES UR QL STRIP: NEGATIVE
LEUKOCYTE ESTERASE UR QL STRIP.AUTO: NEGATIVE
LIPASE SERPL-CCNC: 22 U/L (ref 13–60)
LYMPHOCYTES # BLD AUTO: 0.93 10*3/MM3 (ref 0.7–3.1)
LYMPHOCYTES NFR BLD AUTO: 6.8 % (ref 19.6–45.3)
MCH RBC QN AUTO: 31.8 PG (ref 26.6–33)
MCHC RBC AUTO-ENTMCNC: 32.5 G/DL (ref 31.5–35.7)
MCV RBC AUTO: 97.8 FL (ref 79–97)
MONOCYTES # BLD AUTO: 1.17 10*3/MM3 (ref 0.1–0.9)
MONOCYTES NFR BLD AUTO: 8.5 % (ref 5–12)
NEUTROPHILS # BLD AUTO: 11.48 10*3/MM3 (ref 1.7–7)
NEUTROPHILS NFR BLD AUTO: 83.3 % (ref 42.7–76)
NITRITE UR QL STRIP: NEGATIVE
NRBC BLD AUTO-RTO: 0 /100 WBC (ref 0–0.2)
PH UR STRIP.AUTO: <=5 [PH] (ref 5–8)
PLATELET # BLD AUTO: 261 10*3/MM3 (ref 140–450)
PMV BLD AUTO: 10.1 FL (ref 6–12)
POTASSIUM BLD-SCNC: 4.7 MMOL/L (ref 3.5–5.2)
PROT SERPL-MCNC: 7.2 G/DL (ref 6–8.5)
PROT UR QL STRIP: NEGATIVE
RBC # BLD AUTO: 4.09 10*6/MM3 (ref 4.14–5.8)
SODIUM BLD-SCNC: 131 MMOL/L (ref 136–145)
SP GR UR STRIP: 1.03 (ref 1–1.03)
UROBILINOGEN UR QL STRIP: ABNORMAL
WBC NRBC COR # BLD: 13.77 10*3/MM3 (ref 3.4–10.8)
WHOLE BLOOD HOLD SPECIMEN: NORMAL
WHOLE BLOOD HOLD SPECIMEN: NORMAL

## 2020-04-21 PROCEDURE — 82962 GLUCOSE BLOOD TEST: CPT

## 2020-04-21 PROCEDURE — 25010000002 HYDROMORPHONE PER 4 MG: Performed by: SURGERY

## 2020-04-21 PROCEDURE — 25010000002 HYDROMORPHONE PER 4 MG: Performed by: EMERGENCY MEDICINE

## 2020-04-21 PROCEDURE — 25010000002 HYDROMORPHONE PER 4 MG: Performed by: INTERNAL MEDICINE

## 2020-04-21 PROCEDURE — G0378 HOSPITAL OBSERVATION PER HR: HCPCS

## 2020-04-21 PROCEDURE — 83605 ASSAY OF LACTIC ACID: CPT | Performed by: EMERGENCY MEDICINE

## 2020-04-21 PROCEDURE — 74177 CT ABD & PELVIS W/CONTRAST: CPT

## 2020-04-21 PROCEDURE — 85025 COMPLETE CBC W/AUTO DIFF WBC: CPT | Performed by: EMERGENCY MEDICINE

## 2020-04-21 PROCEDURE — 80053 COMPREHEN METABOLIC PANEL: CPT | Performed by: EMERGENCY MEDICINE

## 2020-04-21 PROCEDURE — 99222 1ST HOSP IP/OBS MODERATE 55: CPT | Performed by: INTERNAL MEDICINE

## 2020-04-21 PROCEDURE — 99285 EMERGENCY DEPT VISIT HI MDM: CPT

## 2020-04-21 PROCEDURE — 81003 URINALYSIS AUTO W/O SCOPE: CPT | Performed by: EMERGENCY MEDICINE

## 2020-04-21 PROCEDURE — 25010000002 METHYLNALTREXONE 12 MG/0.6ML SOLUTION: Performed by: SURGERY

## 2020-04-21 PROCEDURE — 83690 ASSAY OF LIPASE: CPT | Performed by: EMERGENCY MEDICINE

## 2020-04-21 PROCEDURE — 25010000002 METHYLNALTREXONE 12 MG/0.6ML SOLUTION: Performed by: INTERNAL MEDICINE

## 2020-04-21 PROCEDURE — 25010000002 ONDANSETRON PER 1 MG: Performed by: PHYSICIAN ASSISTANT

## 2020-04-21 PROCEDURE — 25010000002 IOPAMIDOL 61 % SOLUTION: Performed by: EMERGENCY MEDICINE

## 2020-04-21 PROCEDURE — 25010000002 MORPHINE PER 10 MG: Performed by: EMERGENCY MEDICINE

## 2020-04-21 RX ORDER — BISACODYL 10 MG
10 SUPPOSITORY, RECTAL RECTAL DAILY PRN
Status: DISCONTINUED | OUTPATIENT
Start: 2020-04-21 | End: 2020-04-26

## 2020-04-21 RX ORDER — LORAZEPAM 2 MG/ML
1 INJECTION INTRAMUSCULAR
Status: DISCONTINUED | OUTPATIENT
Start: 2020-04-21 | End: 2020-04-27 | Stop reason: HOSPADM

## 2020-04-21 RX ORDER — ALLOPURINOL 300 MG/1
300 TABLET ORAL DAILY
Status: ON HOLD | COMMUNITY
End: 2020-11-19

## 2020-04-21 RX ORDER — ONDANSETRON 2 MG/ML
4 INJECTION INTRAMUSCULAR; INTRAVENOUS ONCE
Status: COMPLETED | OUTPATIENT
Start: 2020-04-21 | End: 2020-04-21

## 2020-04-21 RX ORDER — SODIUM CHLORIDE, SODIUM LACTATE, POTASSIUM CHLORIDE, CALCIUM CHLORIDE 600; 310; 30; 20 MG/100ML; MG/100ML; MG/100ML; MG/100ML
100 INJECTION, SOLUTION INTRAVENOUS CONTINUOUS
Status: DISCONTINUED | OUTPATIENT
Start: 2020-04-21 | End: 2020-04-22

## 2020-04-21 RX ORDER — ACETAMINOPHEN 325 MG/1
650 TABLET ORAL EVERY 4 HOURS PRN
Status: DISCONTINUED | OUTPATIENT
Start: 2020-04-21 | End: 2020-04-27 | Stop reason: HOSPADM

## 2020-04-21 RX ORDER — NICOTINE 21 MG/24HR
1 PATCH, TRANSDERMAL 24 HOURS TRANSDERMAL DAILY PRN
Status: DISCONTINUED | OUTPATIENT
Start: 2020-04-21 | End: 2020-04-27 | Stop reason: HOSPADM

## 2020-04-21 RX ORDER — ATORVASTATIN CALCIUM 10 MG/1
10 TABLET, FILM COATED ORAL NIGHTLY
Status: DISCONTINUED | OUTPATIENT
Start: 2020-04-21 | End: 2020-04-27 | Stop reason: HOSPADM

## 2020-04-21 RX ORDER — CARVEDILOL 12.5 MG/1
12.5 TABLET ORAL 2 TIMES DAILY WITH MEALS
Status: DISCONTINUED | OUTPATIENT
Start: 2020-04-21 | End: 2020-04-27 | Stop reason: HOSPADM

## 2020-04-21 RX ORDER — FERROUS SULFATE 325(65) MG
325 TABLET ORAL EVERY OTHER DAY
COMMUNITY

## 2020-04-21 RX ORDER — HYDRALAZINE HYDROCHLORIDE 50 MG/1
50 TABLET, FILM COATED ORAL 3 TIMES DAILY
Status: DISCONTINUED | OUTPATIENT
Start: 2020-04-21 | End: 2020-04-27 | Stop reason: HOSPADM

## 2020-04-21 RX ORDER — MORPHINE SULFATE 4 MG/ML
4 INJECTION, SOLUTION INTRAMUSCULAR; INTRAVENOUS ONCE
Status: COMPLETED | OUTPATIENT
Start: 2020-04-21 | End: 2020-04-21

## 2020-04-21 RX ORDER — ONDANSETRON 2 MG/ML
4 INJECTION INTRAMUSCULAR; INTRAVENOUS EVERY 6 HOURS PRN
Status: DISCONTINUED | OUTPATIENT
Start: 2020-04-21 | End: 2020-04-27 | Stop reason: HOSPADM

## 2020-04-21 RX ORDER — ACETAMINOPHEN 160 MG/5ML
650 SOLUTION ORAL EVERY 4 HOURS PRN
Status: DISCONTINUED | OUTPATIENT
Start: 2020-04-21 | End: 2020-04-27 | Stop reason: HOSPADM

## 2020-04-21 RX ORDER — SIMETHICONE 80 MG
80 TABLET,CHEWABLE ORAL 4 TIMES DAILY PRN
Status: DISCONTINUED | OUTPATIENT
Start: 2020-04-21 | End: 2020-04-27 | Stop reason: HOSPADM

## 2020-04-21 RX ORDER — PANTOPRAZOLE SODIUM 40 MG/1
40 TABLET, DELAYED RELEASE ORAL DAILY
COMMUNITY

## 2020-04-21 RX ORDER — ALLOPURINOL 300 MG/1
300 TABLET ORAL DAILY
Status: DISCONTINUED | OUTPATIENT
Start: 2020-04-21 | End: 2020-04-27 | Stop reason: HOSPADM

## 2020-04-21 RX ORDER — BISACODYL 5 MG/1
10 TABLET, DELAYED RELEASE ORAL DAILY
Status: DISCONTINUED | OUTPATIENT
Start: 2020-04-21 | End: 2020-04-26

## 2020-04-21 RX ORDER — SODIUM CHLORIDE 0.9 % (FLUSH) 0.9 %
10 SYRINGE (ML) INJECTION AS NEEDED
Status: DISCONTINUED | OUTPATIENT
Start: 2020-04-21 | End: 2020-04-27 | Stop reason: HOSPADM

## 2020-04-21 RX ORDER — PANTOPRAZOLE SODIUM 40 MG/1
40 TABLET, DELAYED RELEASE ORAL
Status: DISCONTINUED | OUTPATIENT
Start: 2020-04-22 | End: 2020-04-27 | Stop reason: HOSPADM

## 2020-04-21 RX ORDER — AMOXICILLIN 250 MG
2 CAPSULE ORAL 2 TIMES DAILY PRN
Status: DISCONTINUED | OUTPATIENT
Start: 2020-04-21 | End: 2020-04-27 | Stop reason: HOSPADM

## 2020-04-21 RX ORDER — DEXTROSE MONOHYDRATE 25 G/50ML
25 INJECTION, SOLUTION INTRAVENOUS
Status: DISCONTINUED | OUTPATIENT
Start: 2020-04-21 | End: 2020-04-27 | Stop reason: HOSPADM

## 2020-04-21 RX ORDER — LORAZEPAM 1 MG/1
1 TABLET ORAL
Status: DISCONTINUED | OUTPATIENT
Start: 2020-04-21 | End: 2020-04-27 | Stop reason: HOSPADM

## 2020-04-21 RX ORDER — NICOTINE POLACRILEX 4 MG
15 LOZENGE BUCCAL
Status: DISCONTINUED | OUTPATIENT
Start: 2020-04-21 | End: 2020-04-27 | Stop reason: HOSPADM

## 2020-04-21 RX ORDER — HYDROMORPHONE HYDROCHLORIDE 1 MG/ML
0.5 INJECTION, SOLUTION INTRAMUSCULAR; INTRAVENOUS; SUBCUTANEOUS ONCE
Status: COMPLETED | OUTPATIENT
Start: 2020-04-21 | End: 2020-04-21

## 2020-04-21 RX ORDER — HYDROMORPHONE HYDROCHLORIDE 1 MG/ML
0.5 INJECTION, SOLUTION INTRAMUSCULAR; INTRAVENOUS; SUBCUTANEOUS
Status: DISCONTINUED | OUTPATIENT
Start: 2020-04-21 | End: 2020-04-27 | Stop reason: HOSPADM

## 2020-04-21 RX ORDER — HYDROCODONE BITARTRATE AND ACETAMINOPHEN 10; 325 MG/1; MG/1
1 TABLET ORAL EVERY 8 HOURS PRN
Status: DISCONTINUED | OUTPATIENT
Start: 2020-04-21 | End: 2020-04-27 | Stop reason: HOSPADM

## 2020-04-21 RX ORDER — FERROUS SULFATE 325(65) MG
325 TABLET ORAL EVERY OTHER DAY
Status: DISCONTINUED | OUTPATIENT
Start: 2020-04-21 | End: 2020-04-27 | Stop reason: HOSPADM

## 2020-04-21 RX ORDER — GABAPENTIN 300 MG/1
300 CAPSULE ORAL 3 TIMES DAILY PRN
Status: DISCONTINUED | OUTPATIENT
Start: 2020-04-21 | End: 2020-04-27 | Stop reason: HOSPADM

## 2020-04-21 RX ORDER — POTASSIUM CHLORIDE 750 MG/1
10 TABLET, EXTENDED RELEASE ORAL 2 TIMES DAILY PRN
COMMUNITY
End: 2020-11-17

## 2020-04-21 RX ORDER — ONDANSETRON 4 MG/1
4 TABLET, FILM COATED ORAL EVERY 6 HOURS PRN
Status: DISCONTINUED | OUTPATIENT
Start: 2020-04-21 | End: 2020-04-27 | Stop reason: HOSPADM

## 2020-04-21 RX ORDER — GABAPENTIN 300 MG/1
300 CAPSULE ORAL 3 TIMES DAILY
COMMUNITY

## 2020-04-21 RX ORDER — SODIUM CHLORIDE 0.9 % (FLUSH) 0.9 %
10 SYRINGE (ML) INJECTION EVERY 12 HOURS SCHEDULED
Status: DISCONTINUED | OUTPATIENT
Start: 2020-04-21 | End: 2020-04-27 | Stop reason: HOSPADM

## 2020-04-21 RX ORDER — LISINOPRIL 20 MG/1
20 TABLET ORAL DAILY
Status: DISCONTINUED | OUTPATIENT
Start: 2020-04-21 | End: 2020-04-27 | Stop reason: HOSPADM

## 2020-04-21 RX ORDER — ACETAMINOPHEN 650 MG/1
650 SUPPOSITORY RECTAL EVERY 4 HOURS PRN
Status: DISCONTINUED | OUTPATIENT
Start: 2020-04-21 | End: 2020-04-27 | Stop reason: HOSPADM

## 2020-04-21 RX ORDER — LORAZEPAM 2 MG/ML
0.5 INJECTION INTRAMUSCULAR
Status: DISCONTINUED | OUTPATIENT
Start: 2020-04-21 | End: 2020-04-27 | Stop reason: HOSPADM

## 2020-04-21 RX ORDER — IPRATROPIUM BROMIDE AND ALBUTEROL SULFATE 2.5; .5 MG/3ML; MG/3ML
3 SOLUTION RESPIRATORY (INHALATION) EVERY 6 HOURS PRN
Status: DISCONTINUED | OUTPATIENT
Start: 2020-04-21 | End: 2020-04-27 | Stop reason: HOSPADM

## 2020-04-21 RX ORDER — LORAZEPAM 0.5 MG/1
0.5 TABLET ORAL
Status: DISCONTINUED | OUTPATIENT
Start: 2020-04-21 | End: 2020-04-27 | Stop reason: HOSPADM

## 2020-04-21 RX ORDER — DOCUSATE SODIUM 100 MG/1
100 CAPSULE, LIQUID FILLED ORAL 2 TIMES DAILY
Status: DISCONTINUED | OUTPATIENT
Start: 2020-04-21 | End: 2020-04-26

## 2020-04-21 RX ADMIN — DOCUSATE SODIUM 100 MG: 100 CAPSULE ORAL at 15:43

## 2020-04-21 RX ADMIN — BISACODYL 10 MG: 5 TABLET, COATED ORAL at 15:42

## 2020-04-21 RX ADMIN — MORPHINE SULFATE 4 MG: 4 INJECTION, SOLUTION INTRAMUSCULAR; INTRAVENOUS at 09:45

## 2020-04-21 RX ADMIN — ATORVASTATIN CALCIUM 10 MG: 10 TABLET, FILM COATED ORAL at 21:13

## 2020-04-21 RX ADMIN — IOPAMIDOL 95 ML: 612 INJECTION, SOLUTION INTRAVENOUS at 10:40

## 2020-04-21 RX ADMIN — HYDRALAZINE HYDROCHLORIDE 50 MG: 50 TABLET, FILM COATED ORAL at 15:42

## 2020-04-21 RX ADMIN — METHYLNALTREXONE BROMIDE 12 MG: 12 INJECTION, SOLUTION SUBCUTANEOUS at 16:06

## 2020-04-21 RX ADMIN — ALLOPURINOL 300 MG: 300 TABLET ORAL at 16:05

## 2020-04-21 RX ADMIN — SIMETHICONE CHEW TAB 80 MG 80 MG: 80 TABLET ORAL at 21:36

## 2020-04-21 RX ADMIN — HYDROMORPHONE HYDROCHLORIDE 0.5 MG: 1 INJECTION, SOLUTION INTRAMUSCULAR; INTRAVENOUS; SUBCUTANEOUS at 10:59

## 2020-04-21 RX ADMIN — FERROUS SULFATE TAB 325 MG (65 MG ELEMENTAL FE) 325 MG: 325 (65 FE) TAB at 15:57

## 2020-04-21 RX ADMIN — CARVEDILOL 12.5 MG: 12.5 TABLET, FILM COATED ORAL at 18:33

## 2020-04-21 RX ADMIN — HYDROMORPHONE HYDROCHLORIDE 0.5 MG: 1 INJECTION, SOLUTION INTRAMUSCULAR; INTRAVENOUS; SUBCUTANEOUS at 15:43

## 2020-04-21 RX ADMIN — HYDROMORPHONE HYDROCHLORIDE 0.5 MG: 1 INJECTION, SOLUTION INTRAMUSCULAR; INTRAVENOUS; SUBCUTANEOUS at 12:44

## 2020-04-21 RX ADMIN — SODIUM CHLORIDE 1000 ML: 9 INJECTION, SOLUTION INTRAVENOUS at 09:45

## 2020-04-21 RX ADMIN — SODIUM CHLORIDE, POTASSIUM CHLORIDE, SODIUM LACTATE AND CALCIUM CHLORIDE 100 ML/HR: 600; 310; 30; 20 INJECTION, SOLUTION INTRAVENOUS at 15:41

## 2020-04-21 RX ADMIN — HYDROMORPHONE HYDROCHLORIDE 0.5 MG: 1 INJECTION, SOLUTION INTRAMUSCULAR; INTRAVENOUS; SUBCUTANEOUS at 21:13

## 2020-04-21 RX ADMIN — ONDANSETRON 4 MG: 2 INJECTION INTRAMUSCULAR; INTRAVENOUS at 09:45

## 2020-04-21 RX ADMIN — HYDROCODONE BITARTRATE AND ACETAMINOPHEN 1 TABLET: 10; 325 TABLET ORAL at 21:13

## 2020-04-21 RX ADMIN — LISINOPRIL 20 MG: 20 TABLET ORAL at 15:42

## 2020-04-21 NOTE — ED PROVIDER NOTES
Subjective   Patient presents complaining of right lower quadrant abdominal pain.  He reports he had gotten up early this morning to get a drink of water and when he laid back down and turned over he developed pain.  He denies any fever.  He had nausea without vomiting.  He denies any diarrhea and did have a normal bowel movement this morning.  He reports he drinks 6-7 beers daily.      Abdominal Pain   Pain location:  RLQ  Pain quality: sharp    Pain radiates to:  Periumbilical region  Pain severity:  Severe  Onset quality:  Sudden  Duration: 0300.  Timing:  Constant  Progression:  Worsening  Chronicity:  New  Context: alcohol use    Relieved by:  Nothing  Worsened by:  Nothing  Ineffective treatments: norco.  Associated symptoms: nausea    Associated symptoms: no chills, no dysuria, no fever and no vomiting        Review of Systems   Constitutional: Negative for chills and fever.   Gastrointestinal: Positive for abdominal pain and nausea. Negative for vomiting.   Genitourinary: Negative for dysuria and frequency.   All other systems reviewed and are negative.      Past Medical History:   Diagnosis Date   • Anemia    • Arthritis    • Diabetes mellitus (CMS/HCC)    • GERD (gastroesophageal reflux disease)    • Gout    • Hyperlipidemia    • Hypertension        No Known Allergies    Past Surgical History:   Procedure Laterality Date   • COLONOSCOPY     • ENDOSCOPY N/A 12/17/2017    Procedure: ESOPHAGOGASTRODUODENOSCOPY;  Surgeon: Mark I Brunner, MD;  Location: [facility] ENDOSCOPY;  Service:    • HERNIA REPAIR         Family History   Family history unknown: Yes       Social History     Socioeconomic History   • Marital status:      Spouse name: Not on file   • Number of children: Not on file   • Years of education: Not on file   • Highest education level: Not on file   Tobacco Use   • Smoking status: Current Every Day Smoker     Packs/day: 1.00     Types: Cigarettes   • Smokeless tobacco: Never Used   Substance  and Sexual Activity   • Alcohol use: Yes     Alcohol/week: 49.0 standard drinks     Types: 49 Cans of beer per week     Comment: 6-7 BEERS DAILY   • Drug use: No   • Sexual activity: Defer           Objective   Physical Exam   Constitutional: He is oriented to person, place, and time. He appears well-developed and well-nourished.   HENT:   Head: Normocephalic and atraumatic.   Eyes: EOM are normal. No scleral icterus.   Cardiovascular: Normal rate, regular rhythm and normal heart sounds. Exam reveals no gallop and no friction rub.   No murmur heard.  Pulmonary/Chest: Effort normal and breath sounds normal. No respiratory distress. He has no wheezes. He has no rales.   Abdominal: Soft. Normal appearance and bowel sounds are normal. There is tenderness in the right lower quadrant, periumbilical area and left lower quadrant. There is guarding.   Neurological: He is alert and oriented to person, place, and time.   Skin: Skin is warm and dry.   Psychiatric: He has a normal mood and affect. His behavior is normal.       Procedures           ED Course  ED Course as of Apr 21 1640 Tue Apr 21, 2020   0954 WBC(!): 13.77 [WT]   1052 Creatinine(!): 0.75 [WT]      ED Course User Index  [WT] Brittani Giraldo PA-C                                           [facility]  Number of Diagnoses or Management Options  Abdominal pain, unspecified abdominal location:   Diagnosis management comments: Patient presents complaining of abdominal pain.  He has significant tenderness and guarding on exam.  His CT abdomen and pelvis reveals only enteritis and some mild fluid.  Given his amount of tenderness we did speak with general surgery and will admit the patient for intractable abdominal pain.  He was evaluated by Dr. Nugent while in the emergency department.  Patient is agreeable to plan.      Final diagnoses:   Abdominal pain, unspecified abdominal location   Nausea            Brittani Giraldo PA-C  04/21/20 1640

## 2020-04-21 NOTE — CONSULTS
Patient Name:  Damion Llanos  YOB: 1955  [id number]       Patient Care Team:  Konstantin Palm MD as PCP - General (Internal Medicine)      General Surgery Consult Note     Date of Consultation: 04/21/20    Consulting Physician - Cornelius Xavier DO    Reason for Consult - Abdominal pain    Subjective     I have been asked to see  Damion Llanos , a 64 y.o. male in consultation for abdominal pain.  He reports that he was awoken at 3 AM this morning with abdominal pain in the right lower quadrant.  This subsequently worsened, and expanded across his lower abdomen eventually up along his anterior abdominal wall.  This is associated with some nausea and some dry heaves.  He does report tolerating a little bit of p.o. intake.  He also took a Lortab at home, which he has for back pain.  He reports a normal bowel movement this morning without bright red blood per rectum or melena.  It was a normal caliber and consistency.  Due to his abdominal pain he presented to the emergency department.  Subsequent evaluation there was largely unremarkable with a mildly elevated white count of 13,000 and a CT scan that showed only trace amount of free fluid in the left abdomen.  He is being admitted by the hospitalist for further evaluation and we have been asked to see him for possible surgical management.  Currently he is relaxed in bed complaining of abdominal pain.    Of note, he smokes at least 1 pack/day.  He also reports drinking at least 7-8 beers daily.       Allergy: No Known Allergies    Medications:        No current facility-administered medications on file prior to encounter.      Current Outpatient Medications on File Prior to Encounter   Medication Sig   • albuterol (PROVENTIL HFA;VENTOLIN HFA) 108 (90 Base) MCG/ACT inhaler Inhale 2 puffs Every 4 (Four) Hours As Needed for Wheezing.   • allopurinol (ZYLOPRIM) 300 MG tablet Take 300 mg by mouth Daily.   • carvedilol (COREG) 12.5 MG tablet Take 12.5  mg by mouth 2 (Two) Times a Day With Meals.   • ferrous sulfate 325 (65 FE) MG tablet Take 325 mg by mouth Every Other Day.   • furosemide (LASIX) 40 MG tablet Take 40 mg by mouth 2 (Two) Times a Day.   • gabapentin (NEURONTIN) 300 MG capsule Take 300 mg by mouth 3 (Three) Times a Day.   • hydrALAZINE (APRESOLINE) 50 MG tablet Take 50 mg by mouth 3 (Three) Times a Day.   • HYDROcodone-acetaminophen (NORCO)  MG per tablet Take 1 tablet by mouth Every 8 (Eight) Hours As Needed for Moderate Pain .   • lisinopril (PRINIVIL,ZESTRIL) 40 MG tablet Take 20 mg by mouth Daily.   • metFORMIN (GLUCOPHAGE) 500 MG tablet Take 500 mg by mouth 2 (Two) Times a Day With Meals.   • pantoprazole (PROTONIX) 40 MG EC tablet Take 40 mg by mouth Daily.   • potassium chloride (K-DUR,KLOR-CON) 10 MEQ CR tablet Take 10 mEq by mouth 2 (Two) Times a Day As Needed.   • simvastatin (ZOCOR) 20 MG tablet Take 20 mg by mouth Every Night.   • aspirin 81 MG chewable tablet Chew 81 mg Daily.   • cyclobenzaprine (FLEXERIL) 10 MG tablet Take 10 mg by mouth 3 (Three) Times a Day As Needed for Muscle Spasms.   • magnesium oxide (MAGOX) 400 (241.3 MG) MG tablet tablet Take 400 mg by mouth 2 (Two) Times a Day.   • NIFEdipine XL (NIFEDICAL XL) 30 MG 24 hr tablet Take 30 mg by mouth Daily.   • omeprazole (priLOSEC) 20 MG capsule Take 1 capsule by mouth twice daily for 1 month then resume daily dosing.   • Polysacch Fe Cmp-Fe Heme Poly (FEOSOL BIFERA) 28 MG tablet Take 1 tablet by mouth Daily.       PMHx:   Past Medical History:   Diagnosis Date   • Anemia    • Arthritis    • Diabetes mellitus (CMS/HCC)    • GERD (gastroesophageal reflux disease)    • Gout    • Hyperlipidemia    • Hypertension    - Tobacco abuse  - EtOH abuse  - Obesity with BMI 36    Past Surgical History:  Open umbilical hernia repair    Family History: (+) Brain tumor, mother    Social History:    Tobacco use: 1 ppd x 50 years    EtOH use : 7-8 beers nightly   Illicit drug use: Denies  "     Review of Systems        Constitutional: No fevers, chills or malaise   Eyes: Denies visual changes    Cardiovascular: Denies chest pain, palpitations   Pulmonary: Denies cough or shortness of breath   Abdominal/ GI: See HPI    Genitourinary: Denies dysuria or hematuria   Musculoskeletal: Denies any but chronic joint aches, pains or deformities   Psychiatric: No recent mood changes   Neurologic: No paresthesias or loss of function          Objective     Physical Exam:      Vital Signs  /100   Pulse 85   Temp 98.6 °F (37 °C) (Oral)   Resp 20   Ht 172.7 cm (68\")   Wt 108 kg (237 lb)   SpO2 91%   BMI 36.04 kg/m²     Intake/Output Summary (Last 24 hours) at 4/21/2020 1257  Last data filed at 4/21/2020 1055  Gross per 24 hour   Intake 1000 ml   Output --   Net 1000 ml         Physical Exam:    Head: Normocephalic, atraumatic.   Eyes: Pupils equal, round, react to light and accommodation.   Mouth: Oral mucosa without lesions,   Neck: No masses, lymphadenopathy or carotid bruits bilaterally   CV: Rhythm  and rate regular , no  murmurs, rubs or gallops  Lungs: Clear  to auscultation bilaterally   Abdomen: Bowel sounds positive  , soft, obese, tender to palpation throughout. No hernias or masses.  Groin : No obvious hernias bilaterally   Extremities:  No cyanosis, clubbing or edema bilaterally   Lymphatics: No abnormal lymphadenopathy appreciated   Neurologic: No gross deficits       Results Review: I have personally reviewed all of the recent lab and imaging results available at this time.  Laboratory exam reveals a white count of 13,000 with a left shift.  Hemoglobin 13.0.  Platelet count is 261.  Comprehensive metabolic panel essentially normal other than a slightly low sodium of 131.  Lactate normal at 1.2.  Lipase normal at 22.    CT scan of the abdomen pelvis personally viewed by me as well as a final dictated and edited report.  This demonstrates no evidence of free air.  There is no evidence of " bowel dilation. The gallbladder is visualized and is normal.  There are no gallstones.  The appendix is visualized and is normal without inflammation.  There is a scant amount of free fluid in the left lateral abdomen of unclear etiology.  There is no evidence of bowel wall thickening though any enteritis cannot be completely excluded.  No free air of note.  No abdominal wall hernias of note.       Assessment/Plan     Assessment and Plan:    Abdominal pain - He appears quite tender, though the cause of his abdominal pain is not clear.  I agree with admission, IV hydration and pain control.  I will continue to follow this patient with you.  If he is not improved in 24 to 48 hours you may need to reimage the patient.     Tobacco abuse  Alcohol abuse -  I would also be sure to cover him for alcohol withdrawal symptoms.    Hypertension  Obesity  Diabetes  Hyperlipidemia            I discussed the patient's findings and my recommendations with the patient and/or family, as well as the primary team     El Nugent MD  04/21/20  12:57

## 2020-04-21 NOTE — H&P
"    [facility] Medicine Services  HISTORY AND PHYSICAL    Patient Name: Damion Llanos  : 1955  MRN: [id number]  Primary Care Physician: Konstantin Palm MD  Date of admission: 2020      Subjective   Subjective     Chief Complaint:  Abdominal pain    HPI:  Damion Llanos is a 64 y.o. male with DM, chronic pain from arthritis on opiates, gout, who presents with acute onset abdominal pain. He was in his usual state of health yesterday, had a large meal of \"pork beans\" and went to bed. Around 02:00-03:00 this morning he was awoken by low mid abdominal pain that progressively spread throughout the abdomen with developing distention of the abdomen. His pain feels \"like a constant push,\" without provocative features, was improved with pain meds in the ED and briefly after BM this AM which was normal. Has self attempted to make himself vomit without success. In the ED he has been requiring 2lpm NC, he cannot take a deep breath due to abdominal distention and pain. Drinks 6-7 beers per night for many years, denies Hx of withdrawal problems.    Review of Systems   Gen- No fevers, chills  CV- No chest pain, palpitations  Resp- No cough, dyspnea  GI- No N/V/D, yes abd pain    All other systems reviewed and are negative.     Personal History     Past Medical History:   Diagnosis Date   • Anemia    • Arthritis    • Diabetes mellitus (CMS/HCC)    • GERD (gastroesophageal reflux disease)    • Gout    • Hyperlipidemia    • Hypertension        Past Surgical History:   Procedure Laterality Date   • COLONOSCOPY     • ENDOSCOPY N/A 2017    Procedure: ESOPHAGOGASTRODUODENOSCOPY;  Surgeon: Mark I Brunner, MD;  Location: [facility] ENDOSCOPY;  Service:    • HERNIA REPAIR         Family History: Family history is unknown by patient. Otherwise pertinent FHx was reviewed and unremarkable.     Social History:  reports that he has been smoking cigarettes. He has been smoking about 1.00 pack per day. He has " never used smokeless tobacco. He reports that he drinks about 49.0 standard drinks of alcohol per week. He reports that he does not use drugs.  Social History     Social History Narrative   • Not on file       Medications:  Available home medication information reviewed.  Medications Prior to Admission   Medication Sig Dispense Refill Last Dose   • albuterol (PROVENTIL HFA;VENTOLIN HFA) 108 (90 Base) MCG/ACT inhaler Inhale 2 puffs Every 4 (Four) Hours As Needed for Wheezing.      • allopurinol (ZYLOPRIM) 300 MG tablet Take 300 mg by mouth Daily.      • carvedilol (COREG) 12.5 MG tablet Take 12.5 mg by mouth 2 (Two) Times a Day With Meals.   12/16/2017   • ferrous sulfate 325 (65 FE) MG tablet Take 325 mg by mouth Every Other Day.      • furosemide (LASIX) 40 MG tablet Take 40 mg by mouth 2 (Two) Times a Day.   12/16/2017   • gabapentin (NEURONTIN) 300 MG capsule Take 300 mg by mouth 3 (Three) Times a Day.      • hydrALAZINE (APRESOLINE) 50 MG tablet Take 50 mg by mouth 3 (Three) Times a Day.   12/16/2017   • HYDROcodone-acetaminophen (NORCO)  MG per tablet Take 1 tablet by mouth Every 8 (Eight) Hours As Needed for Moderate Pain .   12/16/2017   • lisinopril (PRINIVIL,ZESTRIL) 40 MG tablet Take 20 mg by mouth Daily.   12/16/2017   • metFORMIN (GLUCOPHAGE) 500 MG tablet Take 500 mg by mouth 2 (Two) Times a Day With Meals.   12/16/2017   • pantoprazole (PROTONIX) 40 MG EC tablet Take 40 mg by mouth Daily.      • potassium chloride (K-DUR,KLOR-CON) 10 MEQ CR tablet Take 10 mEq by mouth 2 (Two) Times a Day As Needed.      • simvastatin (ZOCOR) 20 MG tablet Take 20 mg by mouth Every Night.   12/15/2017       No Known Allergies    Objective   Objective     Vital Signs:   Temp:  [98.6 °F (37 °C)] 98.6 °F (37 °C)  Heart Rate:  [83-88] 88  Resp:  [20-24] 24  BP: (125-181)/() 170/96        Physical Exam   Constitutional: Awake, alert, NAD, appears uncomfortable  Eyes: PERRL, sclerae anicteric, no conjunctival  injection  HENT: NCAT, mucous membranes moist  Neck: Supple, trachea midline  Respiratory: Clear to auscultation bilaterally, slight tachypnea with shallow breaths, winces when asked to take deep breath  Cardiovascular: RRR, no murmurs, rubs, or gallops, palpable pedal pulses bilaterally  Gastrointestinal: Abdomen firm, distended, tympanic to percussion, BS noted but infrequent, slight guarding  Musculoskeletal: Mild BL LE edema, no clubbing or cyanosis to extremities  Psychiatric: Appropriate affect, cooperative  Neurologic: Oriented x 3, speech clear    Results Reviewed:  I have personally reviewed current lab and radiology data.    Results from last 7 days   Lab Units 04/21/20  0939   WBC 10*3/mm3 13.77*   HEMOGLOBIN g/dL 13.0   HEMATOCRIT % 40.0   PLATELETS 10*3/mm3 261     Results from last 7 days   Lab Units 04/21/20  0939   SODIUM mmol/L 131*   POTASSIUM mmol/L 4.7   CHLORIDE mmol/L 94*   CO2 mmol/L 25.0   BUN mg/dL 18   CREATININE mg/dL 0.75*   GLUCOSE mg/dL 123*   CALCIUM mg/dL 9.4   ALT (SGPT) U/L 18   AST (SGOT) U/L 20   LACTATE mmol/L 1.2     Estimated Creatinine Clearance: 108.2 mL/min (A) (by C-G formula based on SCr of 0.75 mg/dL (L)).  Brief Urine Lab Results  (Last result in the past 365 days)      Color   Clarity   Blood   Leuk Est   Nitrite   Protein   CREAT   Urine HCG        04/21/20 1243 Yellow Clear Negative Negative Negative Negative             Imaging Results (Last 24 Hours)     Procedure Component Value Units Date/Time    CT Abdomen Pelvis With Contrast [[id number]] Collected:  04/21/20 1048     Updated:  04/21/20 1305    Narrative:       EXAMINATION: CT ABDOMEN/PELVIS WITH CONTRAST-04/21/2020:      INDICATION: Ventral hernia, RLQ tender, right lower quadrant tenderness.     TECHNIQUE: Multiple axial CT imaging was obtained of the abdomen and  pelvis following the administration of intravenous contrast.     The radiation dose reduction device was turned on for each scan per the  ALARA (As  Low as Reasonably Achievable) protocol.     COMPARISON: NONE.     FINDINGS:      ABDOMEN: The lung bases are grossly clear with no features of pneumonia.  The liver is homogeneous in appearance. No stones seen in the  gallbladder. The spleen is homogeneous in appearance. The kidneys and  adrenal glands are unremarkable. Vascular calcification seen within the  abdominal aorta and iliac vessels. The pancreas is homogeneous in  appearance. There is some free fluid identified along the left flank and  left paracolic gutter. The appearance of the bowel is grossly  unremarkable in appearance. There is some fluid-filled loops of small  bowel identified in which a mild enteritis cannot be completely  excluded. Etiology of the fluid is uncertain. There is no abdominal or  retroperitoneal adenopathy. Small cyst identified along the subcutaneous  tissues in the right lower back. Tiny periumbilical hernia containing  minimal fat. No evidence of strangulation.     PELVIS: The pelvic organs are unremarkable. The pelvic portions of the  gastrointestinal tract are within normal limits. No free fluid or free  air. No abnormal mass or fluid collection is identified. The appendix is  radiographically normal in appearance. No pelvic adenopathy. No abnormal  mass or fluid collection is identified. The bony structures reveal no  evidence of osseous abnormality. Degenerative changes seen within the  spine and pelvis.       Impression:       Mild fluid-filled loops of small bowel in which a mild  enteritis cannot be excluded. No significant wall thickening. Minimal  free fluid seen along the left paracolic gutter. No other CT evidence of  acute intra-abdominal or pelvic abnormality. The appendix is normal. The  lung bases are clear with no features of pneumonia.     D:  04/21/2020  E:  04/21/2020     This report was finalized on 4/21/2020 1:02 PM by Dr. Clair Pedro MD.           Results for orders placed during the hospital  encounter of 12/16/17   Adult Transthoracic Echo Complete W/ Cont if Necessary Per Protocol    Narrative · Left ventricular systolic function is normal. Estimated EF = 65%.  · The cardiac valves are anatomically and functionally normal.          Assessment/Plan   Assessment & Plan     Active Hospital Problems    Diagnosis POA   • **Abdominal pain [R10.9] Yes   • Chronic pain [G89.29] Yes   • Dependent edema [R60.9] Yes   • Tobacco abuse [Z72.0] Yes   • Primary osteoarthritis of both hips [M16.0] Yes   • Type 2 diabetes mellitus with complication, without long-term current use of insulin (CMS/HCC) [E11.8] Yes     Summary: This is a 65 y/o male with Hx DM2, HTN, gout, chronic pain with opiates, who presents with acute onset low abdominal pain with gaseous distention and CT evidence for mild enteritis    Assessment/Plan    Intractable abdominal pain  Mild enteritis  - abdominal labs unremarkable  - supportive care, IVF's today and pain control  - had a normal BM this AM  - based on exam suspect component of gaseous distention with pain, will add simethicone, as well as bowel regimen (inc relistor) to attempt to decompress his bowels  - seen by Dr. Nugent  - low threshold to re-CT abdomen if acute change in status, could be developing ileus or obstruction; clear liquids today    Acute hypoxic respiratory insufficieny due to hypoventilation  Chronic tobacco abuse  - discrepancy in the chart regarding Hx COPD or not  - PRN nebs  - NRT  - currently unable to take deep breaths due to abdominal distention, if his O2 reqs don't improve with improvement in GI syx will pursue CXR  - denies new cough, sputum production, fevers, sick contacts    Mild hypovolemic hyponatremia vs beer potomania  - IVF's today and recheck BMP in the AM    Chronic EtOH use with dependence  - drinks 6-7 beers/night for years  - denies hx of withdrawal, though noncommittal regarding if he has quit for 3 or more days in recent years  - CIWA scoring and  PRN ativan; if he develops symptoms would start oral valium scheduled    DM type 2, self reported recent a1c <7%  - on metformin only; accuchecks with SSI    Chronic venous stasis  - on lasix outpatient for edema, recently started using compression stockings which have helped  - holding diuretics while volume resuscitating    DVT prophylaxis:  mechanical    CODE STATUS:    Code Status and Medical Interventions:   Ordered at: 04/21/20 1443     Code Status:    CPR     Medical Interventions (Level of Support Prior to Arrest):    Full     Comments:    Prior       Admission Status:  I believe this patient meets OBSERVATION status, however if further evaluation or treatment plans warrant, status may change.  Based upon current information, I predict patient's care encounter to be less than or equal to 2 midnights.    Electronically signed by Cornelius Xavier DO, 04/21/20, 2:46 PM.

## 2020-04-22 ENCOUNTER — ANESTHESIA (OUTPATIENT)
Dept: PERIOP | Facility: HOSPITAL | Age: 65
End: 2020-04-22

## 2020-04-22 ENCOUNTER — APPOINTMENT (OUTPATIENT)
Dept: CT IMAGING | Facility: HOSPITAL | Age: 65
End: 2020-04-22

## 2020-04-22 ENCOUNTER — ANESTHESIA EVENT (OUTPATIENT)
Dept: PERIOP | Facility: HOSPITAL | Age: 65
End: 2020-04-22

## 2020-04-22 PROBLEM — K63.1 PERFORATED BOWEL (HCC): Status: ACTIVE | Noted: 2020-04-21

## 2020-04-22 LAB
ABO GROUP BLD: NORMAL
ANION GAP SERPL CALCULATED.3IONS-SCNC: 11 MMOL/L (ref 5–15)
APTT PPP: 34.8 SECONDS (ref 24–37)
BASOPHILS # BLD AUTO: 0.02 10*3/MM3 (ref 0–0.2)
BASOPHILS NFR BLD AUTO: 0.1 % (ref 0–1.5)
BLD GP AB SCN SERPL QL: NEGATIVE
BUN BLD-MCNC: 11 MG/DL (ref 8–23)
BUN/CREAT SERPL: 20.8 (ref 7–25)
CALCIUM SPEC-SCNC: 9.2 MG/DL (ref 8.6–10.5)
CHLORIDE SERPL-SCNC: 95 MMOL/L (ref 98–107)
CO2 SERPL-SCNC: 27 MMOL/L (ref 22–29)
CREAT BLD-MCNC: 0.53 MG/DL (ref 0.76–1.27)
D-LACTATE SERPL-SCNC: 0.9 MMOL/L (ref 0.5–2)
DEPRECATED RDW RBC AUTO: 51.7 FL (ref 37–54)
EOSINOPHIL # BLD AUTO: 0.07 10*3/MM3 (ref 0–0.4)
EOSINOPHIL NFR BLD AUTO: 0.5 % (ref 0.3–6.2)
ERYTHROCYTE [DISTWIDTH] IN BLOOD BY AUTOMATED COUNT: 13.8 % (ref 12.3–15.4)
GFR SERPL CREATININE-BSD FRML MDRD: >150 ML/MIN/1.73
GLUCOSE BLD-MCNC: 126 MG/DL (ref 65–99)
GLUCOSE BLDC GLUCOMTR-MCNC: 120 MG/DL (ref 70–130)
GLUCOSE BLDC GLUCOMTR-MCNC: 148 MG/DL (ref 70–130)
GLUCOSE BLDC GLUCOMTR-MCNC: 151 MG/DL (ref 70–130)
HCT VFR BLD AUTO: 38.1 % (ref 37.5–51)
HGB BLD-MCNC: 12.1 G/DL (ref 13–17.7)
IMM GRANULOCYTES # BLD AUTO: 0.09 10*3/MM3 (ref 0–0.05)
IMM GRANULOCYTES NFR BLD AUTO: 0.6 % (ref 0–0.5)
INR PPP: 1.28 (ref 0.85–1.16)
LYMPHOCYTES # BLD AUTO: 0.76 10*3/MM3 (ref 0.7–3.1)
LYMPHOCYTES NFR BLD AUTO: 5.1 % (ref 19.6–45.3)
MAGNESIUM SERPL-MCNC: 1.6 MG/DL (ref 1.6–2.4)
MCH RBC QN AUTO: 32.2 PG (ref 26.6–33)
MCHC RBC AUTO-ENTMCNC: 31.8 G/DL (ref 31.5–35.7)
MCV RBC AUTO: 101.3 FL (ref 79–97)
MONOCYTES # BLD AUTO: 0.97 10*3/MM3 (ref 0.1–0.9)
MONOCYTES NFR BLD AUTO: 6.5 % (ref 5–12)
NEUTROPHILS # BLD AUTO: 13 10*3/MM3 (ref 1.7–7)
NEUTROPHILS NFR BLD AUTO: 87.2 % (ref 42.7–76)
NRBC BLD AUTO-RTO: 0 /100 WBC (ref 0–0.2)
PLAT MORPH BLD: NORMAL
PLATELET # BLD AUTO: 244 10*3/MM3 (ref 140–450)
PMV BLD AUTO: 10.4 FL (ref 6–12)
POTASSIUM BLD-SCNC: 4.8 MMOL/L (ref 3.5–5.2)
PROTHROMBIN TIME: 15.7 SECONDS (ref 11.5–14)
RBC # BLD AUTO: 3.76 10*6/MM3 (ref 4.14–5.8)
RBC MORPH BLD: NORMAL
RH BLD: POSITIVE
SODIUM BLD-SCNC: 133 MMOL/L (ref 136–145)
T&S EXPIRATION DATE: NORMAL
WBC MORPH BLD: NORMAL
WBC NRBC COR # BLD: 14.91 10*3/MM3 (ref 3.4–10.8)

## 2020-04-22 PROCEDURE — 25010000002 PIPERACILLIN SOD-TAZOBACTAM PER 1 G: Performed by: SURGERY

## 2020-04-22 PROCEDURE — 25010000002 DEXAMETHASONE SODIUM PHOSPHATE 10 MG/ML SOLUTION: Performed by: NURSE ANESTHETIST, CERTIFIED REGISTERED

## 2020-04-22 PROCEDURE — 25010000003 LIDOCAINE 1 % SOLUTION: Performed by: NURSE ANESTHETIST, CERTIFIED REGISTERED

## 2020-04-22 PROCEDURE — 25010000002 METOCLOPRAMIDE PER 10 MG: Performed by: SURGERY

## 2020-04-22 PROCEDURE — 0D1B0Z4 BYPASS ILEUM TO CUTANEOUS, OPEN APPROACH: ICD-10-PCS | Performed by: SURGERY

## 2020-04-22 PROCEDURE — 25010000002 SUCCINYLCHOLINE PER 20 MG: Performed by: NURSE ANESTHETIST, CERTIFIED REGISTERED

## 2020-04-22 PROCEDURE — 25010000002 HYDROMORPHONE PER 4 MG: Performed by: NURSE PRACTITIONER

## 2020-04-22 PROCEDURE — 25010000002 HYDROMORPHONE HCL PF 500 MG/50ML SOLUTION: Performed by: SURGERY

## 2020-04-22 PROCEDURE — 86900 BLOOD TYPING SEROLOGIC ABO: CPT | Performed by: NURSE PRACTITIONER

## 2020-04-22 PROCEDURE — 85730 THROMBOPLASTIN TIME PARTIAL: CPT | Performed by: NURSE PRACTITIONER

## 2020-04-22 PROCEDURE — 25010000002 FENTANYL CITRATE (PF) 100 MCG/2ML SOLUTION: Performed by: NURSE ANESTHETIST, CERTIFIED REGISTERED

## 2020-04-22 PROCEDURE — 86901 BLOOD TYPING SEROLOGIC RH(D): CPT | Performed by: NURSE PRACTITIONER

## 2020-04-22 PROCEDURE — 94799 UNLISTED PULMONARY SVC/PX: CPT

## 2020-04-22 PROCEDURE — 82962 GLUCOSE BLOOD TEST: CPT

## 2020-04-22 PROCEDURE — 25010000002 PHENYLEPHRINE PER 1 ML: Performed by: NURSE ANESTHETIST, CERTIFIED REGISTERED

## 2020-04-22 PROCEDURE — 25010000002 METHYLNALTREXONE 12 MG/0.6ML SOLUTION: Performed by: SURGERY

## 2020-04-22 PROCEDURE — 86850 RBC ANTIBODY SCREEN: CPT | Performed by: NURSE PRACTITIONER

## 2020-04-22 PROCEDURE — 93010 ELECTROCARDIOGRAM REPORT: CPT | Performed by: INTERNAL MEDICINE

## 2020-04-22 PROCEDURE — 94640 AIRWAY INHALATION TREATMENT: CPT

## 2020-04-22 PROCEDURE — 0DTF0ZZ RESECTION OF RIGHT LARGE INTESTINE, OPEN APPROACH: ICD-10-PCS | Performed by: SURGERY

## 2020-04-22 PROCEDURE — 74176 CT ABD & PELVIS W/O CONTRAST: CPT

## 2020-04-22 PROCEDURE — 25010000002 PROPOFOL 10 MG/ML EMULSION: Performed by: NURSE ANESTHETIST, CERTIFIED REGISTERED

## 2020-04-22 PROCEDURE — 93005 ELECTROCARDIOGRAM TRACING: CPT | Performed by: ANESTHESIOLOGY

## 2020-04-22 PROCEDURE — 83735 ASSAY OF MAGNESIUM: CPT | Performed by: NURSE PRACTITIONER

## 2020-04-22 PROCEDURE — 83605 ASSAY OF LACTIC ACID: CPT | Performed by: NURSE PRACTITIONER

## 2020-04-22 PROCEDURE — 25010000002 DEXAMETHASONE SODIUM PHOSPHATE 10 MG/ML SOLUTION: Performed by: ANESTHESIOLOGY

## 2020-04-22 PROCEDURE — 25010000002 HYDROMORPHONE PER 4 MG: Performed by: INTERNAL MEDICINE

## 2020-04-22 PROCEDURE — 87040 BLOOD CULTURE FOR BACTERIA: CPT | Performed by: NURSE PRACTITIONER

## 2020-04-22 PROCEDURE — 85025 COMPLETE CBC W/AUTO DIFF WBC: CPT | Performed by: INTERNAL MEDICINE

## 2020-04-22 PROCEDURE — 80048 BASIC METABOLIC PNL TOTAL CA: CPT | Performed by: INTERNAL MEDICINE

## 2020-04-22 PROCEDURE — 25010000002 ONDANSETRON PER 1 MG: Performed by: NURSE ANESTHETIST, CERTIFIED REGISTERED

## 2020-04-22 PROCEDURE — 25010000002 HYDROMORPHONE PER 4 MG: Performed by: SURGERY

## 2020-04-22 PROCEDURE — 85610 PROTHROMBIN TIME: CPT | Performed by: NURSE PRACTITIONER

## 2020-04-22 PROCEDURE — 99291 CRITICAL CARE FIRST HOUR: CPT | Performed by: INTERNAL MEDICINE

## 2020-04-22 PROCEDURE — 99233 SBSQ HOSP IP/OBS HIGH 50: CPT | Performed by: INTERNAL MEDICINE

## 2020-04-22 PROCEDURE — 85007 BL SMEAR W/DIFF WBC COUNT: CPT | Performed by: INTERNAL MEDICINE

## 2020-04-22 PROCEDURE — 25010000002 NEOSTIGMINE 10 MG/10ML SOLUTION: Performed by: NURSE ANESTHETIST, CERTIFIED REGISTERED

## 2020-04-22 PROCEDURE — 88307 TISSUE EXAM BY PATHOLOGIST: CPT | Performed by: SURGERY

## 2020-04-22 DEVICE — PROXIMATE RELOADABLE LINEAR CUTTER WITH SAFETY LOCK-OUT, 75MM
Type: IMPLANTABLE DEVICE | Site: ABDOMEN | Status: FUNCTIONAL
Brand: PROXIMATE

## 2020-04-22 DEVICE — PROXIMATE LINEAR CUTTER RELOAD, BLUE, 75MM
Type: IMPLANTABLE DEVICE | Site: ABDOMEN | Status: FUNCTIONAL
Brand: PROXIMATE

## 2020-04-22 RX ORDER — HYDROMORPHONE HYDROCHLORIDE 1 MG/ML
0.5 INJECTION, SOLUTION INTRAMUSCULAR; INTRAVENOUS; SUBCUTANEOUS ONCE
Status: COMPLETED | OUTPATIENT
Start: 2020-04-22 | End: 2020-04-22

## 2020-04-22 RX ORDER — NEOSTIGMINE METHYLSULFATE 1 MG/ML
INJECTION, SOLUTION INTRAVENOUS AS NEEDED
Status: DISCONTINUED | OUTPATIENT
Start: 2020-04-22 | End: 2020-04-22 | Stop reason: SURG

## 2020-04-22 RX ORDER — LIDOCAINE HYDROCHLORIDE 10 MG/ML
INJECTION, SOLUTION INFILTRATION; PERINEURAL AS NEEDED
Status: DISCONTINUED | OUTPATIENT
Start: 2020-04-22 | End: 2020-04-22 | Stop reason: SURG

## 2020-04-22 RX ORDER — HYDROMORPHONE HYDROCHLORIDE 1 MG/ML
0.5 INJECTION, SOLUTION INTRAMUSCULAR; INTRAVENOUS; SUBCUTANEOUS
Status: DISCONTINUED | OUTPATIENT
Start: 2020-04-22 | End: 2020-04-22 | Stop reason: HOSPADM

## 2020-04-22 RX ORDER — MAGNESIUM HYDROXIDE 1200 MG/15ML
LIQUID ORAL AS NEEDED
Status: DISCONTINUED | OUTPATIENT
Start: 2020-04-22 | End: 2020-04-22 | Stop reason: HOSPADM

## 2020-04-22 RX ORDER — FAMOTIDINE 20 MG/1
20 TABLET, FILM COATED ORAL ONCE
Status: DISCONTINUED | OUTPATIENT
Start: 2020-04-22 | End: 2020-04-22

## 2020-04-22 RX ORDER — PROMETHAZINE HYDROCHLORIDE 25 MG/ML
12.5 INJECTION, SOLUTION INTRAMUSCULAR; INTRAVENOUS EVERY 6 HOURS PRN
Status: DISCONTINUED | OUTPATIENT
Start: 2020-04-22 | End: 2020-04-27 | Stop reason: HOSPADM

## 2020-04-22 RX ORDER — ONDANSETRON 2 MG/ML
INJECTION INTRAMUSCULAR; INTRAVENOUS AS NEEDED
Status: DISCONTINUED | OUTPATIENT
Start: 2020-04-22 | End: 2020-04-22 | Stop reason: SURG

## 2020-04-22 RX ORDER — SUCCINYLCHOLINE CHLORIDE 20 MG/ML
INJECTION INTRAMUSCULAR; INTRAVENOUS AS NEEDED
Status: DISCONTINUED | OUTPATIENT
Start: 2020-04-22 | End: 2020-04-22 | Stop reason: SURG

## 2020-04-22 RX ORDER — LIDOCAINE HYDROCHLORIDE 10 MG/ML
0.5 INJECTION, SOLUTION EPIDURAL; INFILTRATION; INTRACAUDAL; PERINEURAL ONCE AS NEEDED
Status: DISCONTINUED | OUTPATIENT
Start: 2020-04-22 | End: 2020-04-22 | Stop reason: HOSPADM

## 2020-04-22 RX ORDER — IPRATROPIUM BROMIDE AND ALBUTEROL SULFATE 2.5; .5 MG/3ML; MG/3ML
3 SOLUTION RESPIRATORY (INHALATION)
Status: DISCONTINUED | OUTPATIENT
Start: 2020-04-22 | End: 2020-04-22 | Stop reason: HOSPADM

## 2020-04-22 RX ORDER — FENTANYL CITRATE 50 UG/ML
INJECTION, SOLUTION INTRAMUSCULAR; INTRAVENOUS AS NEEDED
Status: DISCONTINUED | OUTPATIENT
Start: 2020-04-22 | End: 2020-04-22 | Stop reason: SURG

## 2020-04-22 RX ORDER — DEXAMETHASONE SODIUM PHOSPHATE 10 MG/ML
INJECTION, SOLUTION INTRAMUSCULAR; INTRAVENOUS
Status: COMPLETED | OUTPATIENT
Start: 2020-04-22 | End: 2020-04-22

## 2020-04-22 RX ORDER — ONDANSETRON 2 MG/ML
4 INJECTION INTRAMUSCULAR; INTRAVENOUS ONCE AS NEEDED
Status: DISCONTINUED | OUTPATIENT
Start: 2020-04-22 | End: 2020-04-22 | Stop reason: HOSPADM

## 2020-04-22 RX ORDER — SODIUM CHLORIDE, SODIUM LACTATE, POTASSIUM CHLORIDE, CALCIUM CHLORIDE 600; 310; 30; 20 MG/100ML; MG/100ML; MG/100ML; MG/100ML
75 INJECTION, SOLUTION INTRAVENOUS CONTINUOUS
Status: DISCONTINUED | OUTPATIENT
Start: 2020-04-22 | End: 2020-04-24

## 2020-04-22 RX ORDER — GLYCOPYRROLATE 0.2 MG/ML
INJECTION INTRAMUSCULAR; INTRAVENOUS AS NEEDED
Status: DISCONTINUED | OUTPATIENT
Start: 2020-04-22 | End: 2020-04-22 | Stop reason: SURG

## 2020-04-22 RX ORDER — SODIUM CHLORIDE 0.9 % (FLUSH) 0.9 %
10 SYRINGE (ML) INJECTION AS NEEDED
Status: DISCONTINUED | OUTPATIENT
Start: 2020-04-22 | End: 2020-04-22

## 2020-04-22 RX ORDER — FAMOTIDINE 10 MG/ML
20 INJECTION, SOLUTION INTRAVENOUS ONCE
Status: DISCONTINUED | OUTPATIENT
Start: 2020-04-22 | End: 2020-04-22

## 2020-04-22 RX ORDER — PROPOFOL 10 MG/ML
VIAL (ML) INTRAVENOUS AS NEEDED
Status: DISCONTINUED | OUTPATIENT
Start: 2020-04-22 | End: 2020-04-22 | Stop reason: SURG

## 2020-04-22 RX ORDER — METOCLOPRAMIDE HYDROCHLORIDE 5 MG/ML
10 INJECTION INTRAMUSCULAR; INTRAVENOUS EVERY 6 HOURS
Status: DISCONTINUED | OUTPATIENT
Start: 2020-04-22 | End: 2020-04-25

## 2020-04-22 RX ORDER — IPRATROPIUM BROMIDE AND ALBUTEROL SULFATE 2.5; .5 MG/3ML; MG/3ML
3 SOLUTION RESPIRATORY (INHALATION) ONCE
Status: COMPLETED | OUTPATIENT
Start: 2020-04-22 | End: 2020-04-22

## 2020-04-22 RX ORDER — ROCURONIUM BROMIDE 10 MG/ML
INJECTION, SOLUTION INTRAVENOUS AS NEEDED
Status: DISCONTINUED | OUTPATIENT
Start: 2020-04-22 | End: 2020-04-22 | Stop reason: SURG

## 2020-04-22 RX ORDER — PROMETHAZINE HYDROCHLORIDE 25 MG/ML
25 INJECTION, SOLUTION INTRAMUSCULAR; INTRAVENOUS EVERY 6 HOURS PRN
Status: DISCONTINUED | OUTPATIENT
Start: 2020-04-22 | End: 2020-04-22

## 2020-04-22 RX ORDER — FENTANYL CITRATE 50 UG/ML
50 INJECTION, SOLUTION INTRAMUSCULAR; INTRAVENOUS
Status: DISCONTINUED | OUTPATIENT
Start: 2020-04-22 | End: 2020-04-22 | Stop reason: HOSPADM

## 2020-04-22 RX ORDER — DEXAMETHASONE SODIUM PHOSPHATE 10 MG/ML
INJECTION, SOLUTION INTRAMUSCULAR; INTRAVENOUS AS NEEDED
Status: DISCONTINUED | OUTPATIENT
Start: 2020-04-22 | End: 2020-04-22 | Stop reason: SURG

## 2020-04-22 RX ORDER — DEXAMETHASONE SODIUM PHOSPHATE 4 MG/ML
8 INJECTION, SOLUTION INTRA-ARTICULAR; INTRALESIONAL; INTRAMUSCULAR; INTRAVENOUS; SOFT TISSUE ONCE AS NEEDED
Status: DISCONTINUED | OUTPATIENT
Start: 2020-04-22 | End: 2020-04-22 | Stop reason: HOSPADM

## 2020-04-22 RX ORDER — BUPIVACAINE HYDROCHLORIDE 2.5 MG/ML
INJECTION, SOLUTION EPIDURAL; INFILTRATION; INTRACAUDAL
Status: COMPLETED | OUTPATIENT
Start: 2020-04-22 | End: 2020-04-22

## 2020-04-22 RX ORDER — SODIUM CHLORIDE, SODIUM LACTATE, POTASSIUM CHLORIDE, CALCIUM CHLORIDE 600; 310; 30; 20 MG/100ML; MG/100ML; MG/100ML; MG/100ML
9 INJECTION, SOLUTION INTRAVENOUS CONTINUOUS
Status: DISCONTINUED | OUTPATIENT
Start: 2020-04-22 | End: 2020-04-22

## 2020-04-22 RX ORDER — DIPHENHYDRAMINE HYDROCHLORIDE 50 MG/ML
25 INJECTION INTRAMUSCULAR; INTRAVENOUS EVERY 6 HOURS PRN
Status: DISCONTINUED | OUTPATIENT
Start: 2020-04-22 | End: 2020-04-27 | Stop reason: HOSPADM

## 2020-04-22 RX ORDER — NALOXONE HCL 0.4 MG/ML
0.1 VIAL (ML) INJECTION
Status: DISCONTINUED | OUTPATIENT
Start: 2020-04-22 | End: 2020-04-27 | Stop reason: HOSPADM

## 2020-04-22 RX ORDER — SODIUM CHLORIDE 0.9 % (FLUSH) 0.9 %
10 SYRINGE (ML) INJECTION EVERY 12 HOURS SCHEDULED
Status: DISCONTINUED | OUTPATIENT
Start: 2020-04-22 | End: 2020-04-22

## 2020-04-22 RX ADMIN — IPRATROPIUM BROMIDE AND ALBUTEROL SULFATE 3 ML: 2.5; .5 SOLUTION RESPIRATORY (INHALATION) at 10:05

## 2020-04-22 RX ADMIN — METHYLNALTREXONE BROMIDE 4 MG: 12 INJECTION, SOLUTION SUBCUTANEOUS at 17:07

## 2020-04-22 RX ADMIN — PHENYLEPHRINE HYDROCHLORIDE 200 MCG: 10 INJECTION INTRAVENOUS at 07:29

## 2020-04-22 RX ADMIN — SODIUM CHLORIDE, POTASSIUM CHLORIDE, SODIUM LACTATE AND CALCIUM CHLORIDE 150 ML/HR: 600; 310; 30; 20 INJECTION, SOLUTION INTRAVENOUS at 10:40

## 2020-04-22 RX ADMIN — METRONIDAZOLE 500 MG: 500 INJECTION, SOLUTION INTRAVENOUS at 23:47

## 2020-04-22 RX ADMIN — PHENYLEPHRINE HYDROCHLORIDE 100 MCG: 10 INJECTION INTRAVENOUS at 07:18

## 2020-04-22 RX ADMIN — ROCURONIUM BROMIDE 10 MG: 10 INJECTION INTRAVENOUS at 08:28

## 2020-04-22 RX ADMIN — PHENYLEPHRINE HYDROCHLORIDE 200 MCG: 10 INJECTION INTRAVENOUS at 07:23

## 2020-04-22 RX ADMIN — FENTANYL CITRATE 50 MCG: 50 INJECTION INTRAMUSCULAR; INTRAVENOUS at 12:47

## 2020-04-22 RX ADMIN — DEXAMETHASONE SODIUM PHOSPHATE 4 MG: 10 INJECTION INTRAMUSCULAR; INTRAVENOUS at 07:13

## 2020-04-22 RX ADMIN — METOCLOPRAMIDE 10 MG: 5 INJECTION, SOLUTION INTRAMUSCULAR; INTRAVENOUS at 17:08

## 2020-04-22 RX ADMIN — PHENYLEPHRINE HYDROCHLORIDE 200 MCG: 10 INJECTION INTRAVENOUS at 07:36

## 2020-04-22 RX ADMIN — PROPOFOL 200 MG: 10 INJECTION, EMULSION INTRAVENOUS at 07:12

## 2020-04-22 RX ADMIN — LIDOCAINE HYDROCHLORIDE 50 MG: 10 INJECTION, SOLUTION INFILTRATION; PERINEURAL at 07:12

## 2020-04-22 RX ADMIN — HYDROMORPHONE HYDROCHLORIDE 0.5 MG: 1 INJECTION, SOLUTION INTRAMUSCULAR; INTRAVENOUS; SUBCUTANEOUS at 04:33

## 2020-04-22 RX ADMIN — IPRATROPIUM BROMIDE AND ALBUTEROL SULFATE 3 ML: 2.5; .5 SOLUTION RESPIRATORY (INHALATION) at 06:52

## 2020-04-22 RX ADMIN — ATORVASTATIN CALCIUM 10 MG: 10 TABLET, FILM COATED ORAL at 20:03

## 2020-04-22 RX ADMIN — FENTANYL CITRATE 25 MCG: 50 INJECTION, SOLUTION INTRAMUSCULAR; INTRAVENOUS at 09:18

## 2020-04-22 RX ADMIN — DOCUSATE SODIUM 100 MG: 100 CAPSULE ORAL at 20:02

## 2020-04-22 RX ADMIN — NEOSTIGMINE 2.5 MG: 1 INJECTION INTRAVENOUS at 09:16

## 2020-04-22 RX ADMIN — HYDROMORPHONE HYDROCHLORIDE 0.5 MG: 1 INJECTION, SOLUTION INTRAMUSCULAR; INTRAVENOUS; SUBCUTANEOUS at 22:04

## 2020-04-22 RX ADMIN — HYDROMORPHONE HYDROCHLORIDE: 10 INJECTION INTRAMUSCULAR; INTRAVENOUS; SUBCUTANEOUS at 10:41

## 2020-04-22 RX ADMIN — ROCURONIUM BROMIDE 10 MG: 10 INJECTION INTRAVENOUS at 07:46

## 2020-04-22 RX ADMIN — ROCURONIUM BROMIDE 47 MG: 10 INJECTION INTRAVENOUS at 07:23

## 2020-04-22 RX ADMIN — METOCLOPRAMIDE 10 MG: 5 INJECTION, SOLUTION INTRAMUSCULAR; INTRAVENOUS at 21:54

## 2020-04-22 RX ADMIN — PHENYLEPHRINE HYDROCHLORIDE 100 MCG: 10 INJECTION INTRAVENOUS at 07:16

## 2020-04-22 RX ADMIN — GLYCOPYRROLATE 0.4 MG: 0.2 INJECTION INTRAMUSCULAR; INTRAVENOUS at 09:16

## 2020-04-22 RX ADMIN — SODIUM CHLORIDE, POTASSIUM CHLORIDE, SODIUM LACTATE AND CALCIUM CHLORIDE 150 ML/HR: 600; 310; 30; 20 INJECTION, SOLUTION INTRAVENOUS at 23:43

## 2020-04-22 RX ADMIN — SUCCINYLCHOLINE CHLORIDE 140 MG: 20 INJECTION, SOLUTION INTRAMUSCULAR; INTRAVENOUS; PARENTERAL at 07:12

## 2020-04-22 RX ADMIN — ROCURONIUM BROMIDE 10 MG: 10 INJECTION INTRAVENOUS at 08:10

## 2020-04-22 RX ADMIN — PANTOPRAZOLE SODIUM 40 MG: 40 TABLET, DELAYED RELEASE ORAL at 06:00

## 2020-04-22 RX ADMIN — FENTANYL CITRATE 50 MCG: 50 INJECTION INTRAMUSCULAR; INTRAVENOUS at 11:30

## 2020-04-22 RX ADMIN — ONDANSETRON 4 MG: 2 INJECTION INTRAMUSCULAR; INTRAVENOUS at 08:53

## 2020-04-22 RX ADMIN — SODIUM CHLORIDE, POTASSIUM CHLORIDE, SODIUM LACTATE AND CALCIUM CHLORIDE 9 ML/HR: 600; 310; 30; 20 INJECTION, SOLUTION INTRAVENOUS at 06:28

## 2020-04-22 RX ADMIN — SODIUM CHLORIDE, POTASSIUM CHLORIDE, SODIUM LACTATE AND CALCIUM CHLORIDE 100 ML/HR: 600; 310; 30; 20 INJECTION, SOLUTION INTRAVENOUS at 00:46

## 2020-04-22 RX ADMIN — CARVEDILOL 12.5 MG: 12.5 TABLET, FILM COATED ORAL at 17:07

## 2020-04-22 RX ADMIN — HYDROCODONE BITARTRATE AND ACETAMINOPHEN 1 TABLET: 10; 325 TABLET ORAL at 17:22

## 2020-04-22 RX ADMIN — EPHEDRINE SULFATE 15 MG: 50 INJECTION INTRAMUSCULAR; INTRAVENOUS; SUBCUTANEOUS at 08:45

## 2020-04-22 RX ADMIN — HYDROMORPHONE HYDROCHLORIDE 0.5 MG: 1 INJECTION, SOLUTION INTRAMUSCULAR; INTRAVENOUS; SUBCUTANEOUS at 02:48

## 2020-04-22 RX ADMIN — IPRATROPIUM BROMIDE AND ALBUTEROL SULFATE 3 ML: 2.5; .5 SOLUTION RESPIRATORY (INHALATION) at 01:10

## 2020-04-22 RX ADMIN — HYDRALAZINE HYDROCHLORIDE 50 MG: 50 TABLET, FILM COATED ORAL at 17:08

## 2020-04-22 RX ADMIN — FENTANYL CITRATE 25 MCG: 50 INJECTION, SOLUTION INTRAMUSCULAR; INTRAVENOUS at 09:06

## 2020-04-22 RX ADMIN — FENTANYL CITRATE 50 MCG: 50 INJECTION, SOLUTION INTRAMUSCULAR; INTRAVENOUS at 08:29

## 2020-04-22 RX ADMIN — TAZOBACTAM SODIUM AND PIPERACILLIN SODIUM 4.5 G: 500; 4 INJECTION, SOLUTION INTRAVENOUS at 07:07

## 2020-04-22 RX ADMIN — METRONIDAZOLE 500 MG: 500 INJECTION, SOLUTION INTRAVENOUS at 17:07

## 2020-04-22 RX ADMIN — SODIUM CHLORIDE, PRESERVATIVE FREE 10 ML: 5 INJECTION INTRAVENOUS at 20:04

## 2020-04-22 RX ADMIN — DEXAMETHASONE SODIUM PHOSPHATE 6 MG: 10 INJECTION INTRAMUSCULAR; INTRAVENOUS at 07:20

## 2020-04-22 RX ADMIN — TAZOBACTAM SODIUM AND PIPERACILLIN SODIUM 4.5 G: 500; 4 INJECTION, SOLUTION INTRAVENOUS at 17:06

## 2020-04-22 RX ADMIN — HYDRALAZINE HYDROCHLORIDE 50 MG: 50 TABLET, FILM COATED ORAL at 20:02

## 2020-04-22 RX ADMIN — ROCURONIUM BROMIDE 10 MG: 10 INJECTION INTRAVENOUS at 08:43

## 2020-04-22 RX ADMIN — TAZOBACTAM SODIUM AND PIPERACILLIN SODIUM 4.5 G: 500; 4 INJECTION, SOLUTION INTRAVENOUS at 21:54

## 2020-04-22 RX ADMIN — ROCURONIUM BROMIDE 3 MG: 10 INJECTION INTRAVENOUS at 07:11

## 2020-04-22 RX ADMIN — HYDROMORPHONE HYDROCHLORIDE 0.5 MG: 1 INJECTION, SOLUTION INTRAMUSCULAR; INTRAVENOUS; SUBCUTANEOUS at 00:41

## 2020-04-22 RX ADMIN — BUPIVACAINE HYDROCHLORIDE 60 ML: 2.5 INJECTION, SOLUTION EPIDURAL; INFILTRATION; INTRACAUDAL; PERINEURAL at 07:13

## 2020-04-22 NOTE — PROGRESS NOTES
"Patient Name:  Damion Llanos  YOB: 1955  [id number]    Surgery Post - Operative Note    Date of visit: 4/22/2020    Subjective   Subjective: Feels sore, but stable. Pain controlled.       Objective     Objective:    /75 (BP Location: Right arm, Patient Position: Lying)   Pulse 94   Temp 99.8 °F (37.7 °C) (Axillary)   Resp 20   Ht 172.7 cm (68\")   Wt 108 kg (237 lb)   SpO2 95%   BMI 36.04 kg/m²     CV:  Rate  regular and rhythm  regular  L:  Mild rhonchi to auscultation bilaterally   ABD:  Soft, appropriately tender. Dressings  clean, dry and intact , ostomy pink, viable, DAISY serosanguinous, scant  EXT:  No cyanosis, clubbing or edema         Assessment/Plan     Assessment/ Plan: Doing well after R colectomy. Continue Pulmonary toilet    Hospital Problem List     * (Principal) Perforated bowel (CMS/HCC)    Tobacco abuse    Primary osteoarthritis of both hips    Type 2 diabetes mellitus with complication, without long-term current use of insulin (CMS/HCC)        Dependent edema    Chronic pain    Chronic alcohol abuse        Hypoxia              El Nugent MD  4/22/2020  16:31    "

## 2020-04-22 NOTE — PROGRESS NOTES
"Patient Name:  Damion Llanos  YOB: 1955  [id number]    Surgery Progress Note    Date of visit: 4/22/2020    Subjective   Subjective: Patient with worsening pain overnight. Repeat CT scan obtained         Objective     Objective:     /80 (BP Location: Right arm, Patient Position: Lying)   Pulse 80   Temp 98.7 °F (37.1 °C) (Temporal)   Resp 18   Ht 172.7 cm (68\")   Wt 108 kg (237 lb)   SpO2 96%   BMI 36.04 kg/m²     Intake/Output Summary (Last 24 hours) at 4/22/2020 0703  Last data filed at 4/22/2020 0038  Gross per 24 hour   Intake 1000 ml   Output 1200 ml   Net -200 ml       CV:  Rhythm  regular and rate regular   L:  Mild rhonchi to auscultation bilaterally   Abd:  Bowel sounds hypoactive, soft, more tender with peritoneal signs  Ext:  No cyanosis, clubbing, edema    Recent labs that are back at this time have been reviewed.  CT scan of the abdomen and pelvis was personally viewed by me as well as a final dictated and edited report.  This now shows the development of a significant amount of free intraperitoneal air.  Interestingly, there is a minimal amount of free fluid.  The source of the perforation is not readily apparent, though I suspect either a perforated gastric ulcer, or perforated diverticulitis.  In either event evidence of worsening intra-abdominal catastrophe.       Assessment/Plan     Assessment/ Plan:    Hospital Problem List     * (Principal) Abdominal pain - We we will proceed emergently to the operating room for laparotomy.  I discussed the risk and benefits at length with the patient including the possibility of ostomy, and he is agreeable to proceed.    Tobacco abuse    Primary osteoarthritis of both hips    Type 2 diabetes mellitus with complication, without long-term current use of insulin (CMS/[facility])        Dependent edema    Chronic pain    Chronic alcohol abuse        Hypoxia              El Nugent MD  4/22/2020  07:03      "

## 2020-04-22 NOTE — ANESTHESIA PREPROCEDURE EVALUATION
Anesthesia Evaluation     Patient summary reviewed and Nursing notes reviewed                Airway   Mallampati: II  TM distance: >3 FB  Neck ROM: full  No difficulty expected  Dental - normal exam     Pulmonary    (+) a smoker Current, COPD, rhonchi, decreased breath sounds,   Cardiovascular - normal exam    (+) hypertension, hyperlipidemia,     ROS comment: Echo 12/17: normal EF, no significant valve disease    Neuro/Psych- negative ROS  GI/Hepatic/Renal/Endo    (+) morbid obesity, GERD,  diabetes mellitus,     Musculoskeletal     Abdominal  - normal exam    Bowel sounds: normal.   Substance History   (+) alcohol use (h/o EtOH abuse),      OB/GYN negative ob/gyn ROS         Other   arthritis,                    Anesthesia Plan    ASA 3 - emergent     general   Rapid sequence(TAP blocks)  intravenous induction     Anesthetic plan, all risks, benefits, and alternatives have been provided, discussed and informed consent has been obtained with: patient.    Plan discussed with CRNA.

## 2020-04-22 NOTE — ANESTHESIA POSTPROCEDURE EVALUATION
Patient: Damion Llanos    Procedure Summary     Date:  04/22/20 Room / Location:   [facility] /  [facility]    Anesthesia Start:  0707 Anesthesia Stop:  0935    Procedure:  LAPAROTOMY EXPLORATORY, RIGHT HEMICOLECTOMY, ILEOSTOMY, MUCUS FISTULA (N/A Abdomen) Diagnosis:      Surgeon:  El Nugent MD Provider:  Scott Pinedo MD    Anesthesia Type:  general ASA Status:  3 - Emergent          Anesthesia Type: general    Vitals  Vitals Value Taken Time   /68 4/22/2020  9:31 AM   Temp     Pulse 88 4/22/2020  9:34 AM   Resp     SpO2 91 % 4/22/2020  9:34 AM   Vitals shown include unvalidated device data.        Post Anesthesia Care and Evaluation    Patient location during evaluation: PACU  Patient participation: complete - patient participated  Level of consciousness: awake and alert and responsive to verbal stimuli  Pain score: 3  Pain management: adequate  Airway patency: patent  Anesthetic complications: No anesthetic complications  PONV Status: none  Cardiovascular status: hemodynamically stable and acceptable  Respiratory status: nonlabored ventilation, acceptable and nasal cannula  Hydration status: acceptable

## 2020-04-22 NOTE — PAYOR COMM NOTE
"Shelley Ellison RN  Utilization Review  P: [phone]  F: [phone]    Member ID = [id number]  Initial notification & clinicals for inpatient auth  Underwent Exploratory laparotomy & Right hemicolectomy with end ileostomy and mucous fistula on 2020    Chris Llanos (64 y.o. Male)     Date of Birth Social Security Number Address Home Phone MRN    1955  [address] [id number] [phone] [id number]    Yarsani Marital Status          None        Admission Date Admission Type Admitting Provider Attending Provider Department, Room/Bed    20 Emergency Cornelius Xavier DO Shields, Daniel Alan, DO [facility], [facility] OR/NONE    Discharge Date Discharge Disposition Discharge Destination                       Attending Provider:  Cornelius Xavier DO    Allergies:  No Known Allergies    Isolation:  None   Infection:  None   Code Status:  CPR    Ht:  172.7 cm (68\")   Wt:  108 kg (237 lb)    Admission Cmt:  None   Principal Problem:  Perforated bowel (CMS/HCC) [K63.1]                 Active Insurance as of 2020     Primary Coverage     Payor Plan Insurance Group Employer/Plan Group    [employer] [place] [employer] [place]      Payor Plan Address Payor Plan Phone Number Payor Plan Fax Number Effective Dates    [address] [address] [phone]  2016 - None Entered    [facility] [id number]       Subscriber Name Subscriber Birth Date Member ID       CHRIS LLANOS 1955 [id number]                 Emergency Contacts      (Rel.) Home Phone Work Phone Mobile Phone    chris Llanos (Son) [phone] -- --               History & Physical      Cornelius Xavier DO at [facility]              [facility] Medicine Services  HISTORY AND PHYSICAL    Patient Name: Chris Llanos  : 1955  MRN: [id number]  Primary Care Physician: Konstantin Palm MD  Date of admission: 2020      Subjective   Subjective     Chief " "Complaint:  Abdominal pain    HPI:  Damion Llanos is a 64 y.o. male with DM, chronic pain from arthritis on opiates, gout, who presents with acute onset abdominal pain. He was in his usual state of health yesterday, had a large meal of \"pork beans\" and went to bed. Around 02:00-03:00 this morning he was awoken by low mid abdominal pain that progressively spread throughout the abdomen with developing distention of the abdomen. His pain feels \"like a constant push,\" without provocative features, was improved with pain meds in the ED and briefly after BM this AM which was normal. Has self attempted to make himself vomit without success. In the ED he has been requiring 2lpm NC, he cannot take a deep breath due to abdominal distention and pain. Drinks 6-7 beers per night for many years, denies Hx of withdrawal problems.    Review of Systems   Gen- No fevers, chills  CV- No chest pain, palpitations  Resp- No cough, dyspnea  GI- No N/V/D, yes abd pain    All other systems reviewed and are negative.     Personal History     Past Medical History:   Diagnosis Date   • Anemia    • Arthritis    • Diabetes mellitus (CMS/HCC)    • GERD (gastroesophageal reflux disease)    • Gout    • Hyperlipidemia    • Hypertension        Past Surgical History:   Procedure Laterality Date   • COLONOSCOPY     • ENDOSCOPY N/A 12/17/2017    Procedure: ESOPHAGOGASTRODUODENOSCOPY;  Surgeon: Mark I Brunner, MD;  Location: [facility] ENDOSCOPY;  Service:    • HERNIA REPAIR         Family History: Family history is unknown by patient. Otherwise pertinent FHx was reviewed and unremarkable.     Social History:  reports that he has been smoking cigarettes. He has been smoking about 1.00 pack per day. He has never used smokeless tobacco. He reports that he drinks about 49.0 standard drinks of alcohol per week. He reports that he does not use drugs.  Social History     Social History Narrative   • Not on file       Medications:  Available home medication " information reviewed.  Medications Prior to Admission   Medication Sig Dispense Refill Last Dose   • albuterol (PROVENTIL HFA;VENTOLIN HFA) 108 (90 Base) MCG/ACT inhaler Inhale 2 puffs Every 4 (Four) Hours As Needed for Wheezing.      • allopurinol (ZYLOPRIM) 300 MG tablet Take 300 mg by mouth Daily.      • carvedilol (COREG) 12.5 MG tablet Take 12.5 mg by mouth 2 (Two) Times a Day With Meals.   12/16/2017   • ferrous sulfate 325 (65 FE) MG tablet Take 325 mg by mouth Every Other Day.      • furosemide (LASIX) 40 MG tablet Take 40 mg by mouth 2 (Two) Times a Day.   12/16/2017   • gabapentin (NEURONTIN) 300 MG capsule Take 300 mg by mouth 3 (Three) Times a Day.      • hydrALAZINE (APRESOLINE) 50 MG tablet Take 50 mg by mouth 3 (Three) Times a Day.   12/16/2017   • HYDROcodone-acetaminophen (NORCO)  MG per tablet Take 1 tablet by mouth Every 8 (Eight) Hours As Needed for Moderate Pain .   12/16/2017   • lisinopril (PRINIVIL,ZESTRIL) 40 MG tablet Take 20 mg by mouth Daily.   12/16/2017   • metFORMIN (GLUCOPHAGE) 500 MG tablet Take 500 mg by mouth 2 (Two) Times a Day With Meals.   12/16/2017   • pantoprazole (PROTONIX) 40 MG EC tablet Take 40 mg by mouth Daily.      • potassium chloride (K-DUR,KLOR-CON) 10 MEQ CR tablet Take 10 mEq by mouth 2 (Two) Times a Day As Needed.      • simvastatin (ZOCOR) 20 MG tablet Take 20 mg by mouth Every Night.   12/15/2017       No Known Allergies    Objective   Objective     Vital Signs:   Temp:  [98.6 °F (37 °C)] 98.6 °F (37 °C)  Heart Rate:  [83-88] 88  Resp:  [20-24] 24  BP: (125-181)/() 170/96        Physical Exam   Constitutional: Awake, alert, NAD, appears uncomfortable  Eyes: PERRL, sclerae anicteric, no conjunctival injection  HENT: NCAT, mucous membranes moist  Neck: Supple, trachea midline  Respiratory: Clear to auscultation bilaterally, slight tachypnea with shallow breaths, winces when asked to take deep breath  Cardiovascular: RRR, no murmurs, rubs, or gallops,  palpable pedal pulses bilaterally  Gastrointestinal: Abdomen firm, distended, tympanic to percussion, BS noted but infrequent, slight guarding  Musculoskeletal: Mild BL LE edema, no clubbing or cyanosis to extremities  Psychiatric: Appropriate affect, cooperative  Neurologic: Oriented x 3, speech clear    Results Reviewed:  I have personally reviewed current lab and radiology data.    Results from last 7 days   Lab Units 04/21/20  0939   WBC 10*3/mm3 13.77*   HEMOGLOBIN g/dL 13.0   HEMATOCRIT % 40.0   PLATELETS 10*3/mm3 261     Results from last 7 days   Lab Units 04/21/20  0939   SODIUM mmol/L 131*   POTASSIUM mmol/L 4.7   CHLORIDE mmol/L 94*   CO2 mmol/L 25.0   BUN mg/dL 18   CREATININE mg/dL 0.75*   GLUCOSE mg/dL 123*   CALCIUM mg/dL 9.4   ALT (SGPT) U/L 18   AST (SGOT) U/L 20   LACTATE mmol/L 1.2     Estimated Creatinine Clearance: 108.2 mL/min (A) (by C-G formula based on SCr of 0.75 mg/dL (L)).  Brief Urine Lab Results  (Last result in the past 365 days)      Color   Clarity   Blood   Leuk Est   Nitrite   Protein   CREAT   Urine HCG        04/21/20 1243 Yellow Clear Negative Negative Negative Negative             Imaging Results (Last 24 Hours)     Procedure Component Value Units Date/Time    CT Abdomen Pelvis With Contrast [[id number]] Collected:  04/21/20 1048     Updated:  04/21/20 1305    Narrative:       EXAMINATION: CT ABDOMEN/PELVIS WITH CONTRAST-04/21/2020:      INDICATION: Ventral hernia, RLQ tender, right lower quadrant tenderness.     TECHNIQUE: Multiple axial CT imaging was obtained of the abdomen and  pelvis following the administration of intravenous contrast.     The radiation dose reduction device was turned on for each scan per the  ALARA (As Low as Reasonably Achievable) protocol.     COMPARISON: NONE.     FINDINGS:      ABDOMEN: The lung bases are grossly clear with no features of pneumonia.  The liver is homogeneous in appearance. No stones seen in the  gallbladder. The spleen is homogeneous  in appearance. The kidneys and  adrenal glands are unremarkable. Vascular calcification seen within the  abdominal aorta and iliac vessels. The pancreas is homogeneous in  appearance. There is some free fluid identified along the left flank and  left paracolic gutter. The appearance of the bowel is grossly  unremarkable in appearance. There is some fluid-filled loops of small  bowel identified in which a mild enteritis cannot be completely  excluded. Etiology of the fluid is uncertain. There is no abdominal or  retroperitoneal adenopathy. Small cyst identified along the subcutaneous  tissues in the right lower back. Tiny periumbilical hernia containing  minimal fat. No evidence of strangulation.     PELVIS: The pelvic organs are unremarkable. The pelvic portions of the  gastrointestinal tract are within normal limits. No free fluid or free  air. No abnormal mass or fluid collection is identified. The appendix is  radiographically normal in appearance. No pelvic adenopathy. No abnormal  mass or fluid collection is identified. The bony structures reveal no  evidence of osseous abnormality. Degenerative changes seen within the  spine and pelvis.       Impression:       Mild fluid-filled loops of small bowel in which a mild  enteritis cannot be excluded. No significant wall thickening. Minimal  free fluid seen along the left paracolic gutter. No other CT evidence of  acute intra-abdominal or pelvic abnormality. The appendix is normal. The  lung bases are clear with no features of pneumonia.     D:  04/21/2020  E:  04/21/2020     This report was finalized on 4/21/2020 1:02 PM by Dr. Clair Pedro MD.           Results for orders placed during the hospital encounter of 12/16/17   Adult Transthoracic Echo Complete W/ Cont if Necessary Per Protocol    Narrative · Left ventricular systolic function is normal. Estimated EF = 65%.  · The cardiac valves are anatomically and functionally normal.          Assessment/Plan      Assessment & Plan     Active Hospital Problems    Diagnosis POA   • **Abdominal pain [R10.9] Yes   • Chronic pain [G89.29] Yes   • Dependent edema [R60.9] Yes   • Tobacco abuse [Z72.0] Yes   • Primary osteoarthritis of both hips [M16.0] Yes   • Type 2 diabetes mellitus with complication, without long-term current use of insulin (CMS/[facility]) [E11.8] Yes     Summary: This is a 63 y/o male with Hx DM2, HTN, gout, chronic pain with opiates, who presents with acute onset low abdominal pain with gaseous distention and CT evidence for mild enteritis    Assessment/Plan    Intractable abdominal pain  Mild enteritis  - abdominal labs unremarkable  - supportive care, IVF's today and pain control  - had a normal BM this AM  - based on exam suspect component of gaseous distention with pain, will add simethicone, as well as bowel regimen (inc relistor) to attempt to decompress his bowels  - seen by Dr. Nugent  - low threshold to re-CT abdomen if acute change in status, could be developing ileus or obstruction; clear liquids today    Acute hypoxic respiratory insufficieny due to hypoventilation  Chronic tobacco abuse  - discrepancy in the chart regarding Hx COPD or not  - PRN nebs  - NRT  - currently unable to take deep breaths due to abdominal distention, if his O2 reqs don't improve with improvement in GI syx will pursue CXR  - denies new cough, sputum production, fevers, sick contacts    Mild hypovolemic hyponatremia vs beer potomania  - IVF's today and recheck BMP in the AM    Chronic EtOH use with dependence  - drinks 6-7 beers/night for years  - denies hx of withdrawal, though noncommittal regarding if he has quit for 3 or more days in recent years  - CIWA scoring and PRN ativan; if he develops symptoms would start oral valium scheduled    DM type 2, self reported recent a1c <7%  - on metformin only; accuchecks with SSI    Chronic venous stasis  - on lasix outpatient for edema, recently started using compression stockings  which have helped  - holding diuretics while volume resuscitating    DVT prophylaxis:  mechanical    CODE STATUS:    Code Status and Medical Interventions:   Ordered at: 04/21/20 1443     Code Status:    CPR     Medical Interventions (Level of Support Prior to Arrest):    Full     Comments:    Prior       Admission Status:  I believe this patient meets OBSERVATION status, however if further evaluation or treatment plans warrant, status may change.  Based upon current information, I predict patient's care encounter to be less than or equal to 2 midnights.    Electronically signed by Cornelius Xavier DO, 04/21/20, 2:46 PM.      Electronically signed by Cornelius Xavier DO at 04/21/20 1506       Vital Signs (last 2 days)     Date/Time   Temp   Temp src   Pulse   Resp   BP   Patient Position   SpO2    04/22/20 1015   99.8 (37.7)   --   83   24   127/68   --   91    04/22/20 1009   --   --   79   24   --   Lying   91    04/22/20 1000   98.9 (37.2)   --   84   24   122/68   --   90    04/22/20 0945   98.2 (36.8)   --   85   24   112/59   --   91    04/22/20 0940   --   --   87   24   113/62   --   91    04/22/20 0935   --   --   88   24   119/60   --   90    04/22/20 0930   100.3 (37.9)   Temporal   88   24   124/68   Lying   91    04/22/20 0657   --   --   80   18   --   Lying   96    04/22/20 0630   98.7 (37.1)   Temporal   79   18   147/80   Lying   94    04/22/20 0359   99.1 (37.3)   Oral   78   18   117/69   Sitting   90    04/22/20 0110   --   --   81   20   --   --   91    04/22/20 0049   --   --   80   22   141/85   Sitting   --    04/21/20 2328   98.9 (37.2)   Oral   74   20   123/71   Sitting   --    04/21/20 2113   --   --   --   --   115/68   --   --    04/21/20 1935   98.8 (37.1)   Oral   88   18   139/88   Lying   --    04/21/20 1833   --   --   97   --   143/83   --   --    04/21/20 1710   --   --   99   --   --   --   --    04/21/20 1520   98.4 (36.9)   Oral   84   22   175/84   Lying   --     04/21/20 1407   98.6 (37)   Oral   88   24   170/96   Lying   91    04/21/20 12:50:18   --   --   85   --   --   --   91    04/21/20 1200   --   --   --   --   158/100   --   92    04/21/20 1130   --   --   88   --   153/68   --   91    04/21/20 1116   --   --   86   --   155/75   --   91    04/21/20 1100   --   --   85   --   155/75   --   93    04/21/20 10:21:12   --   --   85   --   --   --   90 HOLDING HIS BREATH BECAUSE HE STATES IT HURTS TO TAKE DEEP BREATHS    SpO2: HOLDING HIS BREATH BECAUSE HE STATES IT HURTS TO TAKE DEEP BREATHS at 04/21/20 1021    04/21/20 1000   --   --   --   --   125/66   --   (!) 88 STARTED ON 2LNC    SpO2: STARTED ON 2LNC at 04/21/20 1000    04/21/20 0923   --   --   --   --   (!) 181/91   Lying   --    04/21/20 0918   98.6 (37)   Oral   83   20   --   --   92              Facility-Administered Medications as of 4/22/2020   Medication Dose Route Frequency Provider Last Rate Last Dose   • [MAR Hold] acetaminophen (TYLENOL) tablet 650 mg  650 mg Oral Q4H PRN Cornelius Xavier,         Or   • [MAR Hold] acetaminophen (TYLENOL) 160 MG/5ML solution 650 mg  650 mg Oral Q4H PRN Cornelius Xavier DO        Or   • [MAR Hold] acetaminophen (TYLENOL) suppository 650 mg  650 mg Rectal Q4H PRN Cornelius Xavier DO       • [MAR Hold] allopurinol (ZYLOPRIM) tablet 300 mg  300 mg Oral Daily Cornelius Xavier DO   300 mg at 04/21/20 1605   • [MAR Hold] atorvastatin (LIPITOR) tablet 10 mg  10 mg Oral Nightly Cornelius Xavier DO   10 mg at 04/21/20 2113   • [MAR Hold] bisacodyl (DULCOLAX) EC tablet 10 mg  10 mg Oral Daily El Nugent MD   10 mg at 04/21/20 1542   • [MAR Hold] bisacodyl (DULCOLAX) suppository 10 mg  10 mg Rectal Daily PRN Cornelius Xavier DO       • carvedilol (COREG) tablet 12.5 mg  12.5 mg Oral BID With Meals Cornelius Xavier DO   12.5 mg at 04/21/20 1833   • dexamethasone (DECADRON) injection 8 mg  8 mg Intravenous Once PRN Barry Lubin,  CRNA       • [MAR Hold] dextrose (D50W) 25 g/ 50mL Intravenous Solution 25 g  25 g Intravenous Q15 Min PRN Cornelius Xavier DO       • [MAR Hold] dextrose (GLUTOSE) oral gel 15 g  15 g Oral Q15 Min PRN Cornelius Xavier DO       • diphenhydrAMINE (BENADRYL) injection 25 mg  25 mg Intravenous Q6H PRN El Nugent MD       • [MAR Hold] docusate sodium (COLACE) capsule 100 mg  100 mg Oral BID El Nugnet MD   100 mg at 04/21/20 1543   • fentaNYL citrate (PF) (SUBLIMAZE) injection 50 mcg  50 mcg Intravenous Q5 Min PRN Barry Lubin CRNA       • [MAR Hold] ferrous sulfate tablet 325 mg  325 mg Oral Every Other Day Cornelius Xavier DO   325 mg at 04/21/20 1557   • gabapentin (NEURONTIN) capsule 300 mg  300 mg Oral TID PRN Cornelius Xavier DO       • [MAR Hold] glucagon (human recombinant) (GLUCAGEN DIAGNOSTIC) injection 1 mg  1 mg Subcutaneous Q15 Min PRN Cornelius Xavier DO       • hydrALAZINE (APRESOLINE) tablet 50 mg  50 mg Oral TID Cornelius Xavier DO   50 mg at 04/21/20 1542   • [MAR Hold] HYDROcodone-acetaminophen (NORCO)  MG per tablet 1 tablet  1 tablet Oral Q8H PRN Cornelius Xavier DO   1 tablet at 04/21/20 2113   • [COMPLETED] HYDROmorphone (DILAUDID) injection 0.5 mg  0.5 mg Intravenous Once Scott Castro MD   0.5 mg at 04/21/20 1059   • [COMPLETED] HYDROmorphone (DILAUDID) injection 0.5 mg  0.5 mg Intravenous Once Scott Castro MD   0.5 mg at 04/21/20 1244   • [MAR Hold] HYDROmorphone (DILAUDID) injection 0.5 mg  0.5 mg Intravenous Q2H PRN Cornelius Xavier DO   0.5 mg at 04/22/20 0248   • [COMPLETED] HYDROmorphone (DILAUDID) injection 0.5 mg  0.5 mg Intravenous Once Allison Dsouza APRN   0.5 mg at 04/22/20 0433   • HYDROmorphone (DILAUDID) injection 0.5 mg  0.5 mg Intravenous Q5 Min PRN Barry Lubin, RYDER       • HYDROmorphone (DILAUDID) PCA 1 mg/mL syringe   Intravenous Continuous El Nugent MD       • [MAR Hold]  insulin lispro (humaLOG) injection 0-7 Units  0-7 Units Subcutaneous TID With Meals Cornelius Xavier DO       • [COMPLETED] iopamidol (ISOVUE-300) 61 % injection 100 mL  100 mL Intravenous Once in imaging MatthewScott MD   95 mL at 04/21/20 1040   • [MAR Hold] ipratropium-albuterol (DUO-NEB) nebulizer solution 3 mL  3 mL Nebulization Q6H PRN Cornelius Xavier DO   3 mL at 04/22/20 0110   • [COMPLETED] ipratropium-albuterol (DUO-NEB) nebulizer solution 3 mL  3 mL Nebulization Once El Nugent MD   3 mL at 04/22/20 1005   • lactated ringers bolus 500 mL  500 mL Intravenous Once PRN Barry Lubin CRNA       • lactated ringers infusion  9 mL/hr Intravenous Continuous Scott Pinedo MD 9 mL/hr at 04/22/20 0628 9 mL/hr at 04/22/20 0628   • lactated ringers infusion  150 mL/hr Intravenous Continuous El Nugent MD       • lisinopril (PRINIVIL,ZESTRIL) tablet 20 mg  20 mg Oral Daily Cornelius Xavier DO   20 mg at 04/21/20 1542   • [MAR Hold] LORazepam (ATIVAN) tablet 0.5 mg  0.5 mg Oral Q2H PRN Cornelius Xavier DO        Or   • [MAR Hold] LORazepam (ATIVAN) injection 0.5 mg  0.5 mg Intravenous Q2H PRN Cornelius Xavier DO        Or   • [MAR Hold] LORazepam (ATIVAN) tablet 1 mg  1 mg Oral Q1H PRN Cornelius Xavier DO        Or   • [MAR Hold] LORazepam (ATIVAN) injection 1 mg  1 mg Intravenous Q1H PRN Cornelius Xavier DO        Or   • [MAR Hold] LORazepam (ATIVAN) injection 1 mg  1 mg Intravenous Q15 Min PRN Cornelius Xavier DO        Or   • [MAR Hold] LORazepam (ATIVAN) injection 1 mg  1 mg Intramuscular Q15 Min PRN Cornelius Xavier DO       • [MAR Hold] methylnaltrexone (RELISTOR) injection 12 mg  12 mg Subcutaneous Every Other Day Cornelius Xavier,    12 mg at 04/21/20 1606   • methylnaltrexone (RELISTOR) injection 4 mg  4 mg Subcutaneous Every Other Day El Nugent MD       • metoclopramide (REGLAN) injection 10 mg  10 mg Intravenous Q6H  El Nugent MD       • metroNIDAZOLE (FLAGYL) 500 mg/100mL IVPB  500 mg Intravenous Q8H El Nugent MD       • [COMPLETED] Morphine sulfate (PF) injection 4 mg  4 mg Intravenous Once Scott Castro MD   4 mg at 04/21/20 0945   • naloxone (NARCAN) injection 0.1 mg  0.1 mg Intravenous Q5 Min PRN El Nugent MD       • [MAR Hold] nicotine (NICODERM CQ) 21 MG/24HR patch 1 patch  1 patch Transdermal Daily PRN Cornelius Xavier DO       • [COMPLETED] ondansetron (ZOFRAN) injection 4 mg  4 mg Intravenous Once Brittani Giraldo PA-C   4 mg at 04/21/20 0945   • [MAR Hold] ondansetron (ZOFRAN) tablet 4 mg  4 mg Oral Q6H PRN Cornelius Xavier DO        Or   • [MAR Hold] ondansetron (ZOFRAN) injection 4 mg  4 mg Intravenous Q6H PRN Cornelius Xavier DO       • ondansetron (ZOFRAN) injection 4 mg  4 mg Intravenous Once PRN Barry Lubin CRNA       • [MAR Hold] pantoprazole (PROTONIX) EC tablet 40 mg  40 mg Oral Q AM Cornelius Xaiver DO   40 mg at 04/22/20 0600   • [COMPLETED] piperacillin-tazobactam (ZOSYN) 4.5 g in iso-osmotic dextrose 100 mL IVPB (premix)  4.5 g Intravenous Once El Nugent MD   4.5 g at 04/22/20 0707   • piperacillin-tazobactam (ZOSYN) 4.5 g in iso-osmotic dextrose 100 mL IVPB (premix)  4.5 g Intravenous Q8H El Nugent MD       • promethazine (PHENERGAN) injection 25 mg  25 mg Intravenous Q6H PRN El Nugent MD       • [MAR Hold] sennosides-docusate (PERICOLACE) 8.6-50 MG per tablet 2 tablet  2 tablet Oral BID PRN Cornelius Xavier DO       • [MAR Hold] simethicone (MYLICON) chewable tablet 80 mg  80 mg Oral 4x Daily PRN Cornelius Xavier DO   80 mg at 04/21/20 2136   • [COMPLETED] sodium chloride 0.9 % bolus 1,000 mL  1,000 mL Intravenous Once Brittani Giraldo PA-C   Stopped at 04/21/20 1055   • [MAR Hold] sodium chloride 0.9 % flush 10 mL  10 mL Intravenous PRN Scott Castro MD       • [MAR Hold] sodium chloride 0.9 %  flush 10 mL  10 mL Intravenous Q12H Cornelius Xavier DO       • [MAR Hold] sodium chloride 0.9 % flush 10 mL  10 mL Intravenous PRN Cornelius Xavier DO         Lab Results (all)     Procedure Component Value Units Date/Time    POC Glucose Once [[id number]]  (Abnormal) Collected:  04/22/20 0950    Specimen:  Blood Updated:  04/22/20 0959     Glucose 148 mg/dL     Tissue Pathology Exam [[id number]] Collected:  04/22/20 0833    Specimen:  Tissue from Large Intestine, Right / Ascending Colon Updated:  04/22/20 0949    Lactic Acid, Plasma [[id number]]  (Normal) Collected:  04/22/20 0559    Specimen:  Blood Updated:  04/22/20 0719     Lactate 0.9 mmol/L      Comment: Falsely depressed results may occur on samples drawn from patients receiving N-Acetylcysteine (NAC) or Metamizole.       Protime-INR [[id number]]  (Abnormal) Collected:  04/22/20 0559    Specimen:  Blood Updated:  04/22/20 0711     Protime 15.7 Seconds      INR 1.28    aPTT [[id number]]  (Normal) Collected:  04/22/20 0559    Specimen:  Blood Updated:  04/22/20 0711     PTT 34.8 seconds     Narrative:       PTT = The equivalent PTT values for the therapeutic range of heparin levels at 0.3 to 0.5 U/ml are 55 to 70 seconds.    POC Glucose Once [[id number]]  (Normal) Collected:  04/22/20 0638    Specimen:  Blood Updated:  04/22/20 0639     Glucose 120 mg/dL     Blood Culture - Blood, Arm, Right [[id number]] Collected:  04/22/20 0601    Specimen:  Blood from Arm, Right Updated:  04/22/20 0622    Blood Culture - Blood, Hand, Left [[id number]] Collected:  04/22/20 0601    Specimen:  Blood from Hand, Left Updated:  04/22/20 0622    Magnesium [[id number]]  (Normal) Collected:  04/22/20 0358    Specimen:  Blood Updated:  04/22/20 0600     Magnesium 1.6 mg/dL     CBC & Differential [[id number]] Collected:  04/22/20 0358    Specimen:  Blood Updated:  04/22/20 0525    Narrative:       The following orders were created for panel order CBC & Differential.  Procedure                                Abnormality         Status                     ---------                               -----------         ------                     CBC Auto Differential[[id number]]        Abnormal            Final result                 Please view results for these tests on the individual orders.    CBC Auto Differential [[id number]]  (Abnormal) Collected:  04/22/20 0358    Specimen:  Blood Updated:  04/22/20 0525     WBC 14.91 10*3/mm3      RBC 3.76 10*6/mm3      Hemoglobin 12.1 g/dL      Hematocrit 38.1 %      .3 fL      MCH 32.2 pg      MCHC 31.8 g/dL      RDW 13.8 %      RDW-SD 51.7 fl      MPV 10.4 fL      Platelets 244 10*3/mm3      Neutrophil % 87.2 %      Lymphocyte % 5.1 %      Monocyte % 6.5 %      Eosinophil % 0.5 %      Basophil % 0.1 %      Immature Grans % 0.6 %      Neutrophils, Absolute 13.00 10*3/mm3      Lymphocytes, Absolute 0.76 10*3/mm3      Monocytes, Absolute 0.97 10*3/mm3      Eosinophils, Absolute 0.07 10*3/mm3      Basophils, Absolute 0.02 10*3/mm3      Immature Grans, Absolute 0.09 10*3/mm3      nRBC 0.0 /100 WBC     Narrative:       Appended report. These results have been appended to a previously verified report.    Scan Slide [[id number]]  (Normal) Collected:  04/22/20 0358    Specimen:  Blood Updated:  04/22/20 0525     RBC Morphology Normal     WBC Morphology Normal     Platelet Morphology Normal    Basic Metabolic Panel [[id number]]  (Abnormal) Collected:  04/22/20 0358    Specimen:  Blood Updated:  04/22/20 0436     Glucose 126 mg/dL      BUN 11 mg/dL      Creatinine 0.53 mg/dL      Sodium 133 mmol/L      Potassium 4.8 mmol/L      Chloride 95 mmol/L      CO2 27.0 mmol/L      Calcium 9.2 mg/dL      eGFR Non African Amer >150 mL/min/1.73      BUN/Creatinine Ratio 20.8     Anion Gap 11.0 mmol/L     Narrative:       GFR Normal >60  Chronic Kidney Disease <60  Kidney Failure <15      POC Glucose Once [[id number]]  (Normal) Collected:  04/21/20 1957    Specimen:  Blood  Updated:  04/21/20 1959     Glucose 113 mg/dL     POC Glucose Once [[id number]]  (Normal) Collected:  04/21/20 1524    Specimen:  Blood Updated:  04/21/20 1543     Glucose 111 mg/dL     Urinalysis With Microscopic If Indicated (No Culture) - Urine, Clean Catch [[id number]]  (Abnormal) Collected:  04/21/20 1243    Specimen:  Urine, Clean Catch Updated:  04/21/20 1309     Color, UA Yellow     Appearance, UA Clear     pH, UA <=5.0     Specific Gravity, UA 1.034     Glucose, UA Negative     Ketones, UA Negative     Bilirubin, UA Negative     Blood, UA Negative     Protein, UA Negative     Leuk Esterase, UA Negative     Nitrite, UA Negative     Urobilinogen, UA 0.2 E.U./dL    Narrative:       Urine microscopic not indicated.    [place] Draw [[id number]] Collected:  04/21/20 0939    Specimen:  Blood Updated:  04/21/20 1045    Narrative:       The following orders were created for panel order [facility] Draw.  Procedure                               Abnormality         Status                     ---------                               -----------         ------                     Light Blue Top[[id number]]                                   Final result               Green Top (Gel)[[id number]]                                  Final result               Lavender Top[[id number]]                                     Final result               Gold Top - SST[[id number]]                                   Final result                 Please view results for these tests on the individual orders.    Gold Top - SST [[id number]] Collected:  04/21/20 0939    Specimen:  Blood Updated:  04/21/20 1045     Extra Tube Hold for add-ons.     Comment: Auto resulted.       Light Blue Top [[id number]] Collected:  04/21/20 0939    Specimen:  Blood Updated:  04/21/20 1045     Extra Tube hold for add-on     Comment: Auto resulted       Green Top (Gel) [[id number]] Collected:  04/21/20 0939    Specimen:  Blood Updated:  04/21/20 1045     Extra Tube Hold for  add-ons.     Comment: Auto resulted.       Leigha Zavala [[id number]] Collected:  04/21/20 0939    Specimen:  Blood Updated:  04/21/20 1045     Extra Tube hold for add-on     Comment: Auto resulted       Comprehensive Metabolic Panel [[id number]]  (Abnormal) Collected:  04/21/20 0939    Specimen:  Blood Updated:  04/21/20 1021     Glucose 123 mg/dL      BUN 18 mg/dL      Creatinine 0.75 mg/dL      Sodium 131 mmol/L      Potassium 4.7 mmol/L      Chloride 94 mmol/L      CO2 25.0 mmol/L      Calcium 9.4 mg/dL      Total Protein 7.2 g/dL      Albumin 4.30 g/dL      ALT (SGPT) 18 U/L      AST (SGOT) 20 U/L      Alkaline Phosphatase 78 U/L      Total Bilirubin 0.7 mg/dL      eGFR Non African Amer 104 mL/min/1.73      Globulin 2.9 gm/dL      A/G Ratio 1.5 g/dL      BUN/Creatinine Ratio 24.0     Anion Gap 12.0 mmol/L     Narrative:       GFR Normal >60  Chronic Kidney Disease <60  Kidney Failure <15      Lipase [[id number]]  (Normal) Collected:  04/21/20 0939    Specimen:  Blood Updated:  04/21/20 1021     Lipase 22 U/L     Lactic Acid, Plasma [[id number]]  (Normal) Collected:  04/21/20 0939    Specimen:  Blood Updated:  04/21/20 1016     Lactate 1.2 mmol/L      Comment: Falsely depressed results may occur on samples drawn from patients receiving N-Acetylcysteine (NAC) or Metamizole.       CBC & Differential [[id number]] Collected:  04/21/20 0939    Specimen:  Blood Updated:  04/21/20 0951    Narrative:       The following orders were created for panel order CBC & Differential.  Procedure                               Abnormality         Status                     ---------                               -----------         ------                     CBC Auto Differential[[id number]]        Abnormal            Final result                 Please view results for these tests on the individual orders.    CBC Auto Differential [[id number]]  (Abnormal) Collected:  04/21/20 0939    Specimen:  Blood Updated:  04/21/20 0951     WBC 13.77  10*3/mm3      RBC 4.09 10*6/mm3      Hemoglobin 13.0 g/dL      Hematocrit 40.0 %      MCV 97.8 fL      MCH 31.8 pg      MCHC 32.5 g/dL      RDW 13.7 %      RDW-SD 49.3 fl      MPV 10.1 fL      Platelets 261 10*3/mm3      Neutrophil % 83.3 %      Lymphocyte % 6.8 %      Monocyte % 8.5 %      Eosinophil % 0.9 %      Basophil % 0.2 %      Immature Grans % 0.3 %      Neutrophils, Absolute 11.48 10*3/mm3      Lymphocytes, Absolute 0.93 10*3/mm3      Monocytes, Absolute 1.17 10*3/mm3      Eosinophils, Absolute 0.12 10*3/mm3      Basophils, Absolute 0.03 10*3/mm3      Immature Grans, Absolute 0.04 10*3/mm3      nRBC 0.0 /100 WBC           Imaging Results (All)     Procedure Component Value Units Date/Time    CT Abdomen Pelvis Without Contrast [[id number]] Collected:  04/22/20 0524     Updated:  04/22/20 0527    Narrative:       CT Abdomen Pelvis WO    INDICATION:   Increasing abdominal pain with nausea.    TECHNIQUE:   CT of the abdomen and pelvis without IV contrast. Coronal and sagittal reconstructions were obtained.  Radiation dose reduction techniques included automated exposure control or exposure modulation based on body size. Count of known CT and cardiac nuc  med studies performed in previous 12 months: 1.     COMPARISON:   4/21/2020    FINDINGS:  There is mild atelectasis in the lung bases. Gallbladder contains some high density material which may be some vicariously excreted IV contrast from the prior CT. No definite renal or ureteral stones. No hydronephrosis. There is a vascular calcification  in the right kidney. Solid abdominal organs are within normal limits. Urinary bladder is normal. It contains excreted IV contrast. There is atherosclerotic disease with no aortic aneurysm. There is multilevel degenerative disease in the lumbar spine with  multilevel spondylolisthesis. There are bilateral L5 pars defects.    There has been interval development of a moderate amount of free intraperitoneal air compatible with  a bowel perforation. No clear site of perforation is identified. There are multiple floating gas-filled small bowel loops in the midabdomen suggesting an  ileus or developing small bowel obstruction. The mid to lower colon is relatively decompressed. The cecum is in the right mid to upper abdomen. The appendix is normal.      Impression:         1. Interval development of a moderate amount of free intraperitoneal air compatible with a bowel perforation. Site of perforation is unclear based on the images provided. General surgical consultation is recommended.  2. Prominent small bowel loops may reflect an ileus or developing small bowel obstruction.  3. The appendix is normal.  4. Additional findings as above.    NOTIFICATION: Critical Value/emergent results were called by telephone at the time of interpretation on 4/22/2020 5:22 AM to MIKAYLA Cook who verbally acknowledged these results.                Signer Name: Juice Bland MD   Signed: 4/22/2020 5:24 AM   Workstation Name: [place]    [facility]    CT Abdomen Pelvis With Contrast [[id number]] Collected:  04/21/20 1048     Updated:  04/21/20 1305    Narrative:       EXAMINATION: CT ABDOMEN/PELVIS WITH CONTRAST-04/21/2020:      INDICATION: Ventral hernia, RLQ tender, right lower quadrant tenderness.     TECHNIQUE: Multiple axial CT imaging was obtained of the abdomen and  pelvis following the administration of intravenous contrast.     The radiation dose reduction device was turned on for each scan per the  ALARA (As Low as Reasonably Achievable) protocol.     COMPARISON: NONE.     FINDINGS:      ABDOMEN: The lung bases are grossly clear with no features of pneumonia.  The liver is homogeneous in appearance. No stones seen in the  gallbladder. The spleen is homogeneous in appearance. The kidneys and  adrenal glands are unremarkable. Vascular calcification seen within the  abdominal aorta and iliac vessels. The pancreas is  homogeneous in  appearance. There is some free fluid identified along the left flank and  left paracolic gutter. The appearance of the bowel is grossly  unremarkable in appearance. There is some fluid-filled loops of small  bowel identified in which a mild enteritis cannot be completely  excluded. Etiology of the fluid is uncertain. There is no abdominal or  retroperitoneal adenopathy. Small cyst identified along the subcutaneous  tissues in the right lower back. Tiny periumbilical hernia containing  minimal fat. No evidence of strangulation.     PELVIS: The pelvic organs are unremarkable. The pelvic portions of the  gastrointestinal tract are within normal limits. No free fluid or free  air. No abnormal mass or fluid collection is identified. The appendix is  radiographically normal in appearance. No pelvic adenopathy. No abnormal  mass or fluid collection is identified. The bony structures reveal no  evidence of osseous abnormality. Degenerative changes seen within the  spine and pelvis.       Impression:       Mild fluid-filled loops of small bowel in which a mild  enteritis cannot be excluded. No significant wall thickening. Minimal  free fluid seen along the left paracolic gutter. No other CT evidence of  acute intra-abdominal or pelvic abnormality. The appendix is normal. The  lung bases are clear with no features of pneumonia.     D:  04/21/2020  E:  04/21/2020     This report was finalized on 4/21/2020 1:02 PM by Dr. Clair Pedro MD.             ECG/EMG Results (all)     Procedure Component Value Units Date/Time    ECG 12 Lead [[id number]] Collected:  04/22/20 0643     Updated:  04/22/20 0843    Narrative:       Test Reason : Surgery  Blood Pressure : **/** mmHG  Vent. Rate : 080 BPM     Atrial Rate : 080 BPM     P-R Int : 174 ms          QRS Dur : 086 ms      QT Int : 366 ms       P-R-T Axes : 062 036 038 degrees     QTc Int : 422 ms    Normal sinus rhythm  Normal ECG  When compared with ECG of  16-DEC-2017 10:14,  No significant change was found    Referred By:             Confirmed By:              Operative/Procedure Notes (all)      Lisa Denis MD at 04/22/20 0545  Version 1 of 1     Procedure Orders    1. Critical Care [[id number]] ordered by Lisa Denis MD at 04/22/20 0545            Post-procedure Diagnoses    1. Bowel perforation (CMS/[facility]) [K63.1]             Critical Care  Performed by: Lisa Denis MD  Authorized by: Lisa Denis MD   Total critical care time: 30 minutes  Critical care time was exclusive of separately billable procedures and treating other patients.  Critical care was necessary to treat or prevent imminent or life-threatening deterioration of the following conditions: sepsis.  Critical care was time spent personally by me on the following activities: blood draw for specimens, development of treatment plan with patient or surrogate, examination of patient, obtaining history from patient or surrogate, ordering and performing treatments and interventions, ordering and review of laboratory studies, ordering and review of radiographic studies, pulse oximetry and review of old charts.      63 y/o male admitted for abd pain 4/21 w/ evaluation by Dr. Nugent in ED with initially non-revealing CT in ER who has developed worsening abd pain.  Tiffanie Dsouza, hospitalist MIKAYLA notified and after examination ordered CT.  Pt has also had increasing oxygen requirements which were not reported but appear to be related to splinting and pain meds.  CT w/ bowel perforation.  Dr. Nugent notified by nursing as we were re-examining pt who has surgical abdomen w/ rebound and guarding diffusely; tense abdomen.  Vitals obtained by myself and Tiffanie w/ 's/70's, temp 99.1, HR 70's and 94% on 4L.  Pt has LR ordered but for unclear reasons are not being given.  Zosyn, LR, coags, cultures, type and screen ordered.  All of above including plan was d/w pt at bedside.      Electronically signed by Cira  Lisa DIANE MD at 04/22/20 0552     El Nugent MD at 04/22/20 0731  Version 1 of 1         LAPAROTOMY EXPLORATORY  Progress Note    Damion Llanos  4/22/2020    Pre-op Diagnosis:   Perforated viscus       Post-Op Diagnosis Codes:   Perforated cecum    Procedure/CPT® Codes:      Procedure(s):  LAPAROTOMY EXPLORATORY, RIGHT HEMICOLECTOMY, ILEOSTOMY, MUCUS FISTULA    Surgeon(s):  El Nugent MD    Anesthesia: General with Block    Staff:   Circulator: Yudelka Toribio RN; Charisse Stoner RN  Scrub Person: Aurelia Kumar  Nursing Assistant: Gladys Soni  Assistant: Arian Umanzor PA-C    Estimated Blood Loss: 50 mL    Urine Voided: 300 mL    Specimens:                Specimens     ID Source Type Tests Collected By Collected At Frozen?      A Small Intestine Tissue · TISSUE PATHOLOGY EXAM   El Nugent MD 4/22/20 8533      Description: TERMINAL ILEUM, CECUM, RIGHT COLON, APPENDIX    This specimen was not marked as sent.                Drains:   Closed/Suction Drain 1 Left LLQ Bulb (Active)       NG/OG Tube Nasogastric 18 Fr Right nostril (Active)       Ileostomy Standard (Mary, end) RUQ (Active)       Urethral Catheter Latex 16 Fr. (Active)       Findings:   1.  Ischemic cecum with perforation requiring right hemicolectomy, ileostomy and mucous fistula.    Complications: None      El Nugent MD     Date: 4/22/2020  Time: 09:21        Electronically signed by El Nugent MD at 04/22/20 0922     El Nugent MD at 04/22/20 0731  Version 1 of 1         OPERATIVE NOTE    Patient Name:  Damion Llanos  YOB: 1955  [id number]    Date of Surgery:  4/22/2020      PREOPERATIVE DIAGNOSIS: Perforated viscus      POSTOPERATIVE DIAGNOSIS: Perforated cecum       PROCEDURE PERFORMED:   1.  Exploratory laparotomy  2.  Right hemicolectomy with end ileostomy and mucous fistula       SURGEON: El Nugent MD       ASSISTANT: Arian Umanzor PA-C       SPECIMENS:   1.  Terminal  ileum, cecum, appendix, and right colon       ANESTHESIA: General.        FINDINGS:   1.  Ischemic cecum with perforation requiring right hemicolectomy, ileostomy and mucous fistula.       INDICATIONS: The patient is a 64 y.o. male who presented with abdominal pain.  Subsequent initial imaging was nondiagnostic.  He was admitted for serial exams.  His examination worsened and he underwent repeat CT scanning.  This now demonstrated perforated viscus with free intraperitoneal air.  The risks and benefits of exploratory laparotomy were discussed at length with the patient and he agreed to proceed.       DESCRIPTION OF PROCEDURE:      After obtaining informed consent, the patient was taken to the operating room and placed in supine  position. After appropriate DVT and antibiotic prophylaxis, general anesthesia was induced. TAP blocks were placed by the anesthesia staff bilaterally to aid in post-op pain control . The abdomen  was prepped and draped in standard sterile fashion, and a vertical midline incision was made.  Electrocautery dissection was carried down the level of the anterior fascia which is elevated to intraoperative body.  Blunt dissection was carried into the peritoneal cavity.  There was a rush of air upon entering the abdominal cavity.  The peritoneal and fascial incisions were then extended to the length of the skin incision.  A Bookwalter retractor was brought to the field to aid in retraction and visualization of structures.    Upon entering the abdomen, there was some free air and minimal succus seen.  A meticulous inspection of the GI tract began.  Starting at the esophageal hiatus and moving distally the entire stomach and duodenum were normal.  The small bowel was then run from the ligament of Treitz distally.  This was all normal until we reached the terminal ileum and there was an inflammatory rind near this.  As we progressed to the cecum cecum was clearly ischemic and there was a nathanael  perforation in the cecum.  This was clearly the source of the patient's free intraperitoneal air and peritonitis.  The remainder of the ascending, transverse, descending, and sigmoid colons were inspected and were normal without thickness wall thickening or mass.  There is no evidence of obstruction.  The mesentery of the right colon was palpated and was pulsatile.  Given these findings the decision was made to proceed with right hemicolectomy.    There were adhesions of omentum to the inferior edge of the liver and the gallbladder fossa and these were taken down using electrocautery dissection without injury to the gallbladder.  The lateral attachments of the right colon were incised using electrocautery along the white line of Toldt.  This allowed for medial mobilization of the right colon.  The kidney on the right was left in situ, the ureter was identified and spared, and the duodenal sweep was identified and spared in the retroperitoneum.  In a stepwise fashion the entire right colon was brought to the midline.  The hepatic flexure was completely taken down as well.  This allowed us to fully identify the ischemic cecum and perforation.  We therefore decided to perform a right hemicolectomy.    An appropriate proximal resection site on the distal terminal ileum was decided upon and transected using a stapling device.  In similar fashion the omentum was taken off the proximal transverse colon, and an appropriate resection site on the proximal transverse colon was side upon and transected using a stapling device.  The mesentery to the resected specimen was taken between clamps, the contents clamps tied, and these were divided.  Again attention was paid to not enter the retroperitoneum and the ureter was spared.  The specimen was then passed off.    Given the patient's history of alcohol abuse, as well as his illness and overall poor medical status, it was felt best to perform ileostomy and mucous fistula rather  than primary anastomosis.  This is because of my concern that the patient may have alcohol withdrawal in the next 24 to 48 hours, as well as a unclear etiology of his initial ischemic event.  Both of these factors may make it difficult to differentiate a potential anastomotic leak from other nonsurgical issues.  I therefore felt it safest to perform ileostomy and mucous fistula.    An appropriate site for combined ileostomy mucous fistula was decided in the right upper quadrant.  A disc of skin was resected and then electrocautery carried down to the anterior fascia.  The fascia over the rectus muscle was incised with a cruciate incision and the peritoneal cavity was entered.  This was dilated to 4 fingerbreadths.  A [id number] ileostomy mucous fistula was then brought through this defect in anatomic positioning and laid without tension on the anterior abdominal wall.  The operating team then changed gowns and gloves.    The abdomen was then copiously irrigated with normal saline until clear.  There is no further succus noted.  There was a slight amount of bleeding from the inferior edge of the liver from the previous adhesions and these were controlled with electrocautery.  A 10 flat DAISY drain was placed in the pelvis and then brought up through separate stab incision the left lower abdomen.  He was sutured in place using a nylon suture.  Again the abdomen was irrigated with saline until clear.  All bowel was run and there was no evidence of other injury or abnormality.  The fascia was then closed using running loop PDS sutures x2, as well as internal retention sutures of 0 Vicryl.  The wounds were then copiously irrigated with normal saline until clear and closed loosely using skin staples.  The interstices between the staples were then packed with saline soaked gauze and covered a dry sterile dressing.  A 1010 drape was placed over top of this.    A combination ileostomy/mucous fistula was then matured in the  "right upper quadrant needing absorbable sutures.  The completion anastomosis both lumens were patent below the level of the fascia and the ostomies were pink.  An ostomy appliance was placed.    All sponge and needle counts were correct times two at the completion of the procedure. The patient recovered from anesthesia, was extubated in the operating room  and was transported to the PACU  in stable condition.      El Nugent MD  4/22/2020  09:22            Electronically signed by El Nugent MD at 04/22/20 0929          Physician Progress Notes (all)      El Nugent MD at 04/22/20 0703          Patient Name:  Damion Llanos  YOB: 1955  [id number]    Surgery Progress Note    Date of visit: 4/22/2020    Subjective   Subjective: Patient with worsening pain overnight. Repeat CT scan obtained        Objective     Objective:     /80 (BP Location: Right arm, Patient Position: Lying)   Pulse 80   Temp 98.7 °F (37.1 °C) (Temporal)   Resp 18   Ht 172.7 cm (68\")   Wt 108 kg (237 lb)   SpO2 96%   BMI 36.04 kg/m²      Intake/Output Summary (Last 24 hours) at 4/22/2020 0703  Last data filed at 4/22/2020 0038  Gross per 24 hour   Intake 1000 ml   Output 1200 ml   Net -200 ml       CV:  Rhythm  regular and rate regular   L:  Mild rhonchi to auscultation bilaterally   Abd:  Bowel sounds hypoactive, soft, more tender with peritoneal signs  Ext:  No cyanosis, clubbing, edema    Recent labs that are back at this time have been reviewed.  CT scan of the abdomen and pelvis was personally viewed by me as well as a final dictated and edited report.  This now shows the development of a significant amount of free intraperitoneal air.  Interestingly, there is a minimal amount of free fluid.  The source of the perforation is not readily apparent, though I suspect either a perforated gastric ulcer, or perforated diverticulitis.  In either event evidence of worsening intra-abdominal " catastrophe.      Assessment/Plan     Assessment/ Plan:    Hospital Problem List     * (Principal) Abdominal pain - We we will proceed emergently to the operating room for laparotomy.  I discussed the risk and benefits at length with the patient including the possibility of ostomy, and he is agreeable to proceed.    Tobacco abuse    Primary osteoarthritis of both hips    Type 2 diabetes mellitus with complication, without long-term current use of insulin (CMS/[facility], [facility])        Dependent edema    Chronic pain    Chronic alcohol abuse        Hypoxia              El Nugent MD  4/22/2020  07:03        Electronically signed by El Nugent MD at 04/22/20 0705          Consult Notes (all)      El Nugent MD at 04/21/20 1256          Patient Name:  Damion Llanos  YOB: 1955  [id number]       Patient Care Team:  Konstantin Palm MD as PCP - General (Internal Medicine)      General Surgery Consult Note     Date of Consultation: 04/21/20    Consulting Physician - Cornelius Xavier DO    Reason for Consult - Abdominal pain    Subjective     I have been asked to see  Damion Llanos , a 64 y.o. male in consultation for abdominal pain.  He reports that he was awoken at 3 AM this morning with abdominal pain in the right lower quadrant.  This subsequently worsened, and expanded across his lower abdomen eventually up along his anterior abdominal wall.  This is associated with some nausea and some dry heaves.  He does report tolerating a little bit of p.o. intake.  He also took a Lortab at home, which he has for back pain.  He reports a normal bowel movement this morning without bright red blood per rectum or melena.  It was a normal caliber and consistency.  Due to his abdominal pain he presented to the emergency department.  Subsequent evaluation there was largely unremarkable with a mildly elevated white count of 13,000 and a CT scan that showed only trace amount of free fluid in the left  abdomen.  He is being admitted by the hospitalist for further evaluation and we have been asked to see him for possible surgical management.  Currently he is relaxed in bed complaining of abdominal pain.    Of note, he smokes at least 1 pack/day.  He also reports drinking at least 7-8 beers daily.       Allergy: No Known Allergies    Medications:        No current facility-administered medications on file prior to encounter.      Current Outpatient Medications on File Prior to Encounter   Medication Sig   • albuterol (PROVENTIL HFA;VENTOLIN HFA) 108 (90 Base) MCG/ACT inhaler Inhale 2 puffs Every 4 (Four) Hours As Needed for Wheezing.   • allopurinol (ZYLOPRIM) 300 MG tablet Take 300 mg by mouth Daily.   • carvedilol (COREG) 12.5 MG tablet Take 12.5 mg by mouth 2 (Two) Times a Day With Meals.   • ferrous sulfate 325 (65 FE) MG tablet Take 325 mg by mouth Every Other Day.   • furosemide (LASIX) 40 MG tablet Take 40 mg by mouth 2 (Two) Times a Day.   • gabapentin (NEURONTIN) 300 MG capsule Take 300 mg by mouth 3 (Three) Times a Day.   • hydrALAZINE (APRESOLINE) 50 MG tablet Take 50 mg by mouth 3 (Three) Times a Day.   • HYDROcodone-acetaminophen (NORCO)  MG per tablet Take 1 tablet by mouth Every 8 (Eight) Hours As Needed for Moderate Pain .   • lisinopril (PRINIVIL,ZESTRIL) 40 MG tablet Take 20 mg by mouth Daily.   • metFORMIN (GLUCOPHAGE) 500 MG tablet Take 500 mg by mouth 2 (Two) Times a Day With Meals.   • pantoprazole (PROTONIX) 40 MG EC tablet Take 40 mg by mouth Daily.   • potassium chloride (K-DUR,KLOR-CON) 10 MEQ CR tablet Take 10 mEq by mouth 2 (Two) Times a Day As Needed.   • simvastatin (ZOCOR) 20 MG tablet Take 20 mg by mouth Every Night.   • aspirin 81 MG chewable tablet Chew 81 mg Daily.   • cyclobenzaprine (FLEXERIL) 10 MG tablet Take 10 mg by mouth 3 (Three) Times a Day As Needed for Muscle Spasms.   • magnesium oxide (MAGOX) 400 (241.3 MG) MG tablet tablet Take 400 mg by mouth 2 (Two) Times a  "Day.   • NIFEdipine XL (NIFEDICAL XL) 30 MG 24 hr tablet Take 30 mg by mouth Daily.   • omeprazole (priLOSEC) 20 MG capsule Take 1 capsule by mouth twice daily for 1 month then resume daily dosing.   • Polysacch Fe Cmp-Fe Heme Poly (FEOSOL BIFERA) 28 MG tablet Take 1 tablet by mouth Daily.       PMHx:   Past Medical History:   Diagnosis Date   • Anemia    • Arthritis    • Diabetes mellitus (CMS/HCC)    • GERD (gastroesophageal reflux disease)    • Gout    • Hyperlipidemia    • Hypertension    - Tobacco abuse  - EtOH abuse  - Obesity with BMI 36    Past Surgical History:  Open umbilical hernia repair    Family History: (+) Brain tumor, mother    Social History:    Tobacco use: 1 ppd x 50 years    EtOH use : 7-8 beers nightly   Illicit drug use: Denies      Review of Systems        Constitutional: No fevers, chills or malaise   Eyes: Denies visual changes    Cardiovascular: Denies chest pain, palpitations   Pulmonary: Denies cough or shortness of breath   Abdominal/ GI: See HPI    Genitourinary: Denies dysuria or hematuria   Musculoskeletal: Denies any but chronic joint aches, pains or deformities   Psychiatric: No recent mood changes   Neurologic: No paresthesias or loss of function         Objective     Physical Exam:      Vital Signs  /100   Pulse 85   Temp 98.6 °F (37 °C) (Oral)   Resp 20   Ht 172.7 cm (68\")   Wt 108 kg (237 lb)   SpO2 91%   BMI 36.04 kg/m²      Intake/Output Summary (Last 24 hours) at 4/21/2020 1257  Last data filed at 4/21/2020 1055  Gross per 24 hour   Intake 1000 ml   Output --   Net 1000 ml         Physical Exam:    Head: Normocephalic, atraumatic.   Eyes: Pupils equal, round, react to light and accommodation.   Mouth: Oral mucosa without lesions,   Neck: No masses, lymphadenopathy or carotid bruits bilaterally   CV: Rhythm  and rate regular , no  murmurs, rubs or gallops  Lungs: Clear  to auscultation bilaterally   Abdomen: Bowel sounds positive  , soft, obese, tender to " palpation throughout. No hernias or masses.  Groin : No obvious hernias bilaterally   Extremities:  No cyanosis, clubbing or edema bilaterally   Lymphatics: No abnormal lymphadenopathy appreciated   Neurologic: No gross deficits       Results Review: I have personally reviewed all of the recent lab and imaging results available at this time.  Laboratory exam reveals a white count of 13,000 with a left shift.  Hemoglobin 13.0.  Platelet count is 261.  Comprehensive metabolic panel essentially normal other than a slightly low sodium of 131.  Lactate normal at 1.2.  Lipase normal at 22.    CT scan of the abdomen pelvis personally viewed by me as well as a final dictated and edited report.  This demonstrates no evidence of free air.  There is no evidence of bowel dilation. The gallbladder is visualized and is normal.  There are no gallstones.  The appendix is visualized and is normal without inflammation.  There is a scant amount of free fluid in the left lateral abdomen of unclear etiology.  There is no evidence of bowel wall thickening though any enteritis cannot be completely excluded.  No free air of note.  No abdominal wall hernias of note.      Assessment/Plan     Assessment and Plan:    Abdominal pain - He appears quite tender, though the cause of his abdominal pain is not clear.  I agree with admission, IV hydration and pain control.  I will continue to follow this patient with you.  If he is not improved in 24 to 48 hours you may need to reimage the patient.     Tobacco abuse  Alcohol abuse -  I would also be sure to cover him for alcohol withdrawal symptoms.    Hypertension  Obesity  Diabetes  Hyperlipidemia            I discussed the patient's findings and my recommendations with the patient and/or family, as well as the primary team     El Nugent MD  04/21/20  12:57          Electronically signed by El Nugent MD at 04/21/20 1311

## 2020-04-22 NOTE — BRIEF OP NOTE
LAPAROTOMY EXPLORATORY  Progress Note    Damion Llanos  4/22/2020    Pre-op Diagnosis:   Perforated viscus       Post-Op Diagnosis Codes:   Perforated cecum    Procedure/CPT® Codes:      Procedure(s):  LAPAROTOMY EXPLORATORY, RIGHT HEMICOLECTOMY, ILEOSTOMY, MUCUS FISTULA    Surgeon(s):  El Nugent MD    Anesthesia: General with Block    Staff:   Circulator: Yudelka Toribio RN; Charisse Stoner RN  Scrub Person: Aurelia Kumar  Nursing Assistant: Gladys Soni  Assistant: Arian Umanzor PA-C    Estimated Blood Loss: 50 mL    Urine Voided: 300 mL    Specimens:                Specimens     ID Source Type Tests Collected By Collected At Frozen?      A Small Intestine Tissue · TISSUE PATHOLOGY EXAM   El Nugent MD 4/22/20 0833      Description: TERMINAL ILEUM, CECUM, RIGHT COLON, APPENDIX    This specimen was not marked as sent.                Drains:   Closed/Suction Drain 1 Left LLQ Bulb (Active)       NG/OG Tube Nasogastric 18 Fr Right nostril (Active)       Ileostomy Standard (Mary, rhys) RUQ (Active)       Urethral Catheter Latex 16 Fr. (Active)       Findings:   1.  Ischemic cecum with perforation requiring right hemicolectomy, ileostomy and mucous fistula.    Complications: None      El Nugent MD     Date: 4/22/2020  Time: 09:21

## 2020-04-22 NOTE — OP NOTE
OPERATIVE NOTE    Patient Name:  Damion Llanos  YOB: 1955  [id number]    Date of Surgery:  4/22/2020      PREOPERATIVE DIAGNOSIS: Perforated viscus      POSTOPERATIVE DIAGNOSIS: Perforated cecum       PROCEDURE PERFORMED:   1.  Exploratory laparotomy  2.  Right hemicolectomy with end ileostomy and mucous fistula       SURGEON: El Nugent MD       ASSISTANT: Arian Umanzor PA-C       SPECIMENS:   1.  Terminal ileum, cecum, appendix, and right colon       ANESTHESIA: General.        FINDINGS:   1.  Ischemic cecum with perforation requiring right hemicolectomy, ileostomy and mucous fistula.       INDICATIONS: The patient is a 64 y.o. male who presented with abdominal pain.  Subsequent initial imaging was nondiagnostic.  He was admitted for serial exams.  His examination worsened and he underwent repeat CT scanning.  This now demonstrated perforated viscus with free intraperitoneal air.  The risks and benefits of exploratory laparotomy were discussed at length with the patient and he agreed to proceed.       DESCRIPTION OF PROCEDURE:      After obtaining informed consent, the patient was taken to the operating room and placed in supine  position. After appropriate DVT and antibiotic prophylaxis, general anesthesia was induced. TAP blocks were placed by the anesthesia staff bilaterally to aid in post-op pain control . The abdomen  was prepped and draped in standard sterile fashion, and a vertical midline incision was made.  Electrocautery dissection was carried down the level of the anterior fascia which is elevated to intraoperative body.  Blunt dissection was carried into the peritoneal cavity.  There was a rush of air upon entering the abdominal cavity.  The peritoneal and fascial incisions were then extended to the length of the skin incision.  A Bookwalter retractor was brought to the field to aid in retraction and visualization of structures.    Upon entering the abdomen, there was some free  air and minimal succus seen.  A meticulous inspection of the GI tract began.  Starting at the esophageal hiatus and moving distally the entire stomach and duodenum were normal.  The small bowel was then run from the ligament of Treitz distally.  This was all normal until we reached the terminal ileum and there was an inflammatory rind near this.  As we progressed to the cecum cecum was clearly ischemic and there was a nathanael perforation in the cecum.  This was clearly the source of the patient's free intraperitoneal air and peritonitis.  The remainder of the ascending, transverse, descending, and sigmoid colons were inspected and were normal without thickness wall thickening or mass.  There is no evidence of obstruction.  The mesentery of the right colon was palpated and was pulsatile.  Given these findings the decision was made to proceed with right hemicolectomy.    There were adhesions of omentum to the inferior edge of the liver and the gallbladder fossa and these were taken down using electrocautery dissection without injury to the gallbladder.  The lateral attachments of the right colon were incised using electrocautery along the white line of Toldt.  This allowed for medial mobilization of the right colon.  The kidney on the right was left in situ, the ureter was identified and spared, and the duodenal sweep was identified and spared in the retroperitoneum.  In a stepwise fashion the entire right colon was brought to the midline.  The hepatic flexure was completely taken down as well.  This allowed us to fully identify the ischemic cecum and perforation.  We therefore decided to perform a right hemicolectomy.    An appropriate proximal resection site on the distal terminal ileum was decided upon and transected using a stapling device.  In similar fashion the omentum was taken off the proximal transverse colon, and an appropriate resection site on the proximal transverse colon was side upon and transected using  a stapling device.  The mesentery to the resected specimen was taken between clamps, the contents clamps tied, and these were divided.  Again attention was paid to not enter the retroperitoneum and the ureter was spared.  The specimen was then passed off.    Given the patient's history of alcohol abuse, as well as his illness and overall poor medical status, it was felt best to perform ileostomy and mucous fistula rather than primary anastomosis.  This is because of my concern that the patient may have alcohol withdrawal in the next 24 to 48 hours, as well as a unclear etiology of his initial ischemic event.  Both of these factors may make it difficult to differentiate a potential anastomotic leak from other nonsurgical issues.  I therefore felt it safest to perform ileostomy and mucous fistula.    An appropriate site for combined ileostomy mucous fistula was decided in the right upper quadrant.  A disc of skin was resected and then electrocautery carried down to the anterior fascia.  The fascia over the rectus muscle was incised with a cruciate incision and the peritoneal cavity was entered.  This was dilated to 4 fingerbreadths.  A [id number] ileostomy mucous fistula was then brought through this defect in anatomic positioning and laid without tension on the anterior abdominal wall.  The operating team then changed gowns and gloves.    The abdomen was then copiously irrigated with normal saline until clear.  There is no further succus noted.  There was a slight amount of bleeding from the inferior edge of the liver from the previous adhesions and these were controlled with electrocautery.  A 10 flat DAISY drain was placed in the pelvis and then brought up through separate stab incision the left lower abdomen.  He was sutured in place using a nylon suture.  Again the abdomen was irrigated with saline until clear.  All bowel was run and there was no evidence of other injury or abnormality.  The fascia was then closed  using running loop PDS sutures x2, as well as internal retention sutures of 0 Vicryl.  The wounds were then copiously irrigated with normal saline until clear and closed loosely using skin staples.  The interstices between the staples were then packed with saline soaked gauze and covered a dry sterile dressing.  A 1010 drape was placed over top of this.    A combination ileostomy/mucous fistula was then matured in the right upper quadrant needing absorbable sutures.  The completion anastomosis both lumens were patent below the level of the fascia and the ostomies were pink.  An ostomy appliance was placed.    All sponge and needle counts were correct times two at the completion of the procedure. The patient recovered from anesthesia, was extubated in the operating room  and was transported to the PACU  in stable condition.      El Nugent MD  4/22/2020  09:22

## 2020-04-22 NOTE — SIGNIFICANT NOTE
This note also relates to the following rows which could not be included:  Heart Rate - Cannot attach notes to unvalidated device data  BP - Cannot attach notes to unvalidated device data  Noninvasive MAP (mmHg) - Cannot attach notes to unvalidated device data  SpO2 - Cannot attach notes to unvalidated device data  ETCO2 (mmHg) - Cannot attach notes to unvalidated device data

## 2020-04-22 NOTE — ANESTHESIA PROCEDURE NOTES
Peripheral Block      Patient reassessed immediately prior to procedure    Patient location during procedure: OR  Reason for block: at surgeon's request and post-op pain management  Performed by  Anesthesiologist: Scott Pinedo MD  Preanesthetic Checklist  Completed: patient identified, site marked, surgical consent, pre-op evaluation, timeout performed, IV checked, risks and benefits discussed and monitors and equipment checked  Prep:  Pt Position: supine  Sterile barriers:cap, gloves, sterile barriers and mask  Prep: ChloraPrep  Patient monitoring: blood pressure monitoring, continuous pulse oximetry and EKG  Procedure  Sedation:yes  Performed under: general  Guidance:ultrasound guided  Images:still images obtained, printed/placed on chart    Laterality:Bilateral  Block Type:TAP  Injection Technique:single-shot  Needle Type:short-bevel and echogenic  Needle Gauge:20 G  Resistance on Injection: none    Medications Used: dexamethasone sodium phosphate injection, 4 mg  bupivacaine PF (MARCAINE) 0.25 % injection, 60 mL  Med admintered at 4/22/2020 7:13 AM      Medications  Comment:Block Injection:  LA dose divided between Right and Left block        Post Assessment  Injection Assessment: negative aspiration for heme, incremental injection and no paresthesia on injection  Patient Tolerance:comfortable throughout block  Complications:no  Additional Notes      Under Ultrasound guidance, a BBraun 4inch 360 degree needle was advanced with Normal Saline hydro dissection of tissue.  The Internal Oblique and Transversus Abdominus muscles where visualized.  At or before the aponeurosis of Internal Oblique, local anesthetic spread was visualized in the Transversus Abdominus Plane. Injection was made incrementally with aspiration every 5 mls.  There was no  intravascular injection,  injection pressure was normal, there was no neural injection, and the procedure was completed without difficulty.  Thank You.

## 2020-04-22 NOTE — PROGRESS NOTES
[facility] Medicine Services  PROGRESS NOTE    Patient Name: Damion Llanos  : 1955  MRN: [id number]    Date of Admission: 2020  Primary Care Physician: Konstantin Palm MD    Subjective   Subjective     CC:  Follow up abdominal pain    HPI:  Had worsening pain overnight, repeat imaging with free air and taken emergently to the OR and found to have perforated bowel. Seen in the PACU, drowsy but able to state he is sore but better than before surgery. Requiring some extra O2 support.     PACU has been calling and updating family regularly.    Review of Systems  Gen- No fevers, chills  CV- No chest pain, palpitations  Resp- No cough, dyspnea  GI- No N/V/D, Yes abd pain    Objective   Objective     Vital Signs:   Temp:  [98.2 °F (36.8 °C)-100.3 °F (37.9 °C)] 99.8 °F (37.7 °C)  Heart Rate:  [74-99] 94  Resp:  [18-24] 20  BP: (112-175)/(59-88) 138/75        Physical Exam:  Constitutional: Laying on PACU stretcher, NAD, lethargic but responding appropriately  HENT: NCAT, mucous membranes moist  Respiratory: Clear to auscultation bilaterally, respiratory effort is normal  Cardiovascular: RRR, no murmurs, rubs, or gallops, palpable pedal pulses bilaterally  Gastrointestinal: Abdomen with multiple bandaged surgical incisions, ileostomy with slight bloody output, DAISY drain, no bowel sounds appreciated, tender  Musculoskeletal: Mild BL LE edema  Psychiatric: Appropriate affect, cooperative    Results Reviewed:  Results from last 7 days   Lab Units 20  0559 208 20  0939   WBC 10*3/mm3  --  14.91* 13.77*   HEMOGLOBIN g/dL  --  12.1* 13.0   HEMATOCRIT %  --  38.1 40.0   PLATELETS 10*3/mm3  --  244 261   INR  1.28*  --   --      Results from last 7 days   Lab Units 20  0358 20  0939   SODIUM mmol/L 133* 131*   POTASSIUM mmol/L 4.8 4.7   CHLORIDE mmol/L 95* 94*   CO2 mmol/L 27.0 25.0   BUN mg/dL 11 18   CREATININE mg/dL 0.53* 0.75*   GLUCOSE mg/dL 126* 123*      CALCIUM mg/dL 9.2 9.4   ALT (SGPT) U/L  --  18   AST (SGOT) U/L  --  20     Estimated Creatinine Clearance: 108.2 mL/min (A) (by C-G formula based on SCr of 0.53 mg/dL (L)).    Microbiology Results Abnormal     None          Imaging Results (Last 24 Hours)     Procedure Component Value Units Date/Time    CT Abdomen Pelvis Without Contrast [[id number]] Collected:  04/22/20 0524     Updated:  04/22/20 0527    Narrative:       CT Abdomen Pelvis WO    INDICATION:   Increasing abdominal pain with nausea.    TECHNIQUE:   CT of the abdomen and pelvis without IV contrast. Coronal and sagittal reconstructions were obtained.  Radiation dose reduction techniques included automated exposure control or exposure modulation based on body size. Count of known CT and cardiac nuc  med studies performed in previous 12 months: 1.     COMPARISON:   4/21/2020    FINDINGS:  There is mild atelectasis in the lung bases. Gallbladder contains some high density material which may be some vicariously excreted IV contrast from the prior CT. No definite renal or ureteral stones. No hydronephrosis. There is a vascular calcification  in the right kidney. Solid abdominal organs are within normal limits. Urinary bladder is normal. It contains excreted IV contrast. There is atherosclerotic disease with no aortic aneurysm. There is multilevel degenerative disease in the lumbar spine with  multilevel spondylolisthesis. There are bilateral L5 pars defects.    There has been interval development of a moderate amount of free intraperitoneal air compatible with a bowel perforation. No clear site of perforation is identified. There are multiple floating gas-filled small bowel loops in the midabdomen suggesting an  ileus or developing small bowel obstruction. The mid to lower colon is relatively decompressed. The cecum is in the right mid to upper abdomen. The appendix is normal.      Impression:         1. Interval development of a moderate amount of free  intraperitoneal air compatible with a bowel perforation. Site of perforation is unclear based on the images provided. General surgical consultation is recommended.  2. Prominent small bowel loops may reflect an ileus or developing small bowel obstruction.  3. The appendix is normal.  4. Additional findings as above.    NOTIFICATION: Critical Value/emergent results were called by telephone at the time of interpretation on 4/22/2020 5:22 AM to MIKAYLA Cook who verbally acknowledged these results.                Signer Name: Juice Bland MD   Signed: 4/22/2020 5:24 AM   Workstation Name: [facility]    [facility]          Results for orders placed during the hospital encounter of 12/16/17   Adult Transthoracic Echo Complete W/ Cont if Necessary Per Protocol    Narrative · Left ventricular systolic function is normal. Estimated EF = 65%.  · The cardiac valves are anatomically and functionally normal.          I have reviewed the medications:  Scheduled Meds:  allopurinol 300 mg Oral Daily   atorvastatin 10 mg Oral Nightly   bisacodyl 10 mg Oral Daily   carvedilol 12.5 mg Oral BID With Meals   docusate sodium 100 mg Oral BID   ferrous sulfate 325 mg Oral Every Other Day   hydrALAZINE 50 mg Oral TID   insulin lispro 0-7 Units Subcutaneous TID With Meals   lisinopril 20 mg Oral Daily   methylnaltrexone 4 mg Subcutaneous Every Other Day   metoclopramide 10 mg Intravenous Q6H   metroNIDAZOLE 500 mg Intravenous Q8H   pantoprazole 40 mg Oral Q AM   piperacillin-tazobactam 4.5 g Intravenous Q8H   sodium chloride 10 mL Intravenous Q12H     Continuous Infusions:  HYDROmorphone HCl-NaCl     lactated ringers 150 mL/hr Last Rate: 150 mL/hr (04/22/20 1040)     PRN Meds:.•  acetaminophen **OR** acetaminophen **OR** acetaminophen  •  bisacodyl  •  dextrose  •  dextrose  •  diphenhydrAMINE  •  gabapentin  •  glucagon (human recombinant)  •  HYDROcodone-acetaminophen  •  HYDROmorphone  •   ipratropium-albuterol  •  LORazepam **OR** LORazepam **OR** LORazepam **OR** LORazepam **OR** LORazepam **OR** LORazepam  •  naloxone  •  nicotine  •  ondansetron **OR** ondansetron  •  promethazine  •  sennosides-docusate  •  simethicone  •  sodium chloride  •  sodium chloride    Assessment/Plan   Assessment & Plan     Active Hospital Problems    Diagnosis  POA   • **Perforated bowel (CMS/HCC) [K63.1]  Yes   • Chronic pain [G89.29]  Yes   • Chronic alcohol abuse [F10.10]  Yes   • Hypoxia [R09.02]  Yes   • Dependent edema [R60.9]  Yes   • Tobacco abuse [Z72.0]  Yes   • Primary osteoarthritis of both hips [M16.0]  Yes   • Type 2 diabetes mellitus with complication, without long-term current use of insulin (CMS/HCC) [E11.8]  Yes      Resolved Hospital Problems   No resolved problems to display.        Brief Hospital Course to date:  Damion Llanos is a 64 y.o. male with Hx DM2, HTN, gout, chronic pain with opiates, who presented with acute abdominal pain and unrevealing CT of the abd who worsened overnight and had repeat imaging with free air now s/p emergent laparotomy with perforated bowel     Assessment/Plan     Perforated cecum s/p exploratory laparotomy  - s/p ex-lap with RT hemicolectomy with end ileostomy and mucous fistula 4/22/20 with Dr. Nugent  - cont pain control  - has NG tube in place, monitor ostomy output/bowel function  - cont IVFs     Acute hypoxic respiratory insufficieny due to hypoventilation  Chronic tobacco abuse  - discrepancy in the chart regarding Hx COPD or not  - PRN nebs  - NRT  - denied new cough, sputum production, fevers, sick contacts  - if still requiring O2 over next day or two consider CXR     Mild hypovolemic hyponatremia vs beer potomania  - slight improvement with IVFs, monitor     Chronic EtOH use with dependence  - drinks 6-7 beers/night for years  - denies hx of withdrawal, though noncommittal regarding if he has quit for 3 or more days in recent years  - CIWA scoring and PRN  ativan; if he develops symptoms start scheduled meds     DM type 2, self reported recent a1c <7%  - on metformin only; accuchecks with SSI     Chronic venous stasis  - on lasix outpatient for edema, recently started using compression stockings which have helped  - holding diuretics while volume resuscitating     DVT prophylaxis:  mechanical    Disposition: I expect the patient to be discharged TBD pending post-operative course and improvement in bowel function.    CODE STATUS:   Code Status and Medical Interventions:   Ordered at: 04/21/20 1443     Code Status:    CPR     Medical Interventions (Level of Support Prior to Arrest):    Full     Comments:    Prior         Electronically signed by Cornelius Xavier DO, 04/22/20, 14:26.

## 2020-04-22 NOTE — PLAN OF CARE
Pt admitted for unspecific abdominal pain. PRN dilaudid and norco given and patient was able to sleep for a few hours. Pain increased and was no longer resolved with PRN medications. Provider notified, repeat CT ordered. CT was abnormal, I was advised to stat page Dr. Nugent. Dr. Nugent returned the page, reviewed the case and verbal orders given. Patient is on his way to OR for emergency exploratory laparotomy. Current plan is for patient to return to room 516.

## 2020-04-22 NOTE — ANESTHESIA PROCEDURE NOTES
Airway  Urgency: elective    Date/Time: 4/22/2020 7:14 AM  Airway not difficult    General Information and Staff    Patient location during procedure: OR    Indications and Patient Condition  Indications for airway management: airway protection    Preoxygenated: yes  Mask difficulty assessment: 0 - not attempted    Final Airway Details  Final airway type: endotracheal airway      Successful airway: ETT  Cuffed: yes   Successful intubation technique: direct laryngoscopy  Facilitating devices/methods: intubating stylet  Endotracheal tube insertion site: oral  Blade: Salc[place]  Blade size: 2  ETT size (mm): 7.5  Cormack-Lehane Classification: grade I - full view of glottis  Placement verified by: chest auscultation and capnometry   Measured from: lips  ETT/EBT  to lips (cm): 22  Number of attempts at approach: 1  Assessment: lips, teeth, and gum same as pre-op and atraumatic intubation

## 2020-04-22 NOTE — SIGNIFICANT NOTE
"RN called reporting that pt w/ increased abdominal pain this morning despite PRN Dilaudid q 2 hours. VS stable/unchanged.   On exam- abdomin is full/distended, hypoactive BS, TTP- generalized. Pt denies nausea, vomiting. Last BM was yesterday morning. Pt rating pain 8/10 after Dilaudid.  STAT CT abd/pel without contrast ordered  Additional IV Dilaudid 0.5mg x 1 given  Pt updated on POC     UPDATE (05:30)  -radiologist called w/ critical findings on CT abd/pel- \"moderate amount of free intraperitoneal air compatible with a bowel perforation\"  -STAT blood cultures, type/screen and coags ordered; cbc, bmp, mag and lactic pending   -IV Zosyn initiated   -current VS: temp 99.1, HR 74, /70, 94% on 4 liters   -RN paged Dr. Nugent; updated on changes/CT findings- Dr. Nugent will see this am  -pt updated on POC   "

## 2020-04-22 NOTE — PROCEDURES
Critical Care  Performed by: Lisa Denis MD  Authorized by: Lisa Denis MD   Total critical care time: 30 minutes  Critical care time was exclusive of separately billable procedures and treating other patients.  Critical care was necessary to treat or prevent imminent or life-threatening deterioration of the following conditions: sepsis.  Critical care was time spent personally by me on the following activities: blood draw for specimens, development of treatment plan with patient or surrogate, examination of patient, obtaining history from patient or surrogate, ordering and performing treatments and interventions, ordering and review of laboratory studies, ordering and review of radiographic studies, pulse oximetry and review of old charts.      63 y/o male admitted for abd pain 4/21 w/ evaluation by Dr. Nugnet in ED with initially non-revealing CT in ER who has developed worsening abd pain.  Tiffanie Dsouza, hospitalist MIKAYLA notified and after examination ordered CT.  Pt has also had increasing oxygen requirements which were not reported but appear to be related to splinting and pain meds.  CT w/ bowel perforation.  Dr. Nugent notified by nursing as we were re-examining pt who has surgical abdomen w/ rebound and guarding diffusely; tense abdomen.  Vitals obtained by myself and Tiffanie w/ 's/70's, temp 99.1, HR 70's and 94% on 4L.  Pt has LR ordered but for unclear reasons are not being given.  Zosyn, LR, coags, cultures, type and screen ordered.  All of above including plan was d/w pt at bedside.

## 2020-04-23 ENCOUNTER — APPOINTMENT (OUTPATIENT)
Dept: GENERAL RADIOLOGY | Facility: HOSPITAL | Age: 65
End: 2020-04-23

## 2020-04-23 LAB
ANION GAP SERPL CALCULATED.3IONS-SCNC: 9 MMOL/L (ref 5–15)
BASOPHILS # BLD AUTO: 0.01 10*3/MM3 (ref 0–0.2)
BASOPHILS NFR BLD AUTO: 0.1 % (ref 0–1.5)
BUN BLD-MCNC: 13 MG/DL (ref 8–23)
BUN/CREAT SERPL: 24.5 (ref 7–25)
CALCIUM SPEC-SCNC: 9 MG/DL (ref 8.6–10.5)
CHLORIDE SERPL-SCNC: 95 MMOL/L (ref 98–107)
CO2 SERPL-SCNC: 26 MMOL/L (ref 22–29)
CREAT BLD-MCNC: 0.53 MG/DL (ref 0.76–1.27)
CYTO UR: NORMAL
DEPRECATED RDW RBC AUTO: 51.1 FL (ref 37–54)
EOSINOPHIL # BLD AUTO: 0 10*3/MM3 (ref 0–0.4)
EOSINOPHIL NFR BLD AUTO: 0 % (ref 0.3–6.2)
ERYTHROCYTE [DISTWIDTH] IN BLOOD BY AUTOMATED COUNT: 13.8 % (ref 12.3–15.4)
GFR SERPL CREATININE-BSD FRML MDRD: >150 ML/MIN/1.73
GLUCOSE BLD-MCNC: 134 MG/DL (ref 65–99)
GLUCOSE BLDC GLUCOMTR-MCNC: 124 MG/DL (ref 70–130)
GLUCOSE BLDC GLUCOMTR-MCNC: 135 MG/DL (ref 70–130)
GLUCOSE BLDC GLUCOMTR-MCNC: 149 MG/DL (ref 70–130)
HCT VFR BLD AUTO: 35 % (ref 37.5–51)
HGB BLD-MCNC: 10.9 G/DL (ref 13–17.7)
IMM GRANULOCYTES # BLD AUTO: 0.04 10*3/MM3 (ref 0–0.05)
IMM GRANULOCYTES NFR BLD AUTO: 0.5 % (ref 0–0.5)
LAB AP CASE REPORT: NORMAL
LAB AP CLINICAL INFORMATION: NORMAL
LYMPHOCYTES # BLD AUTO: 0.56 10*3/MM3 (ref 0.7–3.1)
LYMPHOCYTES NFR BLD AUTO: 6.3 % (ref 19.6–45.3)
MAGNESIUM SERPL-MCNC: 1.9 MG/DL (ref 1.6–2.4)
MCH RBC QN AUTO: 31.6 PG (ref 26.6–33)
MCHC RBC AUTO-ENTMCNC: 31.1 G/DL (ref 31.5–35.7)
MCV RBC AUTO: 101.4 FL (ref 79–97)
MONOCYTES # BLD AUTO: 0.72 10*3/MM3 (ref 0.1–0.9)
MONOCYTES NFR BLD AUTO: 8.1 % (ref 5–12)
NEUTROPHILS # BLD AUTO: 7.53 10*3/MM3 (ref 1.7–7)
NEUTROPHILS NFR BLD AUTO: 85 % (ref 42.7–76)
NRBC BLD AUTO-RTO: 0 /100 WBC (ref 0–0.2)
PATH REPORT.FINAL DX SPEC: NORMAL
PATH REPORT.GROSS SPEC: NORMAL
PLATELET # BLD AUTO: 212 10*3/MM3 (ref 140–450)
PMV BLD AUTO: 10.8 FL (ref 6–12)
POTASSIUM BLD-SCNC: 4.2 MMOL/L (ref 3.5–5.2)
RBC # BLD AUTO: 3.45 10*6/MM3 (ref 4.14–5.8)
SODIUM BLD-SCNC: 130 MMOL/L (ref 136–145)
WBC NRBC COR # BLD: 8.86 10*3/MM3 (ref 3.4–10.8)

## 2020-04-23 PROCEDURE — 25010000002 LORAZEPAM PER 2 MG: Performed by: FAMILY MEDICINE

## 2020-04-23 PROCEDURE — 99233 SBSQ HOSP IP/OBS HIGH 50: CPT | Performed by: FAMILY MEDICINE

## 2020-04-23 PROCEDURE — 85025 COMPLETE CBC W/AUTO DIFF WBC: CPT | Performed by: SURGERY

## 2020-04-23 PROCEDURE — 82962 GLUCOSE BLOOD TEST: CPT

## 2020-04-23 PROCEDURE — 94799 UNLISTED PULMONARY SVC/PX: CPT

## 2020-04-23 PROCEDURE — 25010000002 HYDROMORPHONE PER 4 MG: Performed by: SURGERY

## 2020-04-23 PROCEDURE — 80048 BASIC METABOLIC PNL TOTAL CA: CPT | Performed by: SURGERY

## 2020-04-23 PROCEDURE — 25010000002 METOCLOPRAMIDE PER 10 MG: Performed by: SURGERY

## 2020-04-23 PROCEDURE — 71045 X-RAY EXAM CHEST 1 VIEW: CPT

## 2020-04-23 PROCEDURE — 25010000002 PIPERACILLIN SOD-TAZOBACTAM PER 1 G: Performed by: SURGERY

## 2020-04-23 PROCEDURE — 83735 ASSAY OF MAGNESIUM: CPT

## 2020-04-23 PROCEDURE — 94640 AIRWAY INHALATION TREATMENT: CPT

## 2020-04-23 RX ORDER — LORAZEPAM 2 MG/ML
0.5 INJECTION INTRAMUSCULAR EVERY 6 HOURS
Status: DISPENSED | OUTPATIENT
Start: 2020-04-23 | End: 2020-04-24

## 2020-04-23 RX ORDER — LORAZEPAM 2 MG/ML
0.5 INJECTION INTRAMUSCULAR EVERY 8 HOURS
Status: COMPLETED | OUTPATIENT
Start: 2020-04-24 | End: 2020-04-25

## 2020-04-23 RX ADMIN — HYDROMORPHONE HYDROCHLORIDE 0.5 MG: 1 INJECTION, SOLUTION INTRAMUSCULAR; INTRAVENOUS; SUBCUTANEOUS at 03:46

## 2020-04-23 RX ADMIN — SODIUM CHLORIDE, PRESERVATIVE FREE 10 ML: 5 INJECTION INTRAVENOUS at 09:01

## 2020-04-23 RX ADMIN — BISACODYL 10 MG: 5 TABLET, COATED ORAL at 09:00

## 2020-04-23 RX ADMIN — IPRATROPIUM BROMIDE AND ALBUTEROL SULFATE 3 ML: 2.5; .5 SOLUTION RESPIRATORY (INHALATION) at 19:42

## 2020-04-23 RX ADMIN — METRONIDAZOLE 500 MG: 500 INJECTION, SOLUTION INTRAVENOUS at 09:01

## 2020-04-23 RX ADMIN — METOCLOPRAMIDE 10 MG: 5 INJECTION, SOLUTION INTRAMUSCULAR; INTRAVENOUS at 09:00

## 2020-04-23 RX ADMIN — METOCLOPRAMIDE 10 MG: 5 INJECTION, SOLUTION INTRAMUSCULAR; INTRAVENOUS at 17:53

## 2020-04-23 RX ADMIN — HYDRALAZINE HYDROCHLORIDE 50 MG: 50 TABLET, FILM COATED ORAL at 17:53

## 2020-04-23 RX ADMIN — METOCLOPRAMIDE 10 MG: 5 INJECTION, SOLUTION INTRAMUSCULAR; INTRAVENOUS at 03:35

## 2020-04-23 RX ADMIN — FERROUS SULFATE TAB 325 MG (65 MG ELEMENTAL FE) 325 MG: 325 (65 FE) TAB at 09:00

## 2020-04-23 RX ADMIN — ATORVASTATIN CALCIUM 10 MG: 10 TABLET, FILM COATED ORAL at 20:37

## 2020-04-23 RX ADMIN — PANTOPRAZOLE SODIUM 40 MG: 40 TABLET, DELAYED RELEASE ORAL at 05:21

## 2020-04-23 RX ADMIN — TAZOBACTAM SODIUM AND PIPERACILLIN SODIUM 4.5 G: 500; 4 INJECTION, SOLUTION INTRAVENOUS at 22:52

## 2020-04-23 RX ADMIN — CARVEDILOL 12.5 MG: 12.5 TABLET, FILM COATED ORAL at 09:00

## 2020-04-23 RX ADMIN — TAZOBACTAM SODIUM AND PIPERACILLIN SODIUM 4.5 G: 500; 4 INJECTION, SOLUTION INTRAVENOUS at 05:21

## 2020-04-23 RX ADMIN — LORAZEPAM 0.5 MG: 2 INJECTION INTRAMUSCULAR; INTRAVENOUS at 17:53

## 2020-04-23 RX ADMIN — TAZOBACTAM SODIUM AND PIPERACILLIN SODIUM 4.5 G: 500; 4 INJECTION, SOLUTION INTRAVENOUS at 15:23

## 2020-04-23 RX ADMIN — IPRATROPIUM BROMIDE AND ALBUTEROL SULFATE 3 ML: 2.5; .5 SOLUTION RESPIRATORY (INHALATION) at 09:13

## 2020-04-23 RX ADMIN — HYDROCODONE BITARTRATE AND ACETAMINOPHEN 1 TABLET: 10; 325 TABLET ORAL at 06:51

## 2020-04-23 RX ADMIN — SODIUM CHLORIDE, POTASSIUM CHLORIDE, SODIUM LACTATE AND CALCIUM CHLORIDE 150 ML/HR: 600; 310; 30; 20 INJECTION, SOLUTION INTRAVENOUS at 05:31

## 2020-04-23 RX ADMIN — METOCLOPRAMIDE 10 MG: 5 INJECTION, SOLUTION INTRAMUSCULAR; INTRAVENOUS at 23:45

## 2020-04-23 RX ADMIN — LISINOPRIL 20 MG: 20 TABLET ORAL at 09:00

## 2020-04-23 RX ADMIN — ALLOPURINOL 300 MG: 300 TABLET ORAL at 09:00

## 2020-04-23 RX ADMIN — HYDRALAZINE HYDROCHLORIDE 50 MG: 50 TABLET, FILM COATED ORAL at 20:37

## 2020-04-23 RX ADMIN — DOCUSATE SODIUM 100 MG: 100 CAPSULE ORAL at 09:00

## 2020-04-23 RX ADMIN — CARVEDILOL 12.5 MG: 12.5 TABLET, FILM COATED ORAL at 17:53

## 2020-04-23 RX ADMIN — HYDRALAZINE HYDROCHLORIDE 50 MG: 50 TABLET, FILM COATED ORAL at 09:00

## 2020-04-23 RX ADMIN — SODIUM CHLORIDE, PRESERVATIVE FREE 10 ML: 5 INJECTION INTRAVENOUS at 20:38

## 2020-04-23 RX ADMIN — DOCUSATE SODIUM 100 MG: 100 CAPSULE ORAL at 20:37

## 2020-04-23 NOTE — NURSING NOTE
[facility] nurse consult for new Ileostomy with mucous fistula by Dr Nugent done 4/22/20.  Dropped off education folder, patient was just finishing his breathing treatment and in pain from coughing.  Stoma is red, moist and viable, serosangious liquid drainage in pouch, has NGT, only sips and chips, [place] #[id number] intact.  Patient may need convexity or Coloplast flip appliance.  Patient did look at stoma.  He was not interested in education at this time he stated I just had surgery.  [facility] nurse to continue to follow and educate.

## 2020-04-23 NOTE — PLAN OF CARE
Problem: Fall Risk (Adult)  Goal: Identify Related Risk Factors and Signs and Symptoms  Outcome: Ongoing (interventions implemented as appropriate)  Flowsheets (Taken 4/23/2020 0259)  Related Risk Factors (Fall Risk): age-related changes; polypharmacy; environment unfamiliar     Problem: Fall Risk (Adult)  Goal: Absence of Fall  Outcome: Ongoing (interventions implemented as appropriate)  Flowsheets (Taken 4/23/2020 0259)  Absence of Fall: making progress toward outcome     Problem: Fall Risk (Adult)  Goal: Identify Related Risk Factors and Signs and Symptoms  Outcome: Ongoing (interventions implemented as appropriate)  Flowsheets (Taken 4/23/2020 0259)  Related Risk Factors (Fall Risk): age-related changes; polypharmacy; environment unfamiliar     Problem: Fall Risk (Adult)  Goal: Absence of Fall  Outcome: Ongoing (interventions implemented as appropriate)  Flowsheets (Taken 4/23/2020 0259)  Absence of Fall: making progress toward outcome     Problem: Patient Care Overview  Goal: Plan of Care Review  Outcome: Ongoing (interventions implemented as appropriate)  Flowsheets (Taken 4/23/2020 0259)  Progress: no change  Plan of Care Reviewed With: patient  Outcome Summary: Pt has complained of frequent abdominal pain but has had adequate relief with the ordered PRN medications. VSS. Pt awake on and off throughout the shift. Good UOP from ramírez. Will continue to monitor.     Problem: Patient Care Overview  Goal: Individualization and Mutuality  Outcome: Ongoing (interventions implemented as appropriate)     Problem: Patient Care Overview  Goal: Discharge Needs Assessment  Outcome: Ongoing (interventions implemented as appropriate)     Problem: Patient Care Overview  Goal: Interprofessional Rounds/Family Conf  Outcome: Ongoing (interventions implemented as appropriate)     Problem: Pain, Acute (Adult)  Goal: Identify Related Risk Factors and Signs and Symptoms  Outcome: Ongoing (interventions implemented as  appropriate)  Flowsheets (Taken 4/23/2020 0259)  Related Risk Factors (Acute Pain): surgery; infection; patient perception  Signs and Symptoms (Acute Pain): verbalization of pain descriptors; guarding/abnormal posturing/positioning; questions meaning of pain     Problem: Pain, Acute (Adult)  Goal: Acceptable Pain Control/Comfort Level  Outcome: Ongoing (interventions implemented as appropriate)  Flowsheets (Taken 4/23/2020 0259)  Acceptable Pain Control/Comfort Level: making progress toward outcome     Problem: Pain, Chronic (Adult)  Goal: Identify Related Risk Factors and Signs and Symptoms  Outcome: Ongoing (interventions implemented as appropriate)  Flowsheets (Taken 4/23/2020 0259)  Related Risk Factors (Chronic Pain): disease process; surgery; knowledge deficit  Signs and Symptoms (Chronic Pain): questions meaning of pain; pacing/restlessness; fatigue/weakness; abnormal posturing/positioning     Problem: Pain, Chronic (Adult)  Goal: Acceptable Pain/Comfort Level and Functional Ability  Outcome: Ongoing (interventions implemented as appropriate)  Flowsheets (Taken 4/23/2020 0259)  Acceptable Pain/Comfort Level and Functional Ability: making progress toward outcome

## 2020-04-23 NOTE — PROGRESS NOTES
"Patient Name:  Damion Llanos  YOB: 1955  [id number]    Surgery Progress Note    Date of visit: 4/23/2020    Subjective   Subjective: Complains of soreness, otherwise doing well.         Objective     Objective:     /73 (BP Location: Right arm, Patient Position: Lying)   Pulse 79   Temp 99.5 °F (37.5 °C) (Oral)   Resp 18   Ht 172.7 cm (68\")   Wt 108 kg (237 lb)   SpO2 94%   BMI 36.04 kg/m²     Intake/Output Summary (Last 24 hours) at 4/23/2020 0726  Last data filed at 4/23/2020 0549  Gross per 24 hour   Intake 2882.7 ml   Output 2390 ml   Net 492.7 ml       CV:  Rhythm  regular and rate regular   L:  Mild rhonchi to auscultation bilaterally   Abd:  Bowel sounds hypoactive, soft, appropriately tender. Dressings c/d/i. Ostomy pink, viable. DAISY serosanguinous  Ext:  No cyanosis, clubbing, edema    Recent labs that are back at this time have been reviewed.        Assessment/Plan     Assessment/ Plan:    Hospital Problem List     * (Principal) Perforated bowel (CMS/HCC) - Doing well after R colectomy with ileostomy. D/C [place]. Up in chair. Keep NG one more day. Begin dressing changes/ wound ostomy eval.    Tobacco abuse    Primary osteoarthritis of both hips    Type 2 diabetes mellitus with complication, without long-term current use of insulin (CMS/HCC)        Dependent edema    Chronic pain    Chronic alcohol abuse- Continue withdrawal protocol.        Hypoxia              El Nugent MD  4/23/2020  07:26      "

## 2020-04-23 NOTE — PROGRESS NOTES
Continued Stay Note  [facility]     Patient Name: Damion Llanos  MRN: [id number]  Today's Date: 4/23/2020    Admit Date: 4/21/2020    Discharge Plan     Row Name 04/23/20 1152       Plan    Plan  home with home health    Patient/Family in Agreement with Plan  yes    Plan Comments  I met with Mr. Llanos at the bedside to discuss his discharge plan. He lives alone in [facility] and plans on returning home after this admission. He has a ileostomy and he is agreeable to home health care after this admission to resume education for stoma care and help with supplies. He would like to use [facility]. Case management will follow his progress and make arrangements prior to discharge. He still has an NG to suction.         Discharge Codes    No documentation.             Bry Schreiber RN

## 2020-04-24 LAB
ANION GAP SERPL CALCULATED.3IONS-SCNC: 9 MMOL/L (ref 5–15)
BUN BLD-MCNC: 10 MG/DL (ref 8–23)
BUN/CREAT SERPL: 18.5 (ref 7–25)
CALCIUM SPEC-SCNC: 9.5 MG/DL (ref 8.6–10.5)
CHLORIDE SERPL-SCNC: 97 MMOL/L (ref 98–107)
CO2 SERPL-SCNC: 28 MMOL/L (ref 22–29)
CREAT BLD-MCNC: 0.54 MG/DL (ref 0.76–1.27)
DEPRECATED RDW RBC AUTO: 50.5 FL (ref 37–54)
ERYTHROCYTE [DISTWIDTH] IN BLOOD BY AUTOMATED COUNT: 13.6 % (ref 12.3–15.4)
GFR SERPL CREATININE-BSD FRML MDRD: >150 ML/MIN/1.73
GLUCOSE BLD-MCNC: 105 MG/DL (ref 65–99)
GLUCOSE BLDC GLUCOMTR-MCNC: 110 MG/DL (ref 70–130)
GLUCOSE BLDC GLUCOMTR-MCNC: 131 MG/DL (ref 70–130)
GLUCOSE BLDC GLUCOMTR-MCNC: 168 MG/DL (ref 70–130)
GLUCOSE BLDC GLUCOMTR-MCNC: 186 MG/DL (ref 70–130)
HCT VFR BLD AUTO: 33.6 % (ref 37.5–51)
HGB BLD-MCNC: 10.6 G/DL (ref 13–17.7)
MCH RBC QN AUTO: 31.8 PG (ref 26.6–33)
MCHC RBC AUTO-ENTMCNC: 31.5 G/DL (ref 31.5–35.7)
MCV RBC AUTO: 100.9 FL (ref 79–97)
PLATELET # BLD AUTO: 239 10*3/MM3 (ref 140–450)
PMV BLD AUTO: 10.3 FL (ref 6–12)
POTASSIUM BLD-SCNC: 4.1 MMOL/L (ref 3.5–5.2)
RBC # BLD AUTO: 3.33 10*6/MM3 (ref 4.14–5.8)
SODIUM BLD-SCNC: 134 MMOL/L (ref 136–145)
WBC NRBC COR # BLD: 10.63 10*3/MM3 (ref 3.4–10.8)

## 2020-04-24 PROCEDURE — 94799 UNLISTED PULMONARY SVC/PX: CPT

## 2020-04-24 PROCEDURE — 25010000002 METHYLNALTREXONE 12 MG/0.6ML SOLUTION: Performed by: SURGERY

## 2020-04-24 PROCEDURE — 25010000002 METOCLOPRAMIDE PER 10 MG: Performed by: SURGERY

## 2020-04-24 PROCEDURE — 25010000002 PIPERACILLIN SOD-TAZOBACTAM PER 1 G: Performed by: SURGERY

## 2020-04-24 PROCEDURE — 85027 COMPLETE CBC AUTOMATED: CPT | Performed by: SURGERY

## 2020-04-24 PROCEDURE — 80048 BASIC METABOLIC PNL TOTAL CA: CPT | Performed by: SURGERY

## 2020-04-24 PROCEDURE — 99232 SBSQ HOSP IP/OBS MODERATE 35: CPT | Performed by: FAMILY MEDICINE

## 2020-04-24 PROCEDURE — 25010000002 LORAZEPAM PER 2 MG: Performed by: FAMILY MEDICINE

## 2020-04-24 PROCEDURE — 82962 GLUCOSE BLOOD TEST: CPT

## 2020-04-24 RX ORDER — FUROSEMIDE 40 MG/1
40 TABLET ORAL DAILY
Status: DISCONTINUED | OUTPATIENT
Start: 2020-04-25 | End: 2020-04-27 | Stop reason: HOSPADM

## 2020-04-24 RX ORDER — IPRATROPIUM BROMIDE AND ALBUTEROL SULFATE 2.5; .5 MG/3ML; MG/3ML
3 SOLUTION RESPIRATORY (INHALATION)
Status: DISCONTINUED | OUTPATIENT
Start: 2020-04-24 | End: 2020-04-27 | Stop reason: HOSPADM

## 2020-04-24 RX ADMIN — ALLOPURINOL 300 MG: 300 TABLET ORAL at 08:50

## 2020-04-24 RX ADMIN — TAZOBACTAM SODIUM AND PIPERACILLIN SODIUM 4.5 G: 500; 4 INJECTION, SOLUTION INTRAVENOUS at 05:50

## 2020-04-24 RX ADMIN — IPRATROPIUM BROMIDE AND ALBUTEROL SULFATE 3 ML: 2.5; .5 SOLUTION RESPIRATORY (INHALATION) at 03:54

## 2020-04-24 RX ADMIN — METOCLOPRAMIDE 10 MG: 5 INJECTION, SOLUTION INTRAMUSCULAR; INTRAVENOUS at 03:17

## 2020-04-24 RX ADMIN — IPRATROPIUM BROMIDE AND ALBUTEROL SULFATE 3 ML: 2.5; .5 SOLUTION RESPIRATORY (INHALATION) at 16:13

## 2020-04-24 RX ADMIN — PANTOPRAZOLE SODIUM 40 MG: 40 TABLET, DELAYED RELEASE ORAL at 05:50

## 2020-04-24 RX ADMIN — DOCUSATE SODIUM 100 MG: 100 CAPSULE ORAL at 20:23

## 2020-04-24 RX ADMIN — LORAZEPAM 0.5 MG: 2 INJECTION INTRAMUSCULAR; INTRAVENOUS at 04:04

## 2020-04-24 RX ADMIN — TAZOBACTAM SODIUM AND PIPERACILLIN SODIUM 4.5 G: 500; 4 INJECTION, SOLUTION INTRAVENOUS at 14:04

## 2020-04-24 RX ADMIN — CARVEDILOL 12.5 MG: 12.5 TABLET, FILM COATED ORAL at 17:38

## 2020-04-24 RX ADMIN — LORAZEPAM 0.5 MG: 2 INJECTION INTRAMUSCULAR; INTRAVENOUS at 23:18

## 2020-04-24 RX ADMIN — METOCLOPRAMIDE 10 MG: 5 INJECTION, SOLUTION INTRAMUSCULAR; INTRAVENOUS at 21:52

## 2020-04-24 RX ADMIN — HYDRALAZINE HYDROCHLORIDE 50 MG: 50 TABLET, FILM COATED ORAL at 17:38

## 2020-04-24 RX ADMIN — CARVEDILOL 12.5 MG: 12.5 TABLET, FILM COATED ORAL at 08:50

## 2020-04-24 RX ADMIN — IPRATROPIUM BROMIDE AND ALBUTEROL SULFATE 3 ML: 2.5; .5 SOLUTION RESPIRATORY (INHALATION) at 12:13

## 2020-04-24 RX ADMIN — ATORVASTATIN CALCIUM 10 MG: 10 TABLET, FILM COATED ORAL at 20:23

## 2020-04-24 RX ADMIN — LORAZEPAM 0.5 MG: 2 INJECTION INTRAMUSCULAR; INTRAVENOUS at 08:50

## 2020-04-24 RX ADMIN — SODIUM CHLORIDE, PRESERVATIVE FREE 10 ML: 5 INJECTION INTRAVENOUS at 20:24

## 2020-04-24 RX ADMIN — LISINOPRIL 20 MG: 20 TABLET ORAL at 08:50

## 2020-04-24 RX ADMIN — HYDROCODONE BITARTRATE AND ACETAMINOPHEN 1 TABLET: 10; 325 TABLET ORAL at 08:54

## 2020-04-24 RX ADMIN — HYDRALAZINE HYDROCHLORIDE 50 MG: 50 TABLET, FILM COATED ORAL at 20:23

## 2020-04-24 RX ADMIN — METOCLOPRAMIDE 10 MG: 5 INJECTION, SOLUTION INTRAMUSCULAR; INTRAVENOUS at 08:50

## 2020-04-24 RX ADMIN — DOCUSATE SODIUM 100 MG: 100 CAPSULE ORAL at 08:50

## 2020-04-24 RX ADMIN — METHYLNALTREXONE BROMIDE 4 MG: 12 INJECTION, SOLUTION SUBCUTANEOUS at 08:50

## 2020-04-24 RX ADMIN — TAZOBACTAM SODIUM AND PIPERACILLIN SODIUM 4.5 G: 500; 4 INJECTION, SOLUTION INTRAVENOUS at 21:52

## 2020-04-24 RX ADMIN — BISACODYL 10 MG: 5 TABLET, COATED ORAL at 08:54

## 2020-04-24 RX ADMIN — HYDRALAZINE HYDROCHLORIDE 50 MG: 50 TABLET, FILM COATED ORAL at 08:50

## 2020-04-24 RX ADMIN — LORAZEPAM 0.5 MG: 2 INJECTION INTRAMUSCULAR; INTRAVENOUS at 14:06

## 2020-04-24 RX ADMIN — METOCLOPRAMIDE 10 MG: 5 INJECTION, SOLUTION INTRAMUSCULAR; INTRAVENOUS at 17:38

## 2020-04-24 RX ADMIN — HYDROCODONE BITARTRATE AND ACETAMINOPHEN 1 TABLET: 10; 325 TABLET ORAL at 17:38

## 2020-04-24 NOTE — CONSULTS
Referring Provider: DO Newton  Reason for Consultation: COPD/ Smoking Cessation    Subjective .   Education:  NN spoke with pt at BS.  Pt alert and able to answer questions appropriately.  Pt O2 sat    94% on2   L currently, home O2 use RA.  Smoking cessation education completed. Cessation support resources discussed with pt.  Pt reports that he is up to date on his flu and PNA vaccine, however there is no documentation currently in chart.  Pt Is drowsy at time of interview and is unable to clearly report some answers.  I did inquire about an order for PFT which was placed about 2 years ago and he states that he had not gotten the test done due to his wife being sick and passing away in November.  Pt reports that he has no issues at this time with medications or appointments.  He is unable to report his smoking hx, his use of rescue inhaler or his ambulatory distance.  Pt reports no previous hx of formal COPD teaching, no understanding of action plan, or IA.  COPD education completed in the form of explanation, handouts, and videos.  NN contact information, instructions for accessing [facility] and list of educational videos given to pt.No new concerns or questions voiced at this time.  NN will continue to follow as needed.    Age: 64 y.o.  Sex: male  Smoker Status: current, pack years unclear  Pulmonologist: NA  FEV1 (PFT): NA  Home O2: RA    Objective     SpO2 SpO2: 95 % (04/24/20 1213)  Device Device (Oxygen Therapy): nasal cannula (04/24/20 1213)  Flow Flow (L/min): 3 (04/24/20 1213)  Incentive Spirometer $ Incentive Spirometry: yes (04/22/20 1000)  IS Predicted Level (mL) Incentive Spirometer Predicted Level (mL): 1000 (04/22/20 2000)   Number of Repetitions Number of Repetitions (IS): 5 (04/22/20 2000)   Level Incentive Spirometer (mL) Level Incentive Spirometer (mL): 750 (04/22/20 2000)  Patient Tolerance Patient Tolerance (IS): fair (04/22/20 2000)   Inhaler Treatment Status    Treatment Route        Home  Medications:  Medications Prior to Admission   Medication Sig Dispense Refill Last Dose   • albuterol (PROVENTIL HFA;VENTOLIN HFA) 108 (90 Base) MCG/ACT inhaler Inhale 2 puffs Every 4 (Four) Hours As Needed for Wheezing.      • allopurinol (ZYLOPRIM) 300 MG tablet Take 300 mg by mouth Daily.      • carvedilol (COREG) 12.5 MG tablet Take 12.5 mg by mouth 2 (Two) Times a Day With Meals.   12/16/2017   • ferrous sulfate 325 (65 FE) MG tablet Take 325 mg by mouth Every Other Day.      • furosemide (LASIX) 40 MG tablet Take 40 mg by mouth 2 (Two) Times a Day.   12/16/2017   • gabapentin (NEURONTIN) 300 MG capsule Take 300 mg by mouth 3 (Three) Times a Day.      • hydrALAZINE (APRESOLINE) 50 MG tablet Take 50 mg by mouth 3 (Three) Times a Day.   12/16/2017   • HYDROcodone-acetaminophen (NORCO)  MG per tablet Take 1 tablet by mouth Every 8 (Eight) Hours As Needed for Moderate Pain .   12/16/2017   • lisinopril (PRINIVIL,ZESTRIL) 40 MG tablet Take 20 mg by mouth Daily.   12/16/2017   • metFORMIN (GLUCOPHAGE) 500 MG tablet Take 500 mg by mouth 2 (Two) Times a Day With Meals.   12/16/2017   • pantoprazole (PROTONIX) 40 MG EC tablet Take 40 mg by mouth Daily.      • potassium chloride (K-DUR,KLOR-CON) 10 MEQ CR tablet Take 10 mEq by mouth 2 (Two) Times a Day As Needed.      • simvastatin (ZOCOR) 20 MG tablet Take 20 mg by mouth Every Night.   12/15/2017       Discussion: Per current GOLD Standards, please consider: No LAMA/LABA/ICS in place, NRT at NE, outpatient PFT          Elisa Eli RN

## 2020-04-24 NOTE — NURSING NOTE
[facility] nurse for ostomy education.  Patient falling asleep during visit, is not interested in ostomy care at this time.  Appliance intact.  Denies nausea, stated he ate jello.  Will continue to educate patient and change appliance in the next several days.

## 2020-04-24 NOTE — PROGRESS NOTES
"                  Clinical Nutrition   Nutrition Assessment  Reason for Visit:   NPO/Clear liquid x 3 days     Patient Name: Damion Llanos  YOB: 1955  MRN: [id number]  Date of Encounter: 04/24/20 12:03  Admission date: 4/21/2020    Nutrition Assessment   Assessment     Admission Diagnosis   Perforated bowel (CMS/HCC)  Dependent edema  Chronic pain   Hypoxia  + tobacco use- 1 ppd  + ETOH use- 7-8 beers daily     Additional applicable diagnosis/conditions/procedures this adm:  (4/22) s/p lap ex, R hemicolectomy, ileostomy, mucus fistula   (4/24) NGT removed     Applicable PMH/PSxH:   T2DM  HTN  HLD  GERD  Gout   Anemia   OA SHAWANDA hips     Reported/Observed/Food/Nutrition Related History:     Pt is NPO/CLD x 3 days. Pt had NGT removed today and has been advanced to clears as of this morning. He is tolerating sips at this time.     Anthropometrics     Height: 172.7 cm (68\")  Last filed wt: Weight: 108 kg (237 lb) (04/21/20 0923)  Weight Method: Stated    BMI: BMI (Calculated): 36  Obese Class II: 35-39.9kg/m2    Ideal Body Weight (IBW) (kg): 70.89    Labs reviewed   Yes;   Hyponatremia   Results from last 7 days   Lab Units 04/24/20  0412 04/23/20  0536 04/22/20  0358 04/21/20  0939   GLUCOSE mg/dL 105* 134* 126* 123*   BUN mg/dL 10 13 11 18   CREATININE mg/dL 0.54* 0.53* 0.53* 0.75*   SODIUM mmol/L 134* 130* 133* 131*   CHLORIDE mmol/L 97* 95* 95* 94*   POTASSIUM mmol/L 4.1 4.2 4.8 4.7   MAGNESIUM mg/dL  --  1.9 1.6  --    ALT (SGPT) U/L  --   --   --  18     Results from last 7 days   Lab Units 04/21/20  0939   ALBUMIN g/dL 4.30     Results from last 7 days   Lab Units 04/24/20  1130 04/24/20  0737 04/23/20  1724 04/23/20  1229 04/23/20  0814 04/22/20  1604   GLUCOSE mg/dL 186* 110 124 149* 135* 151*     Lab Results   Lab Value Date/Time    HGBA1C <4.2 (L) 12/16/2017 1306     Medications reviewed   Pertinent: dulcolax, colace, iron, insulin, ativan, reglan, protonix, IV abx  GTT: PCA dilaudid, IV LR's @ " 75 mL/hr  PRN: neurontin, zofran, phenergan     Current Nutrition Prescription     PO: Diet Clear Liquid; Small Feedings  No active supplement orders    Intake: Insufficient data     Nutrition Diagnosis   4/24  Problem Inadequate oral intake   Etiology Perforated bowel   Signs/Symptoms NPO/CLD x 3 days      Nutrition Intervention     1.  Follow treatment progress, Care plan reviewed  2. Encourage intake   3. Breeze TID    Goal:   General: Nutrition support treatment, Improve nutrition related labs  PO: Tolerate PO, Increase intake, Advance diet as medically appropriate     Monitoring/Evaluation:   Per protocol, I&O, PO intake, Supplement intake, Pertinent labs, GI status, Symptoms    Will Continue to follow per protocol    Carlee Adorno RD  Time Spent: 30 minutes

## 2020-04-24 NOTE — PROGRESS NOTES
Continued Stay Note  [facility]     Patient Name: Damion Llanos  MRN: [id number]  Today's Date: 4/24/2020    Admit Date: 4/21/2020    Discharge Plan     Row Name 04/24/20 0940       Plan    Plan  home with home health    Patient/Family in Agreement with Plan  yes    Plan Comments  I met with Mr. Llanos at the bedside to discuss his discharge plan. His NG has been removed and he has been started on a liquid diet. He still has a PCA infusion for pain. He will likely remain hospitalized throughout the weekend. He still plans on returning home after this admission and would like [facility] to follow him at discharge. I called a referral to [facility]. They will follow Mr. Llanos for skilled nursing. Case management will notify them of his discharge.    Final Discharge Disposition Code  06 - home with home health care        Discharge Codes    No documentation.             Bry Schreiber RN

## 2020-04-24 NOTE — PROGRESS NOTES
"Patient Name:  Damion Llanos  YOB: 1955  [id number]    Surgery Progress Note    Date of visit: 4/24/2020    Subjective   Subjective: Still sore, but feels a bit better.         Objective     Objective:     BP (!) 181/83 (BP Location: Right arm, Patient Position: Lying)   Pulse 76   Temp 99.1 °F (37.3 °C) (Oral)   Resp 20   Ht 172.7 cm (68\")   Wt 108 kg (237 lb)   SpO2 93%   BMI 36.04 kg/m²     Intake/Output Summary (Last 24 hours) at 4/24/2020 0829  Last data filed at 4/24/2020 0600  Gross per 24 hour   Intake 1200.1 ml   Output 1590 ml   Net -389.9 ml       CV:  Rhythm  regular and rate regular   L:  Mild rhonchi to auscultation bilaterally   Abd:  Bowel sounds hypoactive, soft, appropriately tender. Dressings c/d/i. Ostomy pink, viable. DAISY serous  Ext:  No cyanosis, clubbing, edema    Recent labs that are back at this time have been reviewed.        Assessment/Plan     Assessment/ Plan:    Hospital Problem List     * (Principal) Perforated bowel (CMS/HCC) - Slowly improving. D/C NG and start sips of clear liquids. Increase mobility. Continued ostomy teaching. Watch for EtOH withdrawal.      Tobacco abuse    Primary osteoarthritis of both hips    Type 2 diabetes mellitus with complication, without long-term current use of insulin (CMS/HCC)        Dependent edema    Chronic pain    Chronic alcohol abuse        Hypoxia              El Nugent MD  4/24/2020  08:29      "

## 2020-04-24 NOTE — PROGRESS NOTES
[facility] Medicine Services  PROGRESS NOTE    Patient Name: Damion Llanos  : 1955  MRN: [id number]    Date of Admission: 2020  Primary Care Physician: Konstantin Palm MD    Subjective   Subjective     CC:  Follow up abdominal pain    HPI:  Patient is sitting up in bed. He feels mildly short of breath. He has some pain in his abdomin.     Review of Systems  Gen- No fevers, chills  CV- No chest pain, palpitations  Resp- No cough, +dyspnea  GI- No N/V/D, Yes abd pain    Objective   Objective     Vital Signs:   Temp:  [98.4 °F (36.9 °C)-99.1 °F (37.3 °C)] 98.4 °F (36.9 °C)  Heart Rate:  [72-86] 72  Resp:  [18-20] 19  BP: (131-181)/(71-88) 152/86        Physical Exam  Constitutional: No acute distress, awake, alert, male   HENT: NCAT, mucous membranes moist  Respiratory: + expiratory wheezing bilaterally  , respiratory effort normal   Cardiovascular: RRR, no murmurs, rubs, or gallops, palpable pedal pulses bilaterally  Gastrointestinal: Soft, non distended, slightly tender, Ostomy with minimal bloody output  Musculoskeletal: No bilateral ankle edema  Psychiatric: Appropriate affect, cooperative  Neurologic: Oriented x 3, strength symmetric in all extremities, speech clear, tremor of hands   Skin: No rashes      Results Reviewed:  Results from last 7 days   Lab Units 20  0559 20  0358   WBC 10*3/mm3 10.63 8.86  --  14.91*   HEMOGLOBIN g/dL 10.6* 10.9*  --  12.1*   HEMATOCRIT % 33.6* 35.0*  --  38.1   PLATELETS 10*3/mm3 239 212  --  244   INR   --   --  1.28*  --      Results from last 7 days   Lab Units 20  0536 20  0358 20  0939   SODIUM mmol/L 134* 130* 133* 131*   POTASSIUM mmol/L 4.1 4.2 4.8 4.7   CHLORIDE mmol/L 97* 95* 95* 94*   CO2 mmol/L 28.0 26.0 27.0 25.0   BUN mg/dL 10 13 11 18   CREATININE mg/dL 0.54* 0.53* 0.53* 0.75*   GLUCOSE mg/dL 105* 134* 126* 123*   CALCIUM mg/dL 9.5 9.0 9.2 9.4   ALT  (SGPT) U/L  --   --   --  18   AST (SGOT) U/L  --   --   --  20     Estimated Creatinine Clearance: 108.2 mL/min (A) (by C-G formula based on SCr of 0.54 mg/dL (L)).    Microbiology Results Abnormal     Procedure Component Value - Date/Time    Blood Culture - Blood, Arm, Right [[id number]] Collected:  04/22/20 0601    Lab Status:  Preliminary result Specimen:  Blood from Arm, Right Updated:  04/24/20 0630     Blood Culture No growth at 2 days    Blood Culture - Blood, Hand, Left [[id number]] Collected:  04/22/20 0601    Lab Status:  Preliminary result Specimen:  Blood from Hand, Left Updated:  04/24/20 0630     Blood Culture No growth at 2 days          Imaging Results (Last 24 Hours)     Procedure Component Value Units Date/Time    XR Chest 1 View [[id number]] Collected:  04/23/20 1027     Updated:  04/23/20 2324    Narrative:       EXAMINATION: XR CHEST 1 VW- 04/23/2020     INDICATION: dyspnea; R10.9-Unspecified abdominal pain; R11.0-Nausea;  K63.1-Perforation of intestine (nontraumatic); R10.31-Right lower  quadrant pain     COMPARISON: 12/16/2017     FINDINGS: NG tube is seen passing at least to the level of the GE  junction. Heart is enlarged. Vasculature appears cephalized. Mild  diffuse interstitial changes may represent earliest changes of  interstitial edema, but are not extensive. No lung consolidation  effusion or pneumothorax is seen. No subdiaphragmatic free air is  appreciated.       Impression:       Cardiomegaly and perhaps mild pulmonary vascular congestion.     D:  04/23/2020  E:  04/23/2020         This report was finalized on 4/23/2020 11:21 PM by Dr. Axel Salcido MD.             Results for orders placed during the hospital encounter of 12/16/17   Adult Transthoracic Echo Complete W/ Cont if Necessary Per Protocol    Narrative · Left ventricular systolic function is normal. Estimated EF = 65%.  · The cardiac valves are anatomically and functionally normal.          I have reviewed the  medications:  Scheduled Meds:    allopurinol 300 mg Oral Daily   atorvastatin 10 mg Oral Nightly   bisacodyl 10 mg Oral Daily   carvedilol 12.5 mg Oral BID With Meals   docusate sodium 100 mg Oral BID   ferrous sulfate 325 mg Oral Every Other Day   hydrALAZINE 50 mg Oral TID   insulin lispro 0-7 Units Subcutaneous TID With Meals   ipratropium-albuterol 3 mL Nebulization 4x Daily - RT   lisinopril 20 mg Oral Daily   LORazepam 0.5 mg Intravenous Q8H   methylnaltrexone 4 mg Subcutaneous Every Other Day   metoclopramide 10 mg Intravenous Q6H   pantoprazole 40 mg Oral Q AM   piperacillin-tazobactam 4.5 g Intravenous Q8H   sodium chloride 10 mL Intravenous Q12H     Continuous Infusions:    HYDROmorphone HCl-NaCl      PRN Meds:.•  acetaminophen **OR** acetaminophen **OR** acetaminophen  •  bisacodyl  •  dextrose  •  dextrose  •  diphenhydrAMINE  •  gabapentin  •  glucagon (human recombinant)  •  HYDROcodone-acetaminophen  •  HYDROmorphone  •  ipratropium-albuterol  •  LORazepam **OR** LORazepam **OR** LORazepam **OR** LORazepam **OR** LORazepam **OR** LORazepam  •  naloxone  •  nicotine  •  ondansetron **OR** ondansetron  •  promethazine  •  sennosides-docusate  •  simethicone  •  sodium chloride  •  sodium chloride    Assessment/Plan   Assessment & Plan     Active Hospital Problems    Diagnosis  POA   • **Perforated bowel (CMS/HCC) [K63.1]  Yes   • Chronic pain [G89.29]  Yes   • Chronic alcohol abuse [F10.10]  Yes   • Hypoxia [R09.02]  Yes   • Dependent edema [R60.9]  Yes   • Tobacco abuse [Z72.0]  Yes   • Primary osteoarthritis of both hips [M16.0]  Yes   • Type 2 diabetes mellitus with complication, without long-term current use of insulin (CMS/HCC) [E11.8]  Yes      Resolved Hospital Problems   No resolved problems to display.        Brief Hospital Course to date:  Damion Llanos is a 64 y.o. male with Hx DM2, HTN, gout, chronic pain with opiates, who presented with acute abdominal pain and unrevealing CT of the abd  who worsened overnight and had repeat imaging with free air now s/p emergent laparotomy with perforated bowel. Has been doing well post operatively. Scheduled ativan was started as a mild tremor was noted and will continue to monitor CIWA scores.   This patient's problems and plans were partially entered by my partner and updated as appropriate by me 04/24/20.         Assessment/Plan     Perforated cecum s/p exploratory laparotomy  - s/p ex-lap with RT hemicolectomy with end ileostomy and mucous fistula 4/22/20 with Dr. Nugent  - cont pain control  -zosyn     Acute hypoxic respiratory insufficieny due to hypoventilation  Chronic tobacco abuse  -Increased wheezing today  -down to 2 L NC   - stopped IV fluids  -CXR 4/23, mild pulmonary vascular congestion  -re-start home lasix   -duoneb  -COPD nurse navigator       Mild hypovolemic hyponatremia  -Na 134 today   -iv fluids d/c today      Chronic EtOH use with dependence  - drinks 6-7 beers/night for years  - denies hx of withdrawal  - CIWA scoring and PRN ativan; start scheduled ativan 4/23     DM type 2, self reported recent a1c <7%  - on metformin only; accuchecks with SSI         Daily Care Communication  Due to current limited visitation policies, an attempt will be made daily to update patient's identified best point-of-contact(s)   Contact: Dmaion Llanos   Relation: Son    Type of communication (phone or televideo): phone    Time of communication:    Notes (if applicable): Son was happy with update          DVT prophylaxis:  mechanical    Disposition: I expect the patient to be discharged TBD pending post-operative course and improvement in bowel function.    CODE STATUS:   Code Status and Medical Interventions:   Ordered at: 04/21/20 1443     Code Status:    CPR     Medical Interventions (Level of Support Prior to Arrest):    Full     Comments:    Prior         Electronically signed by Azalea Glez DO, 04/24/20, 19:58.

## 2020-04-24 NOTE — PLAN OF CARE
Pt had some episodes of anxiety and he stated he felt SOA. O2 sats remained above 94% during these episodes, RR 16-22. Respiratory gave two breathing treatments and he stated they helped. Pt is ready to get the NG tube out. Only used PCA pump one time during the night, for his abdominal pain after getting up on the side of the bed. VSS. Will continue to  monitor.

## 2020-04-25 LAB
ANION GAP SERPL CALCULATED.3IONS-SCNC: 9 MMOL/L (ref 5–15)
BASOPHILS # BLD AUTO: 0.01 10*3/MM3 (ref 0–0.2)
BASOPHILS NFR BLD AUTO: 0.1 % (ref 0–1.5)
BUN BLD-MCNC: 6 MG/DL (ref 8–23)
BUN/CREAT SERPL: 13.3 (ref 7–25)
CALCIUM SPEC-SCNC: 9.3 MG/DL (ref 8.6–10.5)
CHLORIDE SERPL-SCNC: 95 MMOL/L (ref 98–107)
CO2 SERPL-SCNC: 28 MMOL/L (ref 22–29)
CREAT BLD-MCNC: 0.45 MG/DL (ref 0.76–1.27)
DEPRECATED RDW RBC AUTO: 50.3 FL (ref 37–54)
EOSINOPHIL # BLD AUTO: 0.11 10*3/MM3 (ref 0–0.4)
EOSINOPHIL NFR BLD AUTO: 1.3 % (ref 0.3–6.2)
ERYTHROCYTE [DISTWIDTH] IN BLOOD BY AUTOMATED COUNT: 13.5 % (ref 12.3–15.4)
GFR SERPL CREATININE-BSD FRML MDRD: >150 ML/MIN/1.73
GLUCOSE BLD-MCNC: 126 MG/DL (ref 65–99)
GLUCOSE BLDC GLUCOMTR-MCNC: 128 MG/DL (ref 70–130)
GLUCOSE BLDC GLUCOMTR-MCNC: 153 MG/DL (ref 70–130)
GLUCOSE BLDC GLUCOMTR-MCNC: 170 MG/DL (ref 70–130)
GLUCOSE BLDC GLUCOMTR-MCNC: 222 MG/DL (ref 70–130)
HCT VFR BLD AUTO: 34.4 % (ref 37.5–51)
HGB BLD-MCNC: 11 G/DL (ref 13–17.7)
IMM GRANULOCYTES # BLD AUTO: 0.04 10*3/MM3 (ref 0–0.05)
IMM GRANULOCYTES NFR BLD AUTO: 0.5 % (ref 0–0.5)
LYMPHOCYTES # BLD AUTO: 0.69 10*3/MM3 (ref 0.7–3.1)
LYMPHOCYTES NFR BLD AUTO: 7.9 % (ref 19.6–45.3)
MCH RBC QN AUTO: 32.2 PG (ref 26.6–33)
MCHC RBC AUTO-ENTMCNC: 32 G/DL (ref 31.5–35.7)
MCV RBC AUTO: 100.6 FL (ref 79–97)
MONOCYTES # BLD AUTO: 0.9 10*3/MM3 (ref 0.1–0.9)
MONOCYTES NFR BLD AUTO: 10.3 % (ref 5–12)
NEUTROPHILS # BLD AUTO: 6.98 10*3/MM3 (ref 1.7–7)
NEUTROPHILS NFR BLD AUTO: 79.9 % (ref 42.7–76)
NRBC BLD AUTO-RTO: 0 /100 WBC (ref 0–0.2)
PLATELET # BLD AUTO: 260 10*3/MM3 (ref 140–450)
PMV BLD AUTO: 10.2 FL (ref 6–12)
POTASSIUM BLD-SCNC: 3.7 MMOL/L (ref 3.5–5.2)
RBC # BLD AUTO: 3.42 10*6/MM3 (ref 4.14–5.8)
SODIUM BLD-SCNC: 132 MMOL/L (ref 136–145)
WBC NRBC COR # BLD: 8.73 10*3/MM3 (ref 3.4–10.8)

## 2020-04-25 PROCEDURE — 25010000002 HEPARIN (PORCINE) PER 1000 UNITS: Performed by: HOSPITALIST

## 2020-04-25 PROCEDURE — 99233 SBSQ HOSP IP/OBS HIGH 50: CPT | Performed by: HOSPITALIST

## 2020-04-25 PROCEDURE — 80048 BASIC METABOLIC PNL TOTAL CA: CPT | Performed by: SURGERY

## 2020-04-25 PROCEDURE — 94799 UNLISTED PULMONARY SVC/PX: CPT

## 2020-04-25 PROCEDURE — 25010000002 PIPERACILLIN SOD-TAZOBACTAM PER 1 G: Performed by: SURGERY

## 2020-04-25 PROCEDURE — 82962 GLUCOSE BLOOD TEST: CPT

## 2020-04-25 PROCEDURE — 25010000002 LORAZEPAM PER 2 MG: Performed by: FAMILY MEDICINE

## 2020-04-25 PROCEDURE — 25010000002 METOCLOPRAMIDE PER 10 MG: Performed by: SURGERY

## 2020-04-25 PROCEDURE — 85025 COMPLETE CBC W/AUTO DIFF WBC: CPT | Performed by: FAMILY MEDICINE

## 2020-04-25 RX ORDER — HEPARIN SODIUM 5000 [USP'U]/ML
5000 INJECTION, SOLUTION INTRAVENOUS; SUBCUTANEOUS EVERY 8 HOURS SCHEDULED
Status: DISCONTINUED | OUTPATIENT
Start: 2020-04-25 | End: 2020-04-27 | Stop reason: HOSPADM

## 2020-04-25 RX ADMIN — CARVEDILOL 12.5 MG: 12.5 TABLET, FILM COATED ORAL at 16:56

## 2020-04-25 RX ADMIN — TAZOBACTAM SODIUM AND PIPERACILLIN SODIUM 4.5 G: 500; 4 INJECTION, SOLUTION INTRAVENOUS at 20:54

## 2020-04-25 RX ADMIN — LORAZEPAM 0.5 MG: 2 INJECTION INTRAMUSCULAR; INTRAVENOUS at 09:27

## 2020-04-25 RX ADMIN — HYDRALAZINE HYDROCHLORIDE 50 MG: 50 TABLET, FILM COATED ORAL at 09:26

## 2020-04-25 RX ADMIN — TAZOBACTAM SODIUM AND PIPERACILLIN SODIUM 4.5 G: 500; 4 INJECTION, SOLUTION INTRAVENOUS at 14:38

## 2020-04-25 RX ADMIN — FERROUS SULFATE TAB 325 MG (65 MG ELEMENTAL FE) 325 MG: 325 (65 FE) TAB at 09:28

## 2020-04-25 RX ADMIN — HYDRALAZINE HYDROCHLORIDE 50 MG: 50 TABLET, FILM COATED ORAL at 16:56

## 2020-04-25 RX ADMIN — IPRATROPIUM BROMIDE AND ALBUTEROL SULFATE 3 ML: 2.5; .5 SOLUTION RESPIRATORY (INHALATION) at 16:50

## 2020-04-25 RX ADMIN — DOCUSATE SODIUM 100 MG: 100 CAPSULE ORAL at 09:27

## 2020-04-25 RX ADMIN — HEPARIN SODIUM 5000 UNITS: 5000 INJECTION, SOLUTION INTRAVENOUS; SUBCUTANEOUS at 14:37

## 2020-04-25 RX ADMIN — METOCLOPRAMIDE 10 MG: 5 INJECTION, SOLUTION INTRAMUSCULAR; INTRAVENOUS at 09:27

## 2020-04-25 RX ADMIN — IPRATROPIUM BROMIDE AND ALBUTEROL SULFATE 3 ML: 2.5; .5 SOLUTION RESPIRATORY (INHALATION) at 19:34

## 2020-04-25 RX ADMIN — HEPARIN SODIUM 5000 UNITS: 5000 INJECTION, SOLUTION INTRAVENOUS; SUBCUTANEOUS at 20:54

## 2020-04-25 RX ADMIN — BISACODYL 10 MG: 5 TABLET, COATED ORAL at 09:26

## 2020-04-25 RX ADMIN — ALLOPURINOL 300 MG: 300 TABLET ORAL at 09:27

## 2020-04-25 RX ADMIN — PANTOPRAZOLE SODIUM 40 MG: 40 TABLET, DELAYED RELEASE ORAL at 05:26

## 2020-04-25 RX ADMIN — ATORVASTATIN CALCIUM 10 MG: 10 TABLET, FILM COATED ORAL at 20:54

## 2020-04-25 RX ADMIN — IPRATROPIUM BROMIDE AND ALBUTEROL SULFATE 3 ML: 2.5; .5 SOLUTION RESPIRATORY (INHALATION) at 08:51

## 2020-04-25 RX ADMIN — DOCUSATE SODIUM 100 MG: 100 CAPSULE ORAL at 20:54

## 2020-04-25 RX ADMIN — CARVEDILOL 12.5 MG: 12.5 TABLET, FILM COATED ORAL at 09:26

## 2020-04-25 RX ADMIN — FUROSEMIDE 40 MG: 40 TABLET ORAL at 09:26

## 2020-04-25 RX ADMIN — LISINOPRIL 20 MG: 20 TABLET ORAL at 09:26

## 2020-04-25 RX ADMIN — HYDRALAZINE HYDROCHLORIDE 50 MG: 50 TABLET, FILM COATED ORAL at 20:54

## 2020-04-25 RX ADMIN — IPRATROPIUM BROMIDE AND ALBUTEROL SULFATE 3 ML: 2.5; .5 SOLUTION RESPIRATORY (INHALATION) at 13:09

## 2020-04-25 RX ADMIN — TAZOBACTAM SODIUM AND PIPERACILLIN SODIUM 4.5 G: 500; 4 INJECTION, SOLUTION INTRAVENOUS at 05:26

## 2020-04-25 NOTE — PROGRESS NOTES
"Patient Name:  Damion Llanos  YOB: 1955  [id number]    Surgery Progress Note    Date of visit: 4/25/2020    Subjective   Subjective: Feeling better. Tolerating PO, ostomy functional. Pain better controlled.         Objective     Objective:     /85 (BP Location: Right arm, Patient Position: Sitting)   Pulse 87   Temp 98.6 °F (37 °C) (Oral)   Resp 18   Ht 172.7 cm (68\")   Wt 108 kg (237 lb)   SpO2 92%   BMI 36.04 kg/m²     Intake/Output Summary (Last 24 hours) at 4/25/2020 1039  Last data filed at 4/25/2020 0900  Gross per 24 hour   Intake 1385.4 ml   Output 2020 ml   Net -634.6 ml       CV:  Rhythm  regular and rate regular   L:  Clear  to auscultation bilaterally   Abd:  Bowel sounds positive , soft, less tender. Wound c/d/i. Ostomy pink, viable with stool output.  Ext:  No cyanosis, clubbing, edema    Recent labs that are back at this time have been reviewed. WBC 8.7       Assessment/Plan     Assessment/ Plan:    Hospital Problem List     * (Principal) Perforated bowel (CMS/HCC) - Doing better. D/C PCA, advance diet. PO vee meds.      Tobacco abuse    Primary osteoarthritis of both hips    Type 2 diabetes mellitus with complication, without long-term current use of insulin (CMS/HCC)        Dependent edema    Chronic pain    Chronic alcohol abuse        Hypoxia              El Nugent MD  4/25/2020  10:39      "

## 2020-04-25 NOTE — PROGRESS NOTES
[facility] Medicine Services  PROGRESS NOTE    Patient Name: Damion Llanos  : 1955  MRN: [id number]    Date of Admission: 2020  Primary Care Physician: Konstantin Palm MD    Subjective   Subjective     CC:  Follow up abdominal pain    HPI:  Sitting up in chair. Notes dry cough, but no SOA today. No f/c. No n/v. Tolerating PO. Some abdominal soreness noted.    Review of Systems   Constitutional: Positive for activity change and fatigue.   HENT: Positive for congestion.    Respiratory: Positive for cough. Negative for chest tightness and shortness of breath.    Cardiovascular: Negative for chest pain.   Gastrointestinal: Positive for abdominal distention and abdominal pain.   Genitourinary: Negative.    Musculoskeletal: Negative.    Neurological: Negative.    Psychiatric/Behavioral: Positive for dysphoric mood and sleep disturbance.     Objective   Objective     Vital Signs:   Temp:  [98.4 °F (36.9 °C)-98.7 °F (37.1 °C)] 98.6 °F (37 °C)  Heart Rate:  [72-87] 87  Resp:  [18-19] 18  BP: (131-173)/(71-88) 168/85        Physical Exam  NAD, alert and oriented x 3  OP clear, MMM  PERRL  Neck supple  RRR  Decreased at bases, without wheezes or rales, no tachypnea and non-labored  +BS, Mildly tender, appropriately post-op, ostomy with stool  No rashes  WARD  No edema noted  Flat affect    Results Reviewed:  Results from last 7 days   Lab Units 20  0559   WBC 10*3/mm3 8.73 10.63 8.86  --    HEMOGLOBIN g/dL 11.0* 10.6* 10.9*  --    HEMATOCRIT % 34.4* 33.6* 35.0*  --    PLATELETS 10*3/mm3 260 239 212  --    INR   --   --   --  1.28*     Results from last 7 days   Lab Units 20  0536  20  0939   SODIUM mmol/L 132* 134* 130*   < > 131*   POTASSIUM mmol/L 3.7 4.1 4.2   < > 4.7   CHLORIDE mmol/L 95* 97* 95*   < > 94*   CO2 mmol/L 28.0 28.0 26.0   < > 25.0   BUN mg/dL 6* 10 13   < > 18   CREATININE mg/dL  0.45* 0.54* 0.53*   < > 0.75*   GLUCOSE mg/dL 126* 105* 134*   < > 123*   CALCIUM mg/dL 9.3 9.5 9.0   < > 9.4   ALT (SGPT) U/L  --   --   --   --  18   AST (SGOT) U/L  --   --   --   --  20    < > = values in this interval not displayed.     Estimated Creatinine Clearance: 108.2 mL/min (A) (by C-G formula based on SCr of 0.45 mg/dL (L)).    Microbiology Results Abnormal     Procedure Component Value - Date/Time    Blood Culture - Blood, Arm, Right [[id number]] Collected:  04/22/20 0601    Lab Status:  Preliminary result Specimen:  Blood from Arm, Right Updated:  04/25/20 0630     Blood Culture No growth at 3 days    Blood Culture - Blood, Hand, Left [[id number]] Collected:  04/22/20 0601    Lab Status:  Preliminary result Specimen:  Blood from Hand, Left Updated:  04/25/20 0630     Blood Culture No growth at 3 days          Imaging Results (Last 24 Hours)     ** No results found for the last 24 hours. **          Results for orders placed during the hospital encounter of 12/16/17   Adult Transthoracic Echo Complete W/ Cont if Necessary Per Protocol    Narrative · Left ventricular systolic function is normal. Estimated EF = 65%.  · The cardiac valves are anatomically and functionally normal.          I have reviewed the medications:  Scheduled Meds:    allopurinol 300 mg Oral Daily   atorvastatin 10 mg Oral Nightly   bisacodyl 10 mg Oral Daily   carvedilol 12.5 mg Oral BID With Meals   docusate sodium 100 mg Oral BID   ferrous sulfate 325 mg Oral Every Other Day   furosemide 40 mg Oral Daily   hydrALAZINE 50 mg Oral TID   insulin lispro 0-7 Units Subcutaneous TID With Meals   ipratropium-albuterol 3 mL Nebulization 4x Daily - RT   lisinopril 20 mg Oral Daily   LORazepam 0.5 mg Intravenous Q8H   methylnaltrexone 4 mg Subcutaneous Every Other Day   metoclopramide 10 mg Intravenous Q6H   pantoprazole 40 mg Oral Q AM   piperacillin-tazobactam 4.5 g Intravenous Q8H   sodium chloride 10 mL Intravenous Q12H     Continuous  Infusions:    HYDROmorphone HCl-NaCl      PRN Meds:.•  acetaminophen **OR** acetaminophen **OR** acetaminophen  •  bisacodyl  •  dextrose  •  dextrose  •  diphenhydrAMINE  •  gabapentin  •  glucagon (human recombinant)  •  HYDROcodone-acetaminophen  •  HYDROmorphone  •  ipratropium-albuterol  •  LORazepam **OR** LORazepam **OR** LORazepam **OR** LORazepam **OR** LORazepam **OR** LORazepam  •  naloxone  •  nicotine  •  ondansetron **OR** ondansetron  •  promethazine  •  sennosides-docusate  •  simethicone  •  sodium chloride  •  sodium chloride    Assessment/Plan   Assessment & Plan     Active Hospital Problems    Diagnosis  POA   • **Perforated bowel (CMS/HCC) [K63.1]  Yes   • Chronic pain [G89.29]  Yes   • Chronic alcohol abuse [F10.10]  Yes   • Hypoxia [R09.02]  Yes   • Dependent edema [R60.9]  Yes   • Tobacco abuse [Z72.0]  Yes   • Primary osteoarthritis of both hips [M16.0]  Yes   • Type 2 diabetes mellitus with complication, without long-term current use of insulin (CMS/HCC) [E11.8]  Yes      Resolved Hospital Problems   No resolved problems to display.        Brief Hospital Course to date:  Damion Llanos is a 64 y.o. male with Hx DM2, HTN, gout, chronic pain with opiates, who presented with acute abdominal pain and unrevealing CT of the abd who worsened overnight and had repeat imaging with free air now s/p emergent laparotomy with perforated bowel. Has been doing well post operatively. Scheduled ativan was started as a mild tremor was noted and will continue to monitor CIWA scores.   I have reviewed the hospital course entered by my partner and agree, and will edit the note to reflect my care/contribution.       Assessment/Plan     Perforated cecum s/p exploratory laparotomy  -s/p ex-lap with R hemicolectomy/end ileostomy/mucous fistula  -zosyn  -advance PO per surgery  -mobilize     Acute hypoxic respiratory insufficieny due to hypoventilation  Chronic tobacco abuse  -suspected volume overload from fluid  resuscitation  -diuresed  -IVF stopped  -nebs     Mild hypovolemic hyponatremia  -low but stable, chronic ETOH use     Chronic ETOH use with dependence  -scheduled ativan, prn per CIWA     DM type 2, self reported recent a1c <7%  -SSI    Discussed with son today at 1220 PM on 4/25/2020.    DVT prophylaxis:  BINDU    Disposition: I expect the patient to be discharged TBD pending post-operative course and improvement in bowel function.    CODE STATUS:   Code Status and Medical Interventions:   Ordered at: 04/21/20 1443     Code Status:    CPR     Medical Interventions (Level of Support Prior to Arrest):    Full     Comments:    Prior         Electronically signed by Axel Galvez MD, 04/25/20, 09:18.

## 2020-04-25 NOTE — PLAN OF CARE
Pt calm and able to rest most of the night. Around 0500 pt called out feeling SOA. VSS. Pt had just gotten up to chair. After a few minutes he was back to baseline. He is complaining of abdominal/side pain from coughing. Ostomy letting flatus and dark brown stool out. Will continue to monitor.

## 2020-04-25 NOTE — NURSING NOTE
Appliance intact.   Some stomal function.   Loose brown output noted.   Stoma looks good.     Encouragement provided and discussed POC.   Encouraged ambulation.     Will plan to change appliance tomorrow and perform extensive stomal education.   Patient lives alone and will need some support early on with stomal assistance.   HH would likely be a benefit.     Thanks

## 2020-04-26 LAB
ANION GAP SERPL CALCULATED.3IONS-SCNC: 11 MMOL/L (ref 5–15)
BUN BLD-MCNC: 6 MG/DL (ref 8–23)
BUN/CREAT SERPL: 12.5 (ref 7–25)
CALCIUM SPEC-SCNC: 9.6 MG/DL (ref 8.6–10.5)
CHLORIDE SERPL-SCNC: 94 MMOL/L (ref 98–107)
CO2 SERPL-SCNC: 26 MMOL/L (ref 22–29)
CREAT BLD-MCNC: 0.48 MG/DL (ref 0.76–1.27)
DEPRECATED RDW RBC AUTO: 47.2 FL (ref 37–54)
ERYTHROCYTE [DISTWIDTH] IN BLOOD BY AUTOMATED COUNT: 13.2 % (ref 12.3–15.4)
GFR SERPL CREATININE-BSD FRML MDRD: >150 ML/MIN/1.73
GLUCOSE BLD-MCNC: 143 MG/DL (ref 65–99)
GLUCOSE BLDC GLUCOMTR-MCNC: 158 MG/DL (ref 70–130)
GLUCOSE BLDC GLUCOMTR-MCNC: 183 MG/DL (ref 70–130)
GLUCOSE BLDC GLUCOMTR-MCNC: 226 MG/DL (ref 70–130)
HCT VFR BLD AUTO: 36.6 % (ref 37.5–51)
HGB BLD-MCNC: 11.8 G/DL (ref 13–17.7)
MCH RBC QN AUTO: 31.5 PG (ref 26.6–33)
MCHC RBC AUTO-ENTMCNC: 32.2 G/DL (ref 31.5–35.7)
MCV RBC AUTO: 97.6 FL (ref 79–97)
PLATELET # BLD AUTO: 320 10*3/MM3 (ref 140–450)
PMV BLD AUTO: 9.9 FL (ref 6–12)
POTASSIUM BLD-SCNC: 3.9 MMOL/L (ref 3.5–5.2)
RBC # BLD AUTO: 3.75 10*6/MM3 (ref 4.14–5.8)
SODIUM BLD-SCNC: 131 MMOL/L (ref 136–145)
WBC NRBC COR # BLD: 8.81 10*3/MM3 (ref 3.4–10.8)

## 2020-04-26 PROCEDURE — 82962 GLUCOSE BLOOD TEST: CPT

## 2020-04-26 PROCEDURE — 94799 UNLISTED PULMONARY SVC/PX: CPT

## 2020-04-26 PROCEDURE — 25010000002 HEPARIN (PORCINE) PER 1000 UNITS: Performed by: HOSPITALIST

## 2020-04-26 PROCEDURE — 99232 SBSQ HOSP IP/OBS MODERATE 35: CPT | Performed by: NURSE PRACTITIONER

## 2020-04-26 PROCEDURE — 85027 COMPLETE CBC AUTOMATED: CPT | Performed by: HOSPITALIST

## 2020-04-26 PROCEDURE — 80048 BASIC METABOLIC PNL TOTAL CA: CPT | Performed by: HOSPITALIST

## 2020-04-26 PROCEDURE — 25010000002 PIPERACILLIN SOD-TAZOBACTAM PER 1 G: Performed by: SURGERY

## 2020-04-26 RX ADMIN — ATORVASTATIN CALCIUM 10 MG: 10 TABLET, FILM COATED ORAL at 20:05

## 2020-04-26 RX ADMIN — CARVEDILOL 12.5 MG: 12.5 TABLET, FILM COATED ORAL at 08:39

## 2020-04-26 RX ADMIN — IPRATROPIUM BROMIDE AND ALBUTEROL SULFATE 3 ML: 2.5; .5 SOLUTION RESPIRATORY (INHALATION) at 16:11

## 2020-04-26 RX ADMIN — HYDRALAZINE HYDROCHLORIDE 50 MG: 50 TABLET, FILM COATED ORAL at 20:05

## 2020-04-26 RX ADMIN — HEPARIN SODIUM 5000 UNITS: 5000 INJECTION, SOLUTION INTRAVENOUS; SUBCUTANEOUS at 22:05

## 2020-04-26 RX ADMIN — LISINOPRIL 20 MG: 20 TABLET ORAL at 08:39

## 2020-04-26 RX ADMIN — FUROSEMIDE 40 MG: 40 TABLET ORAL at 08:39

## 2020-04-26 RX ADMIN — TAZOBACTAM SODIUM AND PIPERACILLIN SODIUM 4.5 G: 500; 4 INJECTION, SOLUTION INTRAVENOUS at 22:04

## 2020-04-26 RX ADMIN — GABAPENTIN 300 MG: 300 CAPSULE ORAL at 05:07

## 2020-04-26 RX ADMIN — HYDRALAZINE HYDROCHLORIDE 50 MG: 50 TABLET, FILM COATED ORAL at 08:39

## 2020-04-26 RX ADMIN — IPRATROPIUM BROMIDE AND ALBUTEROL SULFATE 3 ML: 2.5; .5 SOLUTION RESPIRATORY (INHALATION) at 19:31

## 2020-04-26 RX ADMIN — HYDROCODONE BITARTRATE AND ACETAMINOPHEN 1 TABLET: 10; 325 TABLET ORAL at 13:41

## 2020-04-26 RX ADMIN — HYDROCODONE BITARTRATE AND ACETAMINOPHEN 1 TABLET: 10; 325 TABLET ORAL at 22:02

## 2020-04-26 RX ADMIN — TAZOBACTAM SODIUM AND PIPERACILLIN SODIUM 4.5 G: 500; 4 INJECTION, SOLUTION INTRAVENOUS at 05:04

## 2020-04-26 RX ADMIN — IPRATROPIUM BROMIDE AND ALBUTEROL SULFATE 3 ML: 2.5; .5 SOLUTION RESPIRATORY (INHALATION) at 13:14

## 2020-04-26 RX ADMIN — GABAPENTIN 300 MG: 300 CAPSULE ORAL at 20:10

## 2020-04-26 RX ADMIN — PANTOPRAZOLE SODIUM 40 MG: 40 TABLET, DELAYED RELEASE ORAL at 05:04

## 2020-04-26 RX ADMIN — HEPARIN SODIUM 5000 UNITS: 5000 INJECTION, SOLUTION INTRAVENOUS; SUBCUTANEOUS at 13:41

## 2020-04-26 RX ADMIN — ALLOPURINOL 300 MG: 300 TABLET ORAL at 08:39

## 2020-04-26 RX ADMIN — HYDROCODONE BITARTRATE AND ACETAMINOPHEN 1 TABLET: 10; 325 TABLET ORAL at 05:08

## 2020-04-26 RX ADMIN — DOCUSATE SODIUM 100 MG: 100 CAPSULE ORAL at 08:39

## 2020-04-26 RX ADMIN — HYDRALAZINE HYDROCHLORIDE 50 MG: 50 TABLET, FILM COATED ORAL at 17:24

## 2020-04-26 RX ADMIN — HEPARIN SODIUM 5000 UNITS: 5000 INJECTION, SOLUTION INTRAVENOUS; SUBCUTANEOUS at 05:04

## 2020-04-26 RX ADMIN — CARVEDILOL 12.5 MG: 12.5 TABLET, FILM COATED ORAL at 17:24

## 2020-04-26 RX ADMIN — IPRATROPIUM BROMIDE AND ALBUTEROL SULFATE 3 ML: 2.5; .5 SOLUTION RESPIRATORY (INHALATION) at 08:47

## 2020-04-26 RX ADMIN — TAZOBACTAM SODIUM AND PIPERACILLIN SODIUM 4.5 G: 500; 4 INJECTION, SOLUTION INTRAVENOUS at 13:41

## 2020-04-26 NOTE — NURSING NOTE
Appliance change and stomal education performed:     Stoma is functioning well.   Stoma is viable and protrudes above the surface of the skin.   Peristomal skin is dry, small intact blister to the peristomal area.   Stoma measures 44mm.   [id number]     Patient might go home tomorrow.   Patient needs to perform stomal change tomorrow.   Patient very attentive during care and appliance change.     Thanks

## 2020-04-26 NOTE — PLAN OF CARE
Problem: Fall Risk (Adult)  Goal: Absence of Fall  Description  Patient will demonstrate the desired outcomes by discharge/transition of care.  Outcome: Ongoing (interventions implemented as appropriate)  Flowsheets (Taken 4/26/2020 0304)  Absence of Fall: making progress toward outcome     Problem: Patient Care Overview  Goal: Plan of Care Review  Outcome: Ongoing (interventions implemented as appropriate)  Flowsheets  Taken 4/23/2020 0259 by Ricarda Knight, RN  Progress: no change  Outcome Summary: Pt has complained of frequent abdominal pain but has had adequate relief with the ordered PRN medications. VSS. Pt awake on and off throughout the shift. Good UOP from ramírez. Will continue to monitor.  Taken 4/26/2020 0000 by Camilla Pirest, RN  Plan of Care Reviewed With: patient  Goal: Discharge Needs Assessment  Outcome: Ongoing (interventions implemented as appropriate)  Flowsheets  Taken 4/22/2020 0522 by Isabel Baig, RN  Equipment Currently Used at Home: none  Taken 4/21/2020 1414 by Shanel Escobar  Transportation Anticipated: car, drives self  Transportation Concerns: car, none  Concerns to be Addressed: denies needs/concerns at this time  Readmission Within the Last 30 Days: no previous admission in last 30 days  Patient/Family Anticipated Services at Transition: none  Patient/Family Anticipates Transition to: home with family     Problem: Pain, Acute (Adult)  Goal: Acceptable Pain Control/Comfort Level  Description  Patient will demonstrate the desired outcomes by discharge/transition of care.  Outcome: Ongoing (interventions implemented as appropriate)  Flowsheets (Taken 4/26/2020 0304)  Acceptable Pain Control/Comfort Level: making progress toward outcome     Problem: Pain, Chronic (Adult)  Goal: Acceptable Pain/Comfort Level and Functional Ability  Description  Patient will demonstrate the desired outcomes by discharge/transition of care.  Outcome: Ongoing (interventions implemented as  appropriate)  Flowsheets (Taken 4/26/2020 0304)  Acceptable Pain/Comfort Level and Functional Ability: making progress toward outcome

## 2020-04-26 NOTE — PROGRESS NOTES
[facility] Medicine Services  PROGRESS NOTE    Patient Name: Damion Llanos  : 1955  MRN: [id number]    Date of Admission: 2020  Primary Care Physician: Konstantin Palm MD    Subjective   Subjective     CC:  Follow up bowel perforation    HPI:    Up on the side of bed this morning.  Denies any significant abdominal pain.  No nausea or vomiting.  Enjoyed his breakfast today.  No shortness of air.      Review of Systems     Gen- No fevers, chills  CV- No chest pain, palpitations  Resp- No cough, no dyspnea  GI- No N/V/D, no abd pain, + ostomy output      Objective   Objective     Vital Signs:   Temp:  [97.2 °F (36.2 °C)-98.4 °F (36.9 °C)] 98.4 °F (36.9 °C)  Heart Rate:  [74-90] 90  Resp:  [16-20] 18  BP: (144-173)/(68-88) 144/68        Physical Exam    Constitutional: No acute distress, awake, alert, up on side of bed  HENT: NCAT, mucous membranes moist  Respiratory: Clear to auscultation bilaterally, respiratory effort normal on room air   Cardiovascular: RRR, no murmurs, rubs, or gallops, palpable pedal pulses bilaterally  Gastrointestinal: Positive bowel sounds, soft, appropriately tender, nondistended, ostomy intact with liquid output, left lower quad DAISY drain with serosanguinous drainage.   Musculoskeletal: No bilateral ankle edema noted  Psychiatric: Appropriate affect, cooperative, pleasant  Neurologic: Oriented x 3, strength symmetric in all extremities, Cranial Nerves grossly intact to confrontation, speech clear  Skin: No rashes noted to exposed skin       Results Reviewed:  Results from last 7 days   Lab Units 20  0435 20  0355 20  0412  20  0559   WBC 10*3/mm3 8.81 8.73 10.63   < >  --    HEMOGLOBIN g/dL 11.8* 11.0* 10.6*   < >  --    HEMATOCRIT % 36.6* 34.4* 33.6*   < >  --    PLATELETS 10*3/mm3 320 260 239   < >  --    INR   --   --   --   --  1.28*    < > = values in this interval not displayed.     Results from last 7 days   Lab Units  04/26/20  0435 04/25/20  0355 04/24/20  0412  04/21/20  0939   SODIUM mmol/L 131* 132* 134*   < > 131*   POTASSIUM mmol/L 3.9 3.7 4.1   < > 4.7   CHLORIDE mmol/L 94* 95* 97*   < > 94*   CO2 mmol/L 26.0 28.0 28.0   < > 25.0   BUN mg/dL 6* 6* 10   < > 18   CREATININE mg/dL 0.48* 0.45* 0.54*   < > 0.75*   GLUCOSE mg/dL 143* 126* 105*   < > 123*   CALCIUM mg/dL 9.6 9.3 9.5   < > 9.4   ALT (SGPT) U/L  --   --   --   --  18   AST (SGOT) U/L  --   --   --   --  20    < > = values in this interval not displayed.     Estimated Creatinine Clearance: 108.2 mL/min (A) (by C-G formula based on SCr of 0.48 mg/dL (L)).    Microbiology Results Abnormal     Procedure Component Value - Date/Time    Blood Culture - Blood, Arm, Right [[id number]] Collected:  04/22/20 0601    Lab Status:  Preliminary result Specimen:  Blood from Arm, Right Updated:  04/26/20 0630     Blood Culture No growth at 4 days    Blood Culture - Blood, Hand, Left [[id number]] Collected:  04/22/20 0601    Lab Status:  Preliminary result Specimen:  Blood from Hand, Left Updated:  04/26/20 0630     Blood Culture No growth at 4 days          Imaging Results (Last 24 Hours)     ** No results found for the last 24 hours. **          Results for orders placed during the hospital encounter of 12/16/17   Adult Transthoracic Echo Complete W/ Cont if Necessary Per Protocol    Narrative · Left ventricular systolic function is normal. Estimated EF = 65%.  · The cardiac valves are anatomically and functionally normal.          I have reviewed the medications:  Scheduled Meds:    allopurinol 300 mg Oral Daily   atorvastatin 10 mg Oral Nightly   carvedilol 12.5 mg Oral BID With Meals   ferrous sulfate 325 mg Oral Every Other Day   furosemide 40 mg Oral Daily   heparin (porcine) 5,000 Units Subcutaneous Q8H   hydrALAZINE 50 mg Oral TID   insulin lispro 0-7 Units Subcutaneous TID With Meals   ipratropium-albuterol 3 mL Nebulization 4x Daily - RT   lisinopril 20 mg Oral Daily      pantoprazole 40 mg Oral Q AM   piperacillin-tazobactam 4.5 g Intravenous Q8H   sodium chloride 10 mL Intravenous Q12H     Continuous Infusions:     PRN Meds:.•  acetaminophen **OR** acetaminophen **OR** acetaminophen  •  dextrose  •  dextrose  •  diphenhydrAMINE  •  gabapentin  •  glucagon (human recombinant)  •  HYDROcodone-acetaminophen  •  HYDROmorphone  •  ipratropium-albuterol  •  LORazepam **OR** LORazepam **OR** LORazepam **OR** LORazepam **OR** LORazepam **OR** LORazepam  •  naloxone  •  nicotine  •  ondansetron **OR** ondansetron  •  promethazine  •  sennosides-docusate  •  simethicone  •  sodium chloride  •  sodium chloride    Assessment/Plan   Assessment & Plan     Active Hospital Problems    Diagnosis  POA   • **Perforated bowel (CMS/HCC) [K63.1]  Yes   • Chronic pain [G89.29]  Yes   • Chronic alcohol abuse [F10.10]  Yes   • Hypoxia [R09.02]  Yes   • Dependent edema [R60.9]  Yes   • Tobacco abuse [Z72.0]  Yes   • Primary osteoarthritis of both hips [M16.0]  Yes   • Type 2 diabetes mellitus with complication, without long-term current use of insulin (CMS/HCC) [E11.8]  Yes      Resolved Hospital Problems   No resolved problems to display.     The following progress note was copied from a note written by my partner Axel Galvez MD on 4/25/2020.  I have reviewed the information, verified it for accuracy and made changes as appropriate.     Brief Hospital Course to date:  Damion Llanos is a 64 y.o. male with Hx DM2, HTN, gout, chronic pain with opiates, who presented with acute abdominal pain and unrevealing CT of the abd who worsened overnight and had repeat imaging with free air now s/p emergent laparotomy with perforated bowel. Has been doing well post operatively. Scheduled ativan was started as a mild tremor was noted and will continue to monitor CIWA scores.      Assessment/Plan:     Perforated cecum s/p exploratory laparotomy  -s/p ex-lap with R hemicolectomy/end ileostomy/mucous fistula by   Raffi on 4/22   - continue zosyn  -advance PO per surgery-doing well on GI soft/bland diet  -continue to ambulate.   -DAISY drain to be discontinued per surgery.      Acute hypoxic respiratory insufficieny due to hypoventilation  Chronic tobacco abuse  -suspected volume overload from fluid resuscitation  -s/p diuresis and now doing well on room air    Mild hypovolemic hyponatremia  -low but stable, chronic ETOH use     Chronic ETOH use with dependence  - Continue PRN ativan per CIWA protocol     DM type 2, self reported recent a1c <7%  -SSI      Daily Care Communication  Due to current limited visitation policies, an attempt will be made daily to update patient's identified best point-of-contact(s)   Contact: Damion Llanos   Relation: Son   Type of communication (phone or televideo): telephone [phone]   Time of communication: 12:02 pm   Notes (if applicable): Called son to update per patient request. Hopeful for home tomorrow.           DVT prophylaxis:  BINDU    Disposition: I expect the patient to be discharged tomorrow if okay with Dr. Nugent.   CODE STATUS:   Code Status and Medical Interventions:   Ordered at: 04/21/20 1443     Code Status:    CPR     Medical Interventions (Level of Support Prior to Arrest):    Full     Comments:    Prior         Electronically signed by MIKAYLA Mai, 04/26/20, 12:05.

## 2020-04-26 NOTE — PROGRESS NOTES
"Patient Name:  Damion Llanos  YOB: 1955  [id number]    Surgery Progress Note    Date of visit: 4/26/2020    Subjective   Subjective: Feels much better. Tolerating PO, ostomy functional.         Objective     Objective:     /68 (BP Location: Right arm, Patient Position: Lying)   Pulse 90   Temp 98.4 °F (36.9 °C) (Oral)   Resp 18   Ht 172.7 cm (68\")   Wt 108 kg (237 lb)   SpO2 (!) 89%   BMI 36.04 kg/m²     Intake/Output Summary (Last 24 hours) at 4/26/2020 0912  Last data filed at 4/26/2020 0615  Gross per 24 hour   Intake 580 ml   Output 3565 ml   Net -2985 ml       CV:  Rhythm  regular and rate regular   L:  Clear ] to auscultation bilaterally   Abd:  Bowel sounds positive , soft, nontender. Wound c/d/i. Ostomy pink with stool output. DAISY serous, scant.  Ext:  No cyanosis, clubbing, edema    Recent labs that are back at this time have been reviewed.        Assessment/Plan     Assessment/ Plan:    Hospital Problem List     * (Principal) Perforated bowel (CMS/HCC) - Doing well. Stop all laxatives, D/C DAISY. Follow ileostomy output. If stable, may be able to go home tomorrow.      Tobacco abuse    Primary osteoarthritis of both hips    Type 2 diabetes mellitus with complication, without long-term current use of insulin (CMS/HCC)        Dependent edema    Chronic pain    Chronic alcohol abuse        Hypoxia              El Nugent MD  4/26/2020  09:12      "

## 2020-04-27 VITALS
WEIGHT: 237 LBS | BODY MASS INDEX: 35.92 KG/M2 | SYSTOLIC BLOOD PRESSURE: 161 MMHG | TEMPERATURE: 98.7 F | DIASTOLIC BLOOD PRESSURE: 90 MMHG | OXYGEN SATURATION: 95 % | RESPIRATION RATE: 18 BRPM | HEART RATE: 97 BPM | HEIGHT: 68 IN

## 2020-04-27 LAB
ANION GAP SERPL CALCULATED.3IONS-SCNC: 13 MMOL/L (ref 5–15)
BACTERIA SPEC AEROBE CULT: NORMAL
BACTERIA SPEC AEROBE CULT: NORMAL
BUN BLD-MCNC: 7 MG/DL (ref 8–23)
BUN/CREAT SERPL: 12.3 (ref 7–25)
CALCIUM SPEC-SCNC: 9.1 MG/DL (ref 8.6–10.5)
CHLORIDE SERPL-SCNC: 98 MMOL/L (ref 98–107)
CO2 SERPL-SCNC: 24 MMOL/L (ref 22–29)
CREAT BLD-MCNC: 0.57 MG/DL (ref 0.76–1.27)
DEPRECATED RDW RBC AUTO: 48.8 FL (ref 37–54)
ERYTHROCYTE [DISTWIDTH] IN BLOOD BY AUTOMATED COUNT: 13.5 % (ref 12.3–15.4)
GFR SERPL CREATININE-BSD FRML MDRD: 143 ML/MIN/1.73
GLUCOSE BLD-MCNC: 138 MG/DL (ref 65–99)
GLUCOSE BLDC GLUCOMTR-MCNC: 181 MG/DL (ref 70–130)
GLUCOSE BLDC GLUCOMTR-MCNC: 189 MG/DL (ref 70–130)
HCT VFR BLD AUTO: 35.1 % (ref 37.5–51)
HGB BLD-MCNC: 11.2 G/DL (ref 13–17.7)
MCH RBC QN AUTO: 31.4 PG (ref 26.6–33)
MCHC RBC AUTO-ENTMCNC: 31.9 G/DL (ref 31.5–35.7)
MCV RBC AUTO: 98.3 FL (ref 79–97)
PLATELET # BLD AUTO: 340 10*3/MM3 (ref 140–450)
PMV BLD AUTO: 9.6 FL (ref 6–12)
POTASSIUM BLD-SCNC: 4.1 MMOL/L (ref 3.5–5.2)
RBC # BLD AUTO: 3.57 10*6/MM3 (ref 4.14–5.8)
SODIUM BLD-SCNC: 135 MMOL/L (ref 136–145)
WBC NRBC COR # BLD: 8.58 10*3/MM3 (ref 3.4–10.8)

## 2020-04-27 PROCEDURE — 85027 COMPLETE CBC AUTOMATED: CPT | Performed by: SURGERY

## 2020-04-27 PROCEDURE — 80048 BASIC METABOLIC PNL TOTAL CA: CPT | Performed by: SURGERY

## 2020-04-27 PROCEDURE — 25010000002 HEPARIN (PORCINE) PER 1000 UNITS: Performed by: HOSPITALIST

## 2020-04-27 PROCEDURE — 25010000002 PIPERACILLIN SOD-TAZOBACTAM PER 1 G: Performed by: SURGERY

## 2020-04-27 PROCEDURE — 99239 HOSP IP/OBS DSCHRG MGMT >30: CPT | Performed by: NURSE PRACTITIONER

## 2020-04-27 PROCEDURE — 94799 UNLISTED PULMONARY SVC/PX: CPT

## 2020-04-27 PROCEDURE — 82962 GLUCOSE BLOOD TEST: CPT

## 2020-04-27 RX ORDER — AMOXICILLIN AND CLAVULANATE POTASSIUM 875; 125 MG/1; MG/1
1 TABLET, FILM COATED ORAL 2 TIMES DAILY
Qty: 5 TABLET | Refills: 0 | Status: SHIPPED | OUTPATIENT
Start: 2020-04-27 | End: 2020-11-17

## 2020-04-27 RX ORDER — DIPHENOXYLATE HYDROCHLORIDE AND ATROPINE SULFATE 2.5; .025 MG/1; MG/1
2 TABLET ORAL 4 TIMES DAILY
Qty: 24 TABLET | Refills: 0 | Status: SHIPPED | OUTPATIENT
Start: 2020-04-27 | End: 2020-11-17

## 2020-04-27 RX ORDER — DIPHENOXYLATE HYDROCHLORIDE AND ATROPINE SULFATE 2.5; .025 MG/1; MG/1
2 TABLET ORAL 4 TIMES DAILY
Status: DISCONTINUED | OUTPATIENT
Start: 2020-04-27 | End: 2020-04-27 | Stop reason: HOSPADM

## 2020-04-27 RX ORDER — METRONIDAZOLE 500 MG/1
500 TABLET ORAL 3 TIMES DAILY
Qty: 15 TABLET | Refills: 0 | Status: SHIPPED | OUTPATIENT
Start: 2020-04-27 | End: 2020-11-17

## 2020-04-27 RX ADMIN — FERROUS SULFATE TAB 325 MG (65 MG ELEMENTAL FE) 325 MG: 325 (65 FE) TAB at 08:42

## 2020-04-27 RX ADMIN — DIPHENOXYLATE HYDROCHLORIDE AND ATROPINE SULFATE 2 TABLET: 2.5; .025 TABLET ORAL at 11:42

## 2020-04-27 RX ADMIN — HYDRALAZINE HYDROCHLORIDE 50 MG: 50 TABLET, FILM COATED ORAL at 08:42

## 2020-04-27 RX ADMIN — HYDROCODONE BITARTRATE AND ACETAMINOPHEN 1 TABLET: 10; 325 TABLET ORAL at 08:42

## 2020-04-27 RX ADMIN — IPRATROPIUM BROMIDE AND ALBUTEROL SULFATE 3 ML: 2.5; .5 SOLUTION RESPIRATORY (INHALATION) at 15:48

## 2020-04-27 RX ADMIN — CARVEDILOL 12.5 MG: 12.5 TABLET, FILM COATED ORAL at 08:42

## 2020-04-27 RX ADMIN — TAZOBACTAM SODIUM AND PIPERACILLIN SODIUM 4.5 G: 500; 4 INJECTION, SOLUTION INTRAVENOUS at 05:26

## 2020-04-27 RX ADMIN — TAZOBACTAM SODIUM AND PIPERACILLIN SODIUM 4.5 G: 500; 4 INJECTION, SOLUTION INTRAVENOUS at 11:42

## 2020-04-27 RX ADMIN — HEPARIN SODIUM 5000 UNITS: 5000 INJECTION, SOLUTION INTRAVENOUS; SUBCUTANEOUS at 11:42

## 2020-04-27 RX ADMIN — ALLOPURINOL 300 MG: 300 TABLET ORAL at 08:42

## 2020-04-27 RX ADMIN — IPRATROPIUM BROMIDE AND ALBUTEROL SULFATE 3 ML: 2.5; .5 SOLUTION RESPIRATORY (INHALATION) at 08:50

## 2020-04-27 RX ADMIN — FUROSEMIDE 40 MG: 40 TABLET ORAL at 08:42

## 2020-04-27 RX ADMIN — PANTOPRAZOLE SODIUM 40 MG: 40 TABLET, DELAYED RELEASE ORAL at 05:26

## 2020-04-27 RX ADMIN — GABAPENTIN 300 MG: 300 CAPSULE ORAL at 08:43

## 2020-04-27 RX ADMIN — LISINOPRIL 20 MG: 20 TABLET ORAL at 08:42

## 2020-04-27 RX ADMIN — HEPARIN SODIUM 5000 UNITS: 5000 INJECTION, SOLUTION INTRAVENOUS; SUBCUTANEOUS at 05:26

## 2020-04-27 RX ADMIN — DIPHENOXYLATE HYDROCHLORIDE AND ATROPINE SULFATE 2 TABLET: 2.5; .025 TABLET ORAL at 08:42

## 2020-04-27 NOTE — PLAN OF CARE
Problem: Fall Risk (Adult)  Goal: Absence of Fall  Flowsheets (Taken 4/27/2020 0405)  Absence of Fall: achieves outcome     Problem: Patient Care Overview  Goal: Plan of Care Review  Flowsheets (Taken 4/27/2020 0405)  Progress: improving  Plan of Care Reviewed With: patient  Outcome Summary: VSS, pt c/o abdominal pain, pain med given prn. denies N/V. adequate UOP and SOP. pt reported passing gas. Anticipate d/c to home today. will continue to monitor.     Problem: Pain, Acute (Adult)  Goal: Acceptable Pain Control/Comfort Level  Flowsheets (Taken 4/27/2020 0405)  Acceptable Pain Control/Comfort Level: making progress toward outcome

## 2020-04-27 NOTE — CONSULTS
NN spoke with pt at BS.  Pt alert and able to answer questions appropriately.  O2 sat 96% on RA, home baseline O2 status.  Pt states that he is disappointed that he will not be going home today.  COPD action plan reviewed.  Pt reports that he has read over the smoking cessation materials left at BS.  No other questions or concerns voiced at this time.  NN continues to follow.

## 2020-04-27 NOTE — PROGRESS NOTES
"                  Clinical Nutrition   Nutrition Assessment  Reason for Visit:   Follow-up protocol     Patient Name: Damion Llanos  YOB: 1955  MRN: [id number]  Date of Encounter: 04/27/20 13:50  Admission date: 4/21/2020    Nutrition Assessment   Assessment     Admission Diagnosis   Perforated bowel (CMS/HCC)  Dependent edema  Chronic pain   Hypoxia  + tobacco use- 1 ppd  + ETOH use- 7-8 beers daily     Additional applicable diagnosis/conditions/procedures this adm:  (4/22) s/p lap ex, R hemicolectomy, ileostomy, mucus fistula   (4/24) NGT removed     Applicable PMH/PSxH:   T2DM  HTN  HLD  GERD  Gout   Anemia   OA SHAWANDA hips     Reported/Observed/Food/Nutrition Related History:     Anthropometrics     Height: 172.7 cm (68\")  Last filed wt: Weight: 108 kg (237 lb) (04/21/20 0923)  Weight Method: Stated    BMI: BMI (Calculated): 36  Obese Class II: 35-39.9kg/m2    Ideal Body Weight (IBW) (kg): 70.89    Labs reviewed   Yes;   Hyponatremia   Results from last 7 days   Lab Units 04/27/20  0514 04/26/20  0435 04/25/20  0355  04/23/20  0536 04/22/20  0358 04/21/20  0939   GLUCOSE mg/dL 138* 143* 126*   < > 134* 126* 123*   BUN mg/dL 7* 6* 6*   < > 13 11 18   CREATININE mg/dL 0.57* 0.48* 0.45*   < > 0.53* 0.53* 0.75*   SODIUM mmol/L 135* 131* 132*   < > 130* 133* 131*   CHLORIDE mmol/L 98 94* 95*   < > 95* 95* 94*   POTASSIUM mmol/L 4.1 3.9 3.7   < > 4.2 4.8 4.7   MAGNESIUM mg/dL  --   --   --   --  1.9 1.6  --    ALT (SGPT) U/L  --   --   --   --   --   --  18    < > = values in this interval not displayed.     Results from last 7 days   Lab Units 04/21/20  0939   ALBUMIN g/dL 4.30     Results from last 7 days   Lab Units 04/27/20  1103 04/27/20  0727 04/26/20  1703 04/26/20  1150 04/26/20  0725 04/25/20  2119   GLUCOSE mg/dL 189* 181* 158* 226* 183* 222*     Lab Results   Lab Value Date/Time    HGBA1C <4.2 (L) 12/16/2017 1306     Medications reviewed   Pertinent: lomotil, iron, lasix, insulin, protonix, IV " abx   PRN: neurontin, zofran, phenergan, norco, dilaudid, ativan, nicotine, pericolace      Current Nutrition Prescription     PO: Diet Regular; GI Soft, Consistent Carbohydrate    Oral Nutrition Supplement: Boost Breeze TID     Intake: 75% of 5 meals     Nutrition Diagnosis   4/24; updated 4/27  Problem Inadequate oral intake   Etiology Perforated bowel   Signs/Symptoms 75% of 5 meals    Status: resolved (4/27)    Nutrition Intervention     1.  Follow treatment progress, Care plan reviewed    Goal:   General: Nutrition support treatment, Improve nutrition related labs  PO: Maintain intake, Continue positive trend     Monitoring/Evaluation:   Per protocol, I&O, PO intake, Supplement intake, Pertinent labs, GI status, Symptoms    Will Continue to follow per protocol    Carlee Adorno RD  Time Spent: 15 minutes

## 2020-04-27 NOTE — DISCHARGE SUMMARY
[facility] Medicine Services  DISCHARGE SUMMARY    Patient Name: Damion Llanos  : 1955  MRN: [id number]    Date of Admission: 2020  9:15 AM  Date of Discharge:  2020  Primary Care Physician: Konstantin Palm MD    Consults     No orders found from 3/23/2020 to 2020.          Hospital Course     Presenting Problem:   Abdominal pain, unspecified abdominal location [R10.9]  Abdominal pain, unspecified abdominal location [R10.9]    Active Hospital Problems    Diagnosis  POA   • **Perforated bowel (CMS/HCC) [K63.1]  Yes   • Chronic pain [G89.29]  Yes   • Chronic alcohol abuse [F10.10]  Yes   • Hypoxia [R09.02]  Yes   • Dependent edema [R60.9]  Yes   • Tobacco abuse [Z72.0]  Yes   • Primary osteoarthritis of both hips [M16.0]  Yes   • Type 2 diabetes mellitus with complication, without long-term current use of insulin (CMS/HCC) [E11.8]  Yes      Resolved Hospital Problems   No resolved problems to display.          Hospital Course:  Damion Llanos is a 64 y.o. male with a history of type 2 diabetes, hypertension, gout, chronic pain on opiates who presented to [facility] with acute abdominal pain.  He was admitted to hospital medicine services and had a CT of the abdomen with which was initially unrevealing.  His abdominal pain worsened overnight and he had repeat imaging with free air.  The general surgeon was emergently consulted and the patient was taken to the OR for an emergent laparotomy with end ileostomy placement.  He was found to have a perforated cecum.  He was continued on IV Zosyn.  He developed some acute hypoxic respiratory insufficiency due to hypoventilation after surgery.  Was likely due to some volume overload from fluid resuscitation.  He was diuresed and is now doing well on room air.  Patient has done well in the postop period.  He has been seen by the wound ostomy nurse and has been able to change his own ostomy appliance.  He is now  medically stable and ready to be discharged home.  He will follow-up with Dr. Nugent in the next couple of weeks.  He will be placed on Lomotil to assist as needed with loose stool.  He will also need to increase his fluid intake to combat his stool output.  Patient should also follow-up with his primary care provider at discharge.      Discharge Follow Up Recommendations for outpatient labs/diagnostics:  Follow-up with Dr. Nugent in 2 weeks  Follow-up with PCP first available hospital follow-up appointment    Day of Discharge     HPI:   Doing well this morning.  Wound ostomy nurse currently in room doing teaching.  Anxious to discharge home today.    Review of Systems  Gen- No fevers, chills  CV- No chest pain, palpitations  Resp- No cough, dyspnea  GI- No N/V/D, abd pain        Vital Signs:   Temp:  [98.4 °F (36.9 °C)-98.7 °F (37.1 °C)] 98.7 °F (37.1 °C)  Heart Rate:  [77-97] 97  Resp:  [18] 18  BP: (123-161)/(63-90) 161/90     Physical Exam:    Constitutional: No acute distress, awake, alert, resting comfortably in bed  HENT: NCAT, mucous membranes moist  Respiratory: Clear to auscultation bilaterally, respiratory effort normal on room air   Cardiovascular: RRR, no murmurs, rubs, or gallops, palpable pedal pulses bilaterally  Gastrointestinal: Positive bowel sounds, soft, appropriately tender, nondistended, ostomy intact with liquid output, left lower quad DAISY drain with serosanguinous drainage.   Musculoskeletal: No bilateral ankle edema noted  Psychiatric: Appropriate affect, cooperative, pleasant  Neurologic: Oriented x 3, strength symmetric in all extremities, Cranial Nerves grossly intact to confrontation, speech clear  Skin: No rashes noted to exposed skin     Pertinent  and/or Most Recent Results     Results from last 7 days   Lab Units 04/27/20  0514 04/26/20  0435 04/25/20  0355 04/24/20  0412 04/23/20  0536 04/22/20  0358 04/21/20  0939   WBC 10*3/mm3 8.58 8.81 8.73 10.63 8.86 14.91* 13.77*   HEMOGLOBIN  g/dL 11.2* 11.8* 11.0* 10.6* 10.9* 12.1* 13.0   HEMATOCRIT % 35.1* 36.6* 34.4* 33.6* 35.0* 38.1 40.0   PLATELETS 10*3/mm3 340 320 260 239 212 244 261   SODIUM mmol/L 135* 131* 132* 134* 130* 133* 131*   POTASSIUM mmol/L 4.1 3.9 3.7 4.1 4.2 4.8 4.7   CHLORIDE mmol/L 98 94* 95* 97* 95* 95* 94*   CO2 mmol/L 24.0 26.0 28.0 28.0 26.0 27.0 25.0   BUN mg/dL 7* 6* 6* 10 13 11 18   CREATININE mg/dL 0.57* 0.48* 0.45* 0.54* 0.53* 0.53* 0.75*   GLUCOSE mg/dL 138* 143* 126* 105* 134* 126* 123*   CALCIUM mg/dL 9.1 9.6 9.3 9.5 9.0 9.2 9.4     Results from last 7 days   Lab Units 04/22/20  0559 04/21/20  0939   BILIRUBIN mg/dL  --  0.7   ALK PHOS U/L  --  78   ALT (SGPT) U/L  --  18   AST (SGOT) U/L  --  20   PROTIME Seconds 15.7*  --    INR  1.28*  --    APTT seconds 34.8  --            Invalid input(s): TG, LDLCALC, LDLREALC  Results from last 7 days   Lab Units 04/22/20  0559 04/21/20  0939   LACTATE mmol/L 0.9 1.2       Brief Urine Lab Results  (Last result in the past 365 days)      Color   Clarity   Blood   Leuk Est   Nitrite   Protein   CREAT   Urine HCG        04/21/20 1243 Yellow Clear Negative Negative Negative Negative               Microbiology Results Abnormal     Procedure Component Value - Date/Time    Blood Culture - Blood, Arm, Right [[id number]] Collected:  04/22/20 0601    Lab Status:  Final result Specimen:  Blood from Arm, Right Updated:  04/27/20 0630     Blood Culture No growth at 5 days    Blood Culture - Blood, Hand, Left [[id number]] Collected:  04/22/20 0601    Lab Status:  Final result Specimen:  Blood from Hand, Left Updated:  04/27/20 0630     Blood Culture No growth at 5 days          Imaging Results (All)     Procedure Component Value Units Date/Time    XR Chest 1 View [[id number]] Collected:  04/23/20 1027     Updated:  04/23/20 2324    Narrative:       EXAMINATION: XR CHEST 1 VW- 04/23/2020     INDICATION: dyspnea; R10.9-Unspecified abdominal pain; R11.0-Nausea;  K63.1-Perforation of intestine  (nontraumatic); R10.31-Right lower  quadrant pain     COMPARISON: 12/16/2017     FINDINGS: NG tube is seen passing at least to the level of the GE  junction. Heart is enlarged. Vasculature appears cephalized. Mild  diffuse interstitial changes may represent earliest changes of  interstitial edema, but are not extensive. No lung consolidation  effusion or pneumothorax is seen. No subdiaphragmatic free air is  appreciated.       Impression:       Cardiomegaly and perhaps mild pulmonary vascular congestion.     D:  04/23/2020  E:  04/23/2020         This report was finalized on 4/23/2020 11:21 PM by Dr. Axel Salcido MD.       CT Abdomen Pelvis Without Contrast [[id number]] Collected:  04/22/20 0524     Updated:  04/22/20 0527    Narrative:       CT Abdomen Pelvis WO    INDICATION:   Increasing abdominal pain with nausea.    TECHNIQUE:   CT of the abdomen and pelvis without IV contrast. Coronal and sagittal reconstructions were obtained.  Radiation dose reduction techniques included automated exposure control or exposure modulation based on body size. Count of known CT and cardiac nuc  med studies performed in previous 12 months: 1.     COMPARISON:   4/21/2020    FINDINGS:  There is mild atelectasis in the lung bases. Gallbladder contains some high density material which may be some vicariously excreted IV contrast from the prior CT. No definite renal or ureteral stones. No hydronephrosis. There is a vascular calcification  in the right kidney. Solid abdominal organs are within normal limits. Urinary bladder is normal. It contains excreted IV contrast. There is atherosclerotic disease with no aortic aneurysm. There is multilevel degenerative disease in the lumbar spine with  multilevel spondylolisthesis. There are bilateral L5 pars defects.    There has been interval development of a moderate amount of free intraperitoneal air compatible with a bowel perforation. No clear site of perforation is identified. There are  multiple floating gas-filled small bowel loops in the midabdomen suggesting an  ileus or developing small bowel obstruction. The mid to lower colon is relatively decompressed. The cecum is in the right mid to upper abdomen. The appendix is normal.      Impression:         1. Interval development of a moderate amount of free intraperitoneal air compatible with a bowel perforation. Site of perforation is unclear based on the images provided. General surgical consultation is recommended.  2. Prominent small bowel loops may reflect an ileus or developing small bowel obstruction.  3. The appendix is normal.  4. Additional findings as above.    NOTIFICATION: Critical Value/emergent results were called by telephone at the time of interpretation on 4/22/2020 5:22 AM to MIKAYLA Cook who verbally acknowledged these results.                Signer Name: Juice Bland MD   Signed: 4/22/2020 5:24 AM   Workstation Name: [id number]    [facility]    CT Abdomen Pelvis With Contrast [[id number]] Collected:  04/21/20 1048     Updated:  04/21/20 1305    Narrative:       EXAMINATION: CT ABDOMEN/PELVIS WITH CONTRAST-04/21/2020:      INDICATION: Ventral hernia, RLQ tender, right lower quadrant tenderness.     TECHNIQUE: Multiple axial CT imaging was obtained of the abdomen and  pelvis following the administration of intravenous contrast.     The radiation dose reduction device was turned on for each scan per the  ALARA (As Low as Reasonably Achievable) protocol.     COMPARISON: NONE.     FINDINGS:      ABDOMEN: The lung bases are grossly clear with no features of pneumonia.  The liver is homogeneous in appearance. No stones seen in the  gallbladder. The spleen is homogeneous in appearance. The kidneys and  adrenal glands are unremarkable. Vascular calcification seen within the  abdominal aorta and iliac vessels. The pancreas is homogeneous in  appearance. There is some free fluid identified along the left  flank and  left paracolic gutter. The appearance of the bowel is grossly  unremarkable in appearance. There is some fluid-filled loops of small  bowel identified in which a mild enteritis cannot be completely  excluded. Etiology of the fluid is uncertain. There is no abdominal or  retroperitoneal adenopathy. Small cyst identified along the subcutaneous  tissues in the right lower back. Tiny periumbilical hernia containing  minimal fat. No evidence of strangulation.     PELVIS: The pelvic organs are unremarkable. The pelvic portions of the  gastrointestinal tract are within normal limits. No free fluid or free  air. No abnormal mass or fluid collection is identified. The appendix is  radiographically normal in appearance. No pelvic adenopathy. No abnormal  mass or fluid collection is identified. The bony structures reveal no  evidence of osseous abnormality. Degenerative changes seen within the  spine and pelvis.       Impression:       Mild fluid-filled loops of small bowel in which a mild  enteritis cannot be excluded. No significant wall thickening. Minimal  free fluid seen along the left paracolic gutter. No other CT evidence of  acute intra-abdominal or pelvic abnormality. The appendix is normal. The  lung bases are clear with no features of pneumonia.     D:  04/21/2020  E:  04/21/2020     This report was finalized on 4/21/2020 1:02 PM by Dr. Clair Pedro MD.                       Results for orders placed during the hospital encounter of 12/16/17   Adult Transthoracic Echo Complete W/ Cont if Necessary Per Protocol    Narrative · Left ventricular systolic function is normal. Estimated EF = 65%.  · The cardiac valves are anatomically and functionally normal.          Plan for Follow-up of Pending Labs/Results:     Discharge Details        Discharge Medications      New Medications      Instructions Start Date   amoxicillin-clavulanate 875-125 MG per tablet  Commonly known as:  AUGMENTIN   1 tablet, Oral,  2 Times Daily      diphenoxylate-atropine 2.5-0.025 MG per tablet  Commonly known as:  LOMOTIL   2 tablets, Oral, 4 Times Daily      metroNIDAZOLE 500 MG tablet  Commonly known as:  FLAGYL   500 mg, Oral, 3 Times Daily         Continue These Medications      Instructions Start Date   albuterol sulfate  (90 Base) MCG/ACT inhaler  Commonly known as:  PROVENTIL HFA;VENTOLIN HFA;PROAIR HFA   2 puffs, Inhalation, Every 4 Hours PRN      allopurinol 300 MG tablet  Commonly known as:  ZYLOPRIM   300 mg, Oral, Daily      carvedilol 12.5 MG tablet  Commonly known as:  COREG   12.5 mg, Oral, 2 Times Daily With Meals      ferrous sulfate 325 (65 FE) MG tablet   325 mg, Oral, Every Other Day      furosemide 40 MG tablet  Commonly known as:  LASIX   40 mg, Oral, 2 Times Daily      gabapentin 300 MG capsule  Commonly known as:  NEURONTIN   300 mg, Oral, 3 Times Daily      hydrALAZINE 50 MG tablet  Commonly known as:  APRESOLINE   50 mg, Oral, 3 Times Daily      HYDROcodone-acetaminophen  MG per tablet  Commonly known as:  NORCO   1 tablet, Oral, Every 8 Hours PRN      lisinopril 40 MG tablet  Commonly known as:  PRINIVIL,ZESTRIL   20 mg, Oral, Daily      metFORMIN 500 MG tablet  Commonly known as:  GLUCOPHAGE   500 mg, Oral, 2 Times Daily With Meals      pantoprazole 40 MG EC tablet  Commonly known as:  PROTONIX   40 mg, Oral, Daily      potassium chloride 10 MEQ CR tablet  Commonly known as:  K-DUR,KLOR-CON   10 mEq, Oral, 2 Times Daily PRN      simvastatin 20 MG tablet  Commonly known as:  ZOCOR   20 mg, Oral, Nightly             No Known Allergies      Discharge Disposition:  Home or Self Care    Diet:  Hospital:  Diet Order   Procedures   • Diet Regular; GI Soft, Consistent Carbohydrate       Activity:  Activity Instructions     Activity as Tolerated               CODE STATUS:    Code Status and Medical Interventions:   Ordered at: 04/21/20 5965     Code Status:    CPR     Medical Interventions (Level of Support  Prior to Arrest):    Full     Comments:    Prior       No future appointments.    Additional Instructions for the Follow-ups that You Need to Schedule     Ambulatory Referral to Home Health   As directed      Face to Face Visit Date:  4/24/2020    Follow-up provider for Plan of Care?:  I treated the patient in an acute care facility and will not continue treatment after discharge.    Follow-up provider:  DUDLEY PALM [[id number]]    Reason/Clinical Findings:  Perforated cecum s/p exploratory laparotomy, ileostomy and mucous fistula    Describe mobility limitations that make leaving home difficult:  recent surgery, impaired physical mobility and gait endurance    Nursing/Therapeutic Services Requested:  Skilled Nursing    Skilled nursing orders:  Ostomy instruction Medication education    Frequency:  1 Week 1         Discharge Follow-up with PCP   As directed       Currently Documented PCP:    Dudley Palm MD    PCP Phone Number:    [phone]     Follow Up Details:  first available hospital follow up         Discharge Follow-up with Specialty: Dr. Nugent; 2 Weeks   As directed      Specialty:  Dr. Nugent    Follow Up:  2 Weeks         Discharge Follow-up with Specified Provider: Dr. Nugent; 2 Weeks   As directed      To:  Dr. Nugent    Follow Up:  2 Weeks               Time Spent on Discharge:  45 minutes    Electronically signed by MIKAYLA Mai, 04/27/20, 1:50 PM.

## 2020-04-27 NOTE — NURSING NOTE
Inpatient Ostomy Therapy Note    Date of Surgery: 4/22/20      Days Post Procedure:  5 Days Post-Op    Surgeon:  El Nugent MD    Ostomy:  End Ileostomy- RLQ and with mucous fistula    Appliance Type:  Ariton, 2 Piece and Flat    Appliance Size:  Size: 2-3/4    Stoma Description: Viable, Red and Moist    Stoma Size:  44mm    Peristomal Skin:  Intact    Last Appliance Change:  4/27/20    Accessories:  Stoma Powder and Barrier Spray    Education:  Diet, Showering, Travel, Emptying, Changing Appliance, Ordering Supplies, Outpatient Followup, [facility] [place], Treating Diarrhea, Peristomal Skin Issues and Crusting    Dressing Change:  Yes    Supplies Provided:  Yes    Education Folder Provided:  Yes    Plan of Care:  Next Appliance Change    Teaching provided to:  Patient    Comments:  WOC nurse f/u for ileostomy/mucous fistula. Stoma red and moist; protruding above skin level. Peristoma skin mildly reddened; barrier spray applied. Pt requested to try 2 piece appliance; David #[id number] 2 piece flat used. Pt emptied appliance and performed appliance change with minimal assistance.     Summary:  Extensive stomal education performed as above. Discharge supplies given to pt. He is being discharged home today with Home Health. He understands how to contact WO as needed for concerns.     Parris Smith, MIKAYLA - 04/27/20, 12:48 PM

## 2020-04-27 NOTE — PROGRESS NOTES
"Patient Name:  Damion Llanos  YOB: 1955  [id number]    Surgery Progress Note    Date of visit: 4/27/2020    Subjective   Subjective: Feels better. Wants to go home. Eating well, ostomy functional.         Objective     Objective:     /63 (BP Location: Right arm, Patient Position: Lying)   Pulse 83   Temp 98.4 °F (36.9 °C) (Oral)   Resp 18   Ht 172.7 cm (68\")   Wt 108 kg (237 lb)   SpO2 97%   BMI 36.04 kg/m²     Intake/Output Summary (Last 24 hours) at 4/27/2020 0643  Last data filed at 4/27/2020 0600  Gross per 24 hour   Intake 1440 ml   Output 3935 ml   Net -2495 ml       CV:  Rhythm  regular and rate regular   L:  Clear  to auscultation bilaterally   Abd:  Bowel sounds positive , soft, ostomy pink with stool output. Wound c/d/i. DAISY serous, scant  Ext:  No cyanosis, clubbing, edema    Recent labs that are back at this time have been reviewed.        Assessment/Plan     Assessment/ Plan:    Hospital Problem List     * (Principal) Perforated bowel (CMS/HCC) - Doing well. D/C DAISY. OK to go home from my standpoint on lomotil if medically ready and comfortable with ostomy changes, hopefully later today vs. Tomorrow. RTC with me in 2 weeks. Will need education on adequate fluid intake given ostomy.      Tobacco abuse    Primary osteoarthritis of both hips    Type 2 diabetes mellitus with complication, without long-term current use of insulin (CMS/HCC)        Dependent edema    Chronic pain    Chronic alcohol abuse        Hypoxia              El Nugent MD  4/27/2020  06:43      "

## 2020-04-27 NOTE — PROGRESS NOTES
Continued Stay Note  [facility]     Patient Name: Damion Llanos  MRN: [id number]  Today's Date: 4/27/2020    Admit Date: 4/21/2020    Discharge Plan     Row Name 04/27/20 1042       Plan    Plan  home with home health    Patient/Family in Agreement with Plan  yes    Plan Comments  I met with Mr. Llanos at the bedside to discuss his discharge plan. He is anticipating being discharged home tomorrow. [facility] has been arranged with skilled nursing. They will follow him for ostomy care and wound care to his abdominal incision. He denies having any additional discharge needs at this time. Case management will notify [facility] of Mr. Llanos's discharge.    Final Discharge Disposition Code  06 - home with home health care        Discharge Codes    No documentation.       Expected Discharge Date and Time     Expected Discharge Date Expected Discharge Time    Apr 28, 2020             Bry Schreiber RN

## 2020-04-27 NOTE — PROGRESS NOTES
Case Management Discharge Note      Final Note: I notified Abdi with [facility] that Mr. Llanos is being discharged today. They will follow him at home with skilled nursing.         Destination      No service has been selected for the patient.      Durable Medical Equipment      No service has been selected for the patient.      Dialysis/Infusion      No service has been selected for the patient.      Home Medical Care - Selection Complete      Service Provider Request Status Selected Services Address Phone Number Fax Number    [facility] Selected Home Health Services [address] [address] [phone]      Therapy      No service has been selected for the patient.      Community Resources      No service has been selected for the patient.             Final Discharge Disposition Code: 06 - home with home health care

## 2020-04-28 ENCOUNTER — READMISSION MANAGEMENT (OUTPATIENT)
Dept: CALL CENTER | Facility: HOSPITAL | Age: 65
End: 2020-04-28

## 2020-04-28 NOTE — OUTREACH NOTE
Prep Survey      Responses   [facility] [place] patient discharged from?  [place]   Is LACE score < 7 ?  No   Eligibility  Readm Mgmt   Discharge diagnosis  Perforated bowel   COVID-19 Test Status  Not tested   Does the patient have one of the following disease processes/diagnoses(primary or secondary)?  General Surgery   Does the patient have Home health ordered?  Yes   What is the Home health agency?   [facility]   Is there a DME ordered?  No   Comments regarding appointments  Follow up appointments made   General alerts for this patient  New ileostomy   Prep survey completed?  Yes          Gina Montiel RN

## 2020-04-30 ENCOUNTER — READMISSION MANAGEMENT (OUTPATIENT)
Dept: CALL CENTER | Facility: HOSPITAL | Age: 65
End: 2020-04-30

## 2020-04-30 NOTE — OUTREACH NOTE
General Surgery Week 1 Survey      Responses   [facility] patient discharged from?  [place]   Does the patient have one of the following disease processes/diagnoses(primary or secondary)?  General Surgery   Is there a successful TCM telephone encounter documented?  No   Week 1 attempt successful?  Yes   Call start time  1521   Call end time  1533   General alerts for this patient  New ileostomy   Discharge diagnosis  Perforated bowel   Meds reviewed with patient/caregiver?  Yes   Is the patient having any side effects they believe may be caused by any medication additions or changes?  No   Does the patient have all medications related to this admission filled (includes all antibiotics, pain medications, etc.)  Yes   Is the patient taking all medications as directed (includes completed medication regime)?  Yes   Does the patient have a follow up appointment scheduled with their surgeon?  Yes   Has the patient kept scheduled appointments due by today?  Yes   What is the Home health agency?   [facility]   Has home health visited the patient within 72 hours of discharge?  Yes   Psychosocial issues?  No   Comments  Patient is doing great. No complaints of pain. No issues with ileostomy.    Did the patient receive a copy of their discharge instructions?  Yes   Nursing interventions  Reviewed instructions with patient   What is the patient's perception of their health status since discharge?  Improving   Nursing interventions  Nurse provided patient education   Is the patient /caregiver able to teach back basic post-op care?  Continue use of incentive spirometry at least 1 week post discharge, Drive as instructed by MD in discharge instructions, Take showers only when approved by MD-sponge bathe until then, No tub bath, swimming, or hot tub until instructed by MD, Do not remove steri-strips, Keep incision areas clean,dry and protected, Lifting as instructed by MD in discharge instructions    Is the patient/caregiver able to teach back signs and symptoms of incisional infection?  Increased redness, swelling or pain at the incisonal site, Incisional warmth, Fever, Pus or odor from incision, Increased drainage or bleeding   Is the patient/caregiver able to teach back steps to recovery at home?  Set small, achievable goals for return to baseline health, Make a list of questions for surgeon's appointment, Rest and rebuild strength, gradually increase activity   Is the patient/caregiver able to teach back the hierarchy of who to call/visit for symptoms/problems? PCP, Specialist, Home health nurse, Urgent Care, ED, 911  Yes   Week 1 call completed?  Yes          Galo Aviles RN

## 2020-05-05 ENCOUNTER — READMISSION MANAGEMENT (OUTPATIENT)
Dept: CALL CENTER | Facility: HOSPITAL | Age: 65
End: 2020-05-05

## 2020-05-05 NOTE — OUTREACH NOTE
General Surgery Week 2 Survey      Responses   [facility] patient discharged from?  [place]   Does the patient have one of the following disease processes/diagnoses(primary or secondary)?  General Surgery   Week 2 attempt successful?  Yes   Call start time  1057   Call end time  1101   Discharge diagnosis  Perforated bowel,  New ileostomy   Meds reviewed with patient/caregiver?  Yes   Is the patient taking all medications as directed (includes completed medication regime)?  Yes   Does the patient have a follow up appointment scheduled with their surgeon?  Yes [5/11/20]   Has the patient kept scheduled appointments due by today?  N/A   Comments  PCP appt 5/24/20   What is the Home health agency?   [facility]   What is the patient's perception of their health status since discharge?  Improving   Nursing interventions  Nurse provided patient education   Is the patient /caregiver able to teach back basic post-op care?  Keep incision areas clean,dry and protected, Lifting as instructed by MD in discharge instructions   Is the patient/caregiver able to teach back signs and symptoms of incisional infection?  Fever, Pus or odor from incision   Is the patient/caregiver able to teach back steps to recovery at home?  Set small, achievable goals for return to baseline health, Rest and rebuild strength, gradually increase activity, Eat a well-balance diet   Week 2 call completed?  Yes          Catherine Llanos RN

## 2020-05-13 ENCOUNTER — READMISSION MANAGEMENT (OUTPATIENT)
Dept: CALL CENTER | Facility: HOSPITAL | Age: 65
End: 2020-05-13

## 2020-05-13 NOTE — OUTREACH NOTE
General Surgery Week 3 Survey      Responses   [facility] patient discharged from?  [place]   Does the patient have one of the following disease processes/diagnoses(primary or secondary)?  General Surgery   Week 3 attempt successful?  Yes   Call start time  1125   Call end time  1129   Discharge diagnosis  Perforated bowel,  New ileostomy   Meds reviewed with patient/caregiver?  Yes   Is the patient taking all medications as directed (includes completed medication regime)?  Yes   Does the patient have a follow up appointment scheduled with their surgeon?  Yes [2nd f/u with surgeon is in June]   Has the patient kept scheduled appointments due by today?  Yes   What is the Home health agency?   [facility]   What is the patient's perception of their health status since discharge?  Improving   Nursing interventions  Nurse provided patient education   Is the patient /caregiver able to teach back basic post-op care?  Keep incision areas clean,dry and protected   Is the patient/caregiver able to teach back signs and symptoms of incisional infection?  Pus or odor from incision   Is the patient/caregiver able to teach back steps to recovery at home?  Eat a well-balance diet   Additional teach back comments  Advised patient to ask for name of an ostomy nurse for help obtaining his needed supplies and where to get them from.    Week 3 call completed?  Yes          Catherine Llanos RN

## 2020-05-21 ENCOUNTER — READMISSION MANAGEMENT (OUTPATIENT)
Dept: CALL CENTER | Facility: HOSPITAL | Age: 65
End: 2020-05-21

## 2020-06-07 ENCOUNTER — OUTSIDE FACILITY SERVICE (OUTPATIENT)
Dept: HOSPITALIST | Facility: HOSPITAL | Age: 65
End: 2020-06-07

## 2020-06-07 PROCEDURE — G0180 MD CERTIFICATION HHA PATIENT: HCPCS | Performed by: FAMILY MEDICINE

## 2020-07-08 ENCOUNTER — APPOINTMENT (OUTPATIENT)
Dept: PREADMISSION TESTING | Facility: HOSPITAL | Age: 65
End: 2020-07-08

## 2020-07-08 LAB
REF LAB TEST METHOD: NORMAL
SARS-COV-2 RNA RESP QL NAA+PROBE: NOT DETECTED

## 2020-07-08 PROCEDURE — U0004 COV-19 TEST NON-CDC HGH THRU: HCPCS

## 2020-07-08 PROCEDURE — U0002 COVID-19 LAB TEST NON-CDC: HCPCS

## 2020-07-08 PROCEDURE — C9803 HOPD COVID-19 SPEC COLLECT: HCPCS

## 2020-11-17 ENCOUNTER — APPOINTMENT (OUTPATIENT)
Dept: PREADMISSION TESTING | Facility: HOSPITAL | Age: 65
End: 2020-11-17

## 2020-11-17 ENCOUNTER — HOSPITAL ENCOUNTER (OUTPATIENT)
Dept: GENERAL RADIOLOGY | Facility: HOSPITAL | Age: 65
Discharge: HOME | End: 2020-11-17

## 2020-11-17 VITALS — BODY MASS INDEX: 33.88 KG/M2 | WEIGHT: 223.55 LBS | HEIGHT: 68 IN

## 2020-11-17 LAB
ANION GAP SERPL CALCULATED.3IONS-SCNC: 14 MMOL/L (ref 5–15)
BUN SERPL-MCNC: 10 MG/DL (ref 8–23)
BUN/CREAT SERPL: 15.4 (ref 7–25)
CALCIUM SPEC-SCNC: 9.6 MG/DL (ref 8.6–10.5)
CHLORIDE SERPL-SCNC: 101 MMOL/L (ref 98–107)
CO2 SERPL-SCNC: 20 MMOL/L (ref 22–29)
CREAT SERPL-MCNC: 0.65 MG/DL (ref 0.76–1.27)
DEPRECATED RDW RBC AUTO: 45.3 FL (ref 37–54)
ERYTHROCYTE [DISTWIDTH] IN BLOOD BY AUTOMATED COUNT: 12.5 % (ref 12.3–15.4)
GFR SERPL CREATININE-BSD FRML MDRD: 123 ML/MIN/1.73
GLUCOSE SERPL-MCNC: 101 MG/DL (ref 65–99)
HBA1C MFR BLD: 5.7 % (ref 4.8–5.6)
HCT VFR BLD AUTO: 39.5 % (ref 37.5–51)
HGB BLD-MCNC: 13.1 G/DL (ref 13–17.7)
INR PPP: 1.1 (ref 0.85–1.16)
MCH RBC QN AUTO: 32.8 PG (ref 26.6–33)
MCHC RBC AUTO-ENTMCNC: 33.2 G/DL (ref 31.5–35.7)
MCV RBC AUTO: 99 FL (ref 79–97)
PLATELET # BLD AUTO: 238 10*3/MM3 (ref 140–450)
PMV BLD AUTO: 10.5 FL (ref 6–12)
POTASSIUM SERPL-SCNC: 4.8 MMOL/L (ref 3.5–5.2)
PROTHROMBIN TIME: 13.9 SECONDS (ref 11.5–14)
RBC # BLD AUTO: 3.99 10*6/MM3 (ref 4.14–5.8)
SODIUM SERPL-SCNC: 135 MMOL/L (ref 136–145)
WBC # BLD AUTO: 6.04 10*3/MM3 (ref 3.4–10.8)

## 2020-11-17 PROCEDURE — 71046 X-RAY EXAM CHEST 2 VIEWS: CPT

## 2020-11-17 PROCEDURE — 80048 BASIC METABOLIC PNL TOTAL CA: CPT

## 2020-11-17 PROCEDURE — 83036 HEMOGLOBIN GLYCOSYLATED A1C: CPT

## 2020-11-17 PROCEDURE — U0004 COV-19 TEST NON-CDC HGH THRU: HCPCS

## 2020-11-17 PROCEDURE — 85610 PROTHROMBIN TIME: CPT

## 2020-11-17 PROCEDURE — 93010 ELECTROCARDIOGRAM REPORT: CPT | Performed by: INTERNAL MEDICINE

## 2020-11-17 PROCEDURE — 93005 ELECTROCARDIOGRAM TRACING: CPT

## 2020-11-17 PROCEDURE — C9803 HOPD COVID-19 SPEC COLLECT: HCPCS

## 2020-11-17 PROCEDURE — 85027 COMPLETE CBC AUTOMATED: CPT

## 2020-11-17 PROCEDURE — 36415 COLL VENOUS BLD VENIPUNCTURE: CPT

## 2020-11-17 NOTE — PAT
Patient to apply Chlorhexadine wipes  to surgical area (as instructed) the night before procedure and the AM of procedure. Wipes provided.  Harini Alvarez contacted per orders.  Patient instructed to drink 20 ounces (or until full) of Gatorade and it needs to be completed 1 hour before given arrival time for procedure (NO RED Gatorade)    Patient verbalized understanding.  Per Anesthesia Request, patient instructed not to take their ACE/ARB medications on the AM of surgery.  An arrival time for procedure was not given during PAT visit. If patient had any questions or concerns about their arrival time, they were instructed to contact their surgeon/physician.  Additionally, if the patient referred to an arrival time that was acquired from their my chart account, patient was encouraged to verify that time with their surgeon/physician.  NO arrival times given in Pre Admission Testing Department.

## 2020-11-18 ENCOUNTER — ANESTHESIA EVENT (OUTPATIENT)
Dept: PERIOP | Facility: HOSPITAL | Age: 65
End: 2020-11-18

## 2020-11-18 LAB
QT INTERVAL: 384 MS
QTC INTERVAL: 408 MS
SARS-COV-2 RNA RESP QL NAA+PROBE: NOT DETECTED

## 2020-11-18 RX ORDER — FAMOTIDINE 20 MG/1
20 TABLET, FILM COATED ORAL ONCE
Status: CANCELLED | OUTPATIENT
Start: 2020-11-18 | End: 2020-11-18

## 2020-11-19 ENCOUNTER — HOSPITAL ENCOUNTER (INPATIENT)
Facility: HOSPITAL | Age: 65
LOS: 2 days | Discharge: HOME HEALTH | End: 2020-11-21
Attending: SURGERY | Admitting: SURGERY

## 2020-11-19 ENCOUNTER — ANESTHESIA (OUTPATIENT)
Dept: PERIOP | Facility: HOSPITAL | Age: 65
End: 2020-11-19

## 2020-11-19 DIAGNOSIS — Z98.890 H/O ILEOSTOMY: ICD-10-CM

## 2020-11-19 DIAGNOSIS — I10 ESSENTIAL HYPERTENSION: ICD-10-CM

## 2020-11-19 DIAGNOSIS — J44.1 CHRONIC OBSTRUCTIVE PULMONARY DISEASE WITH (ACUTE) EXACERBATION (HCC): ICD-10-CM

## 2020-11-19 DIAGNOSIS — E11.8 TYPE 2 DIABETES MELLITUS WITH COMPLICATION, WITHOUT LONG-TERM CURRENT USE OF INSULIN (HCC): ICD-10-CM

## 2020-11-19 DIAGNOSIS — K63.1 PERFORATED BOWEL (HCC): Primary | ICD-10-CM

## 2020-11-19 LAB
GLUCOSE BLDC GLUCOMTR-MCNC: 112 MG/DL (ref 70–130)
GLUCOSE BLDC GLUCOMTR-MCNC: 130 MG/DL (ref 70–130)
GLUCOSE BLDC GLUCOMTR-MCNC: 149 MG/DL (ref 70–130)
GLUCOSE BLDC GLUCOMTR-MCNC: 164 MG/DL (ref 70–130)
POTASSIUM SERPL-SCNC: 3.8 MMOL/L (ref 3.5–5.2)

## 2020-11-19 PROCEDURE — 84132 ASSAY OF SERUM POTASSIUM: CPT | Performed by: SURGERY

## 2020-11-19 PROCEDURE — 25010000002 ONDANSETRON PER 1 MG: Performed by: NURSE ANESTHETIST, CERTIFIED REGISTERED

## 2020-11-19 PROCEDURE — 25010000002 NEOSTIGMINE 10 MG/10ML SOLUTION: Performed by: NURSE ANESTHETIST, CERTIFIED REGISTERED

## 2020-11-19 PROCEDURE — 63710000001 INSULIN LISPRO (HUMAN) PER 5 UNITS: Performed by: SURGERY

## 2020-11-19 PROCEDURE — 44620 REPAIR BOWEL OPENING: CPT | Performed by: PHYSICIAN ASSISTANT

## 2020-11-19 PROCEDURE — 49561 PR REPAIR INCISIONAL HERNIA,STRANG: CPT | Performed by: PHYSICIAN ASSISTANT

## 2020-11-19 PROCEDURE — 25010000003 CEFAZOLIN IN DEXTROSE 2-4 GM/100ML-% SOLUTION: Performed by: NURSE ANESTHETIST, CERTIFIED REGISTERED

## 2020-11-19 PROCEDURE — 0WUF0JZ SUPPLEMENT ABDOMINAL WALL WITH SYNTHETIC SUBSTITUTE, OPEN APPROACH: ICD-10-PCS | Performed by: SURGERY

## 2020-11-19 PROCEDURE — 25010000002 METOCLOPRAMIDE PER 10 MG: Performed by: SURGERY

## 2020-11-19 PROCEDURE — 25010000002 DEXAMETHASONE SODIUM PHOSPHATE 10 MG/ML SOLUTION: Performed by: NURSE ANESTHETIST, CERTIFIED REGISTERED

## 2020-11-19 PROCEDURE — 88304 TISSUE EXAM BY PATHOLOGIST: CPT | Performed by: SURGERY

## 2020-11-19 PROCEDURE — 25010000002 PHENYLEPHRINE PER 1 ML: Performed by: NURSE ANESTHETIST, CERTIFIED REGISTERED

## 2020-11-19 PROCEDURE — 25010000002 FENTANYL CITRATE (PF) 100 MCG/2ML SOLUTION: Performed by: NURSE ANESTHETIST, CERTIFIED REGISTERED

## 2020-11-19 PROCEDURE — 25010000003 LIDOCAINE 1 % SOLUTION: Performed by: NURSE ANESTHETIST, CERTIFIED REGISTERED

## 2020-11-19 PROCEDURE — C1781 MESH (IMPLANTABLE): HCPCS | Performed by: SURGERY

## 2020-11-19 PROCEDURE — 82962 GLUCOSE BLOOD TEST: CPT

## 2020-11-19 PROCEDURE — 0DBB0ZZ EXCISION OF ILEUM, OPEN APPROACH: ICD-10-PCS | Performed by: SURGERY

## 2020-11-19 PROCEDURE — 25010000002 CEFAZOLIN PER 500 MG: Performed by: SURGERY

## 2020-11-19 PROCEDURE — 43246 EGD PLACE GASTROSTOMY TUBE: CPT | Performed by: PHYSICIAN ASSISTANT

## 2020-11-19 PROCEDURE — 25010000002 METHYLNALTREXONE 12 MG/0.6ML SOLUTION: Performed by: SURGERY

## 2020-11-19 PROCEDURE — 94799 UNLISTED PULMONARY SVC/PX: CPT

## 2020-11-19 PROCEDURE — 25010000002 PROPOFOL 10 MG/ML EMULSION: Performed by: NURSE ANESTHETIST, CERTIFIED REGISTERED

## 2020-11-19 DEVICE — PROXIMATE LINEAR CUTTER RELOAD, BLUE, 75MM
Type: IMPLANTABLE DEVICE | Status: FUNCTIONAL
Brand: PROXIMATE

## 2020-11-19 DEVICE — PHASIX ST MESH, RECTANGLE
Type: IMPLANTABLE DEVICE | Site: ILEUM | Status: FUNCTIONAL
Brand: PHASIX ST MESH

## 2020-11-19 DEVICE — PROXIMATE LINEAR STAPLER RELOADS
Type: IMPLANTABLE DEVICE | Status: FUNCTIONAL
Brand: PROXIMATE

## 2020-11-19 DEVICE — PROXIMATE RELOADABLE LINEAR STAPLER
Type: IMPLANTABLE DEVICE | Status: FUNCTIONAL
Brand: PROXIMATE

## 2020-11-19 DEVICE — PROXIMATE RELOADABLE LINEAR CUTTER WITH SAFETY LOCK-OUT, 75MM
Type: IMPLANTABLE DEVICE | Status: FUNCTIONAL
Brand: PROXIMATE

## 2020-11-19 RX ORDER — MAGNESIUM HYDROXIDE 1200 MG/15ML
LIQUID ORAL AS NEEDED
Status: DISCONTINUED | OUTPATIENT
Start: 2020-11-19 | End: 2020-11-19 | Stop reason: HOSPADM

## 2020-11-19 RX ORDER — ONDANSETRON 2 MG/ML
4 INJECTION INTRAMUSCULAR; INTRAVENOUS ONCE AS NEEDED
Status: DISCONTINUED | OUTPATIENT
Start: 2020-11-19 | End: 2020-11-19 | Stop reason: HOSPADM

## 2020-11-19 RX ORDER — LORAZEPAM 2 MG/ML
0.5 INJECTION INTRAMUSCULAR
Status: DISCONTINUED | OUTPATIENT
Start: 2020-11-19 | End: 2020-11-21 | Stop reason: HOSPADM

## 2020-11-19 RX ORDER — HYDROMORPHONE HYDROCHLORIDE 1 MG/ML
0.2 INJECTION, SOLUTION INTRAMUSCULAR; INTRAVENOUS; SUBCUTANEOUS
Status: DISCONTINUED | OUTPATIENT
Start: 2020-11-19 | End: 2020-11-21 | Stop reason: HOSPADM

## 2020-11-19 RX ORDER — NEOSTIGMINE METHYLSULFATE 1 MG/ML
INJECTION, SOLUTION INTRAVENOUS AS NEEDED
Status: DISCONTINUED | OUTPATIENT
Start: 2020-11-19 | End: 2020-11-19 | Stop reason: SURG

## 2020-11-19 RX ORDER — SODIUM CHLORIDE 0.9 % (FLUSH) 0.9 %
10 SYRINGE (ML) INJECTION AS NEEDED
Status: DISCONTINUED | OUTPATIENT
Start: 2020-11-19 | End: 2020-11-19 | Stop reason: HOSPADM

## 2020-11-19 RX ORDER — BUPIVACAINE HYDROCHLORIDE 2.5 MG/ML
INJECTION, SOLUTION EPIDURAL; INFILTRATION; INTRACAUDAL AS NEEDED
Status: DISCONTINUED | OUTPATIENT
Start: 2020-11-19 | End: 2020-11-19 | Stop reason: SURG

## 2020-11-19 RX ORDER — FUROSEMIDE 40 MG/1
40 TABLET ORAL DAILY
Status: DISCONTINUED | OUTPATIENT
Start: 2020-11-19 | End: 2020-11-21 | Stop reason: HOSPADM

## 2020-11-19 RX ORDER — LORAZEPAM 2 MG/ML
1 INJECTION INTRAMUSCULAR
Status: DISCONTINUED | OUTPATIENT
Start: 2020-11-19 | End: 2020-11-21 | Stop reason: HOSPADM

## 2020-11-19 RX ORDER — FENTANYL CITRATE 50 UG/ML
50 INJECTION, SOLUTION INTRAMUSCULAR; INTRAVENOUS
Status: DISCONTINUED | OUTPATIENT
Start: 2020-11-19 | End: 2020-11-19 | Stop reason: HOSPADM

## 2020-11-19 RX ORDER — CARVEDILOL 12.5 MG/1
12.5 TABLET ORAL 2 TIMES DAILY WITH MEALS
Status: DISCONTINUED | OUTPATIENT
Start: 2020-11-19 | End: 2020-11-21 | Stop reason: HOSPADM

## 2020-11-19 RX ORDER — ACETAMINOPHEN 500 MG
1000 TABLET ORAL ONCE
Status: COMPLETED | OUTPATIENT
Start: 2020-11-19 | End: 2020-11-19

## 2020-11-19 RX ORDER — ONDANSETRON 2 MG/ML
4 INJECTION INTRAMUSCULAR; INTRAVENOUS EVERY 6 HOURS PRN
Status: DISCONTINUED | OUTPATIENT
Start: 2020-11-19 | End: 2020-11-21 | Stop reason: HOSPADM

## 2020-11-19 RX ORDER — ALVIMOPAN 12 MG/1
12 CAPSULE ORAL 2 TIMES DAILY
Status: DISCONTINUED | OUTPATIENT
Start: 2020-11-20 | End: 2020-11-20

## 2020-11-19 RX ORDER — METOCLOPRAMIDE HYDROCHLORIDE 5 MG/ML
10 INJECTION INTRAMUSCULAR; INTRAVENOUS EVERY 6 HOURS SCHEDULED
Status: DISCONTINUED | OUTPATIENT
Start: 2020-11-19 | End: 2020-11-20

## 2020-11-19 RX ORDER — BISACODYL 5 MG/1
10 TABLET, DELAYED RELEASE ORAL DAILY
Status: DISCONTINUED | OUTPATIENT
Start: 2020-11-19 | End: 2020-11-21 | Stop reason: HOSPADM

## 2020-11-19 RX ORDER — CEFAZOLIN SODIUM 2 G/100ML
INJECTION, SOLUTION INTRAVENOUS AS NEEDED
Status: DISCONTINUED | OUTPATIENT
Start: 2020-11-19 | End: 2020-11-19 | Stop reason: SURG

## 2020-11-19 RX ORDER — CEFAZOLIN SODIUM 2 G/100ML
2 INJECTION, SOLUTION INTRAVENOUS EVERY 8 HOURS
Status: COMPLETED | OUTPATIENT
Start: 2020-11-19 | End: 2020-11-20

## 2020-11-19 RX ORDER — PROPOFOL 10 MG/ML
VIAL (ML) INTRAVENOUS AS NEEDED
Status: DISCONTINUED | OUTPATIENT
Start: 2020-11-19 | End: 2020-11-19 | Stop reason: SURG

## 2020-11-19 RX ORDER — LORAZEPAM 0.5 MG/1
0.5 TABLET ORAL
Status: DISCONTINUED | OUTPATIENT
Start: 2020-11-19 | End: 2020-11-21 | Stop reason: HOSPADM

## 2020-11-19 RX ORDER — CEFAZOLIN SODIUM 2 G/100ML
2 INJECTION, SOLUTION INTRAVENOUS ONCE
Status: COMPLETED | OUTPATIENT
Start: 2020-11-19 | End: 2020-11-19

## 2020-11-19 RX ORDER — DEXTROSE MONOHYDRATE 25 G/50ML
25 INJECTION, SOLUTION INTRAVENOUS
Status: DISCONTINUED | OUTPATIENT
Start: 2020-11-19 | End: 2020-11-21 | Stop reason: HOSPADM

## 2020-11-19 RX ORDER — GLYCOPYRROLATE 0.2 MG/ML
INJECTION INTRAMUSCULAR; INTRAVENOUS AS NEEDED
Status: DISCONTINUED | OUTPATIENT
Start: 2020-11-19 | End: 2020-11-19 | Stop reason: SURG

## 2020-11-19 RX ORDER — ATORVASTATIN CALCIUM 10 MG/1
10 TABLET, FILM COATED ORAL DAILY
Status: DISCONTINUED | OUTPATIENT
Start: 2020-11-19 | End: 2020-11-21 | Stop reason: HOSPADM

## 2020-11-19 RX ORDER — ENALAPRILAT 2.5 MG/2ML
1.25 INJECTION INTRAVENOUS EVERY 6 HOURS PRN
Status: DISCONTINUED | OUTPATIENT
Start: 2020-11-19 | End: 2020-11-21 | Stop reason: HOSPADM

## 2020-11-19 RX ORDER — ROCURONIUM BROMIDE 10 MG/ML
INJECTION, SOLUTION INTRAVENOUS AS NEEDED
Status: DISCONTINUED | OUTPATIENT
Start: 2020-11-19 | End: 2020-11-19 | Stop reason: SURG

## 2020-11-19 RX ORDER — MELOXICAM 15 MG/1
15 TABLET ORAL ONCE
Status: COMPLETED | OUTPATIENT
Start: 2020-11-19 | End: 2020-11-19

## 2020-11-19 RX ORDER — HYDROCODONE BITARTRATE AND ACETAMINOPHEN 10; 325 MG/1; MG/1
1 TABLET ORAL EVERY 4 HOURS PRN
Status: DISCONTINUED | OUTPATIENT
Start: 2020-11-19 | End: 2020-11-21 | Stop reason: HOSPADM

## 2020-11-19 RX ORDER — NICOTINE 21 MG/24HR
1 PATCH, TRANSDERMAL 24 HOURS TRANSDERMAL
Status: DISCONTINUED | OUTPATIENT
Start: 2020-11-19 | End: 2020-11-21 | Stop reason: HOSPADM

## 2020-11-19 RX ORDER — ONDANSETRON 2 MG/ML
INJECTION INTRAMUSCULAR; INTRAVENOUS AS NEEDED
Status: DISCONTINUED | OUTPATIENT
Start: 2020-11-19 | End: 2020-11-19 | Stop reason: SURG

## 2020-11-19 RX ORDER — SODIUM CHLORIDE 0.9 % (FLUSH) 0.9 %
10 SYRINGE (ML) INJECTION EVERY 12 HOURS SCHEDULED
Status: DISCONTINUED | OUTPATIENT
Start: 2020-11-19 | End: 2020-11-19 | Stop reason: HOSPADM

## 2020-11-19 RX ORDER — LIDOCAINE HYDROCHLORIDE 10 MG/ML
INJECTION, SOLUTION INFILTRATION; PERINEURAL AS NEEDED
Status: DISCONTINUED | OUTPATIENT
Start: 2020-11-19 | End: 2020-11-19 | Stop reason: SURG

## 2020-11-19 RX ORDER — DEXAMETHASONE SODIUM PHOSPHATE 10 MG/ML
INJECTION, SOLUTION INTRAMUSCULAR; INTRAVENOUS AS NEEDED
Status: DISCONTINUED | OUTPATIENT
Start: 2020-11-19 | End: 2020-11-19 | Stop reason: SURG

## 2020-11-19 RX ORDER — SODIUM CHLORIDE, SODIUM LACTATE, POTASSIUM CHLORIDE, CALCIUM CHLORIDE 600; 310; 30; 20 MG/100ML; MG/100ML; MG/100ML; MG/100ML
9 INJECTION, SOLUTION INTRAVENOUS CONTINUOUS
Status: DISCONTINUED | OUTPATIENT
Start: 2020-11-19 | End: 2020-11-19

## 2020-11-19 RX ORDER — PANTOPRAZOLE SODIUM 40 MG/1
40 TABLET, DELAYED RELEASE ORAL
Status: DISCONTINUED | OUTPATIENT
Start: 2020-11-20 | End: 2020-11-21 | Stop reason: HOSPADM

## 2020-11-19 RX ORDER — FAMOTIDINE 10 MG/ML
20 INJECTION, SOLUTION INTRAVENOUS ONCE
Status: COMPLETED | OUTPATIENT
Start: 2020-11-19 | End: 2020-11-19

## 2020-11-19 RX ORDER — SODIUM CHLORIDE, SODIUM LACTATE, POTASSIUM CHLORIDE, CALCIUM CHLORIDE 600; 310; 30; 20 MG/100ML; MG/100ML; MG/100ML; MG/100ML
150 INJECTION, SOLUTION INTRAVENOUS CONTINUOUS
Status: DISCONTINUED | OUTPATIENT
Start: 2020-11-19 | End: 2020-11-20

## 2020-11-19 RX ORDER — FERROUS SULFATE 325(65) MG
325 TABLET ORAL EVERY OTHER DAY
Status: DISCONTINUED | OUTPATIENT
Start: 2020-11-20 | End: 2020-11-21 | Stop reason: HOSPADM

## 2020-11-19 RX ORDER — PREGABALIN 75 MG/1
75 CAPSULE ORAL ONCE
Status: COMPLETED | OUTPATIENT
Start: 2020-11-19 | End: 2020-11-19

## 2020-11-19 RX ORDER — HEPARIN SODIUM 5000 [USP'U]/ML
5000 INJECTION, SOLUTION INTRAVENOUS; SUBCUTANEOUS EVERY 8 HOURS SCHEDULED
Status: DISCONTINUED | OUTPATIENT
Start: 2020-11-20 | End: 2020-11-21 | Stop reason: HOSPADM

## 2020-11-19 RX ORDER — LORAZEPAM 1 MG/1
1 TABLET ORAL
Status: DISCONTINUED | OUTPATIENT
Start: 2020-11-19 | End: 2020-11-21 | Stop reason: HOSPADM

## 2020-11-19 RX ORDER — ALBUTEROL SULFATE 2.5 MG/3ML
2.5 SOLUTION RESPIRATORY (INHALATION) EVERY 4 HOURS PRN
Status: DISCONTINUED | OUTPATIENT
Start: 2020-11-19 | End: 2020-11-21 | Stop reason: HOSPADM

## 2020-11-19 RX ORDER — NICOTINE POLACRILEX 4 MG
15 LOZENGE BUCCAL
Status: DISCONTINUED | OUTPATIENT
Start: 2020-11-19 | End: 2020-11-21 | Stop reason: HOSPADM

## 2020-11-19 RX ORDER — HYDROMORPHONE HYDROCHLORIDE 1 MG/ML
0.5 INJECTION, SOLUTION INTRAMUSCULAR; INTRAVENOUS; SUBCUTANEOUS
Status: DISCONTINUED | OUTPATIENT
Start: 2020-11-19 | End: 2020-11-19 | Stop reason: HOSPADM

## 2020-11-19 RX ORDER — LISINOPRIL 20 MG/1
20 TABLET ORAL DAILY
Status: DISCONTINUED | OUTPATIENT
Start: 2020-11-19 | End: 2020-11-21 | Stop reason: HOSPADM

## 2020-11-19 RX ORDER — LIDOCAINE HYDROCHLORIDE 10 MG/ML
0.5 INJECTION, SOLUTION EPIDURAL; INFILTRATION; INTRACAUDAL; PERINEURAL ONCE AS NEEDED
Status: COMPLETED | OUTPATIENT
Start: 2020-11-19 | End: 2020-11-19

## 2020-11-19 RX ORDER — GABAPENTIN 300 MG/1
300 CAPSULE ORAL 3 TIMES DAILY
Status: DISCONTINUED | OUTPATIENT
Start: 2020-11-19 | End: 2020-11-21 | Stop reason: HOSPADM

## 2020-11-19 RX ORDER — DOCUSATE SODIUM 100 MG/1
100 CAPSULE, LIQUID FILLED ORAL 2 TIMES DAILY
Status: DISCONTINUED | OUTPATIENT
Start: 2020-11-19 | End: 2020-11-21 | Stop reason: HOSPADM

## 2020-11-19 RX ADMIN — PHENYLEPHRINE HYDROCHLORIDE 160 MCG: 10 INJECTION INTRAVENOUS at 08:01

## 2020-11-19 RX ADMIN — FENTANYL CITRATE 50 MCG: 0.05 INJECTION, SOLUTION INTRAMUSCULAR; INTRAVENOUS at 10:19

## 2020-11-19 RX ADMIN — GLYCOPYRROLATE 0.2 MG: 0.2 INJECTION INTRAMUSCULAR; INTRAVENOUS at 08:07

## 2020-11-19 RX ADMIN — CEFAZOLIN 2 G: 10 INJECTION, POWDER, FOR SOLUTION INTRAVENOUS at 22:12

## 2020-11-19 RX ADMIN — METRONIDAZOLE 500 MG: 500 INJECTION, SOLUTION INTRAVENOUS at 08:03

## 2020-11-19 RX ADMIN — CARVEDILOL 12.5 MG: 12.5 TABLET, FILM COATED ORAL at 18:07

## 2020-11-19 RX ADMIN — DEXAMETHASONE SODIUM PHOSPHATE 6 MG: 10 INJECTION INTRAMUSCULAR; INTRAVENOUS at 08:13

## 2020-11-19 RX ADMIN — CEFAZOLIN SODIUM 2 G: 2 INJECTION, SOLUTION INTRAVENOUS at 07:49

## 2020-11-19 RX ADMIN — METRONIDAZOLE 500 MG: 500 INJECTION, SOLUTION INTRAVENOUS at 15:08

## 2020-11-19 RX ADMIN — PHENYLEPHRINE HYDROCHLORIDE 80 MCG: 10 INJECTION INTRAVENOUS at 07:54

## 2020-11-19 RX ADMIN — ROCURONIUM BROMIDE 20 MG: 10 INJECTION INTRAVENOUS at 08:43

## 2020-11-19 RX ADMIN — GABAPENTIN 300 MG: 300 CAPSULE ORAL at 15:08

## 2020-11-19 RX ADMIN — GABAPENTIN 300 MG: 300 CAPSULE ORAL at 20:24

## 2020-11-19 RX ADMIN — GABAPENTIN 300 MG: 300 CAPSULE ORAL at 12:31

## 2020-11-19 RX ADMIN — PREGABALIN 75 MG: 75 CAPSULE ORAL at 06:56

## 2020-11-19 RX ADMIN — METOCLOPRAMIDE 10 MG: 5 INJECTION, SOLUTION INTRAMUSCULAR; INTRAVENOUS at 23:07

## 2020-11-19 RX ADMIN — LIDOCAINE HYDROCHLORIDE 50 MG: 10 INJECTION, SOLUTION INFILTRATION; PERINEURAL at 07:53

## 2020-11-19 RX ADMIN — METFORMIN HYDROCHLORIDE 500 MG: 500 TABLET ORAL at 18:06

## 2020-11-19 RX ADMIN — SODIUM CHLORIDE, POTASSIUM CHLORIDE, SODIUM LACTATE AND CALCIUM CHLORIDE 9 ML/HR: 600; 310; 30; 20 INJECTION, SOLUTION INTRAVENOUS at 07:00

## 2020-11-19 RX ADMIN — EPHEDRINE SULFATE 10 MG: 50 INJECTION INTRAMUSCULAR; INTRAVENOUS; SUBCUTANEOUS at 08:08

## 2020-11-19 RX ADMIN — ACETAMINOPHEN 1000 MG: 500 TABLET ORAL at 06:56

## 2020-11-19 RX ADMIN — DOCUSATE SODIUM 100 MG: 100 CAPSULE ORAL at 12:31

## 2020-11-19 RX ADMIN — GLYCOPYRROLATE 0.6 MG: 0.2 INJECTION INTRAMUSCULAR; INTRAVENOUS at 09:28

## 2020-11-19 RX ADMIN — BISACODYL 10 MG: 5 TABLET, COATED ORAL at 12:31

## 2020-11-19 RX ADMIN — FENTANYL CITRATE 50 MCG: 0.05 INJECTION, SOLUTION INTRAMUSCULAR; INTRAVENOUS at 11:12

## 2020-11-19 RX ADMIN — ATORVASTATIN CALCIUM 10 MG: 10 TABLET, FILM COATED ORAL at 12:34

## 2020-11-19 RX ADMIN — DOCUSATE SODIUM 100 MG: 100 CAPSULE ORAL at 20:24

## 2020-11-19 RX ADMIN — METOCLOPRAMIDE 10 MG: 5 INJECTION, SOLUTION INTRAMUSCULAR; INTRAVENOUS at 12:34

## 2020-11-19 RX ADMIN — ROCURONIUM BROMIDE 50 MG: 10 INJECTION INTRAVENOUS at 07:54

## 2020-11-19 RX ADMIN — Medication 1 PATCH: at 20:24

## 2020-11-19 RX ADMIN — PHENYLEPHRINE HYDROCHLORIDE 160 MCG: 10 INJECTION INTRAVENOUS at 08:05

## 2020-11-19 RX ADMIN — PROPOFOL 200 MG: 10 INJECTION, EMULSION INTRAVENOUS at 07:53

## 2020-11-19 RX ADMIN — BUPIVACAINE HYDROCHLORIDE 60 ML: 2.5 INJECTION, SOLUTION EPIDURAL; INFILTRATION; INTRACAUDAL; PERINEURAL at 07:56

## 2020-11-19 RX ADMIN — SODIUM CHLORIDE, POTASSIUM CHLORIDE, SODIUM LACTATE AND CALCIUM CHLORIDE 150 ML/HR: 600; 310; 30; 20 INJECTION, SOLUTION INTRAVENOUS at 12:20

## 2020-11-19 RX ADMIN — CEFAZOLIN 2 G: 10 INJECTION, POWDER, FOR SOLUTION INTRAVENOUS at 18:09

## 2020-11-19 RX ADMIN — INSULIN LISPRO 2 UNITS: 100 INJECTION, SOLUTION INTRAVENOUS; SUBCUTANEOUS at 20:24

## 2020-11-19 RX ADMIN — EPHEDRINE SULFATE 10 MG: 50 INJECTION INTRAMUSCULAR; INTRAVENOUS; SUBCUTANEOUS at 08:03

## 2020-11-19 RX ADMIN — MELOXICAM 15 MG: 15 TABLET ORAL at 06:56

## 2020-11-19 RX ADMIN — METOCLOPRAMIDE 10 MG: 5 INJECTION, SOLUTION INTRAMUSCULAR; INTRAVENOUS at 18:07

## 2020-11-19 RX ADMIN — FENTANYL CITRATE 50 MCG: 0.05 INJECTION, SOLUTION INTRAMUSCULAR; INTRAVENOUS at 09:55

## 2020-11-19 RX ADMIN — LIDOCAINE HYDROCHLORIDE 0.4 ML: 10 INJECTION, SOLUTION EPIDURAL; INFILTRATION; INTRACAUDAL; PERINEURAL at 06:57

## 2020-11-19 RX ADMIN — CEFAZOLIN 2 G: 10 INJECTION, POWDER, FOR SOLUTION INTRAVENOUS at 07:25

## 2020-11-19 RX ADMIN — ONDANSETRON 4 MG: 2 INJECTION INTRAMUSCULAR; INTRAVENOUS at 09:20

## 2020-11-19 RX ADMIN — PHENYLEPHRINE HYDROCHLORIDE 80 MCG: 10 INJECTION INTRAVENOUS at 08:08

## 2020-11-19 RX ADMIN — SODIUM CHLORIDE, POTASSIUM CHLORIDE, SODIUM LACTATE AND CALCIUM CHLORIDE: 600; 310; 30; 20 INJECTION, SOLUTION INTRAVENOUS at 09:30

## 2020-11-19 RX ADMIN — DEXAMETHASONE SODIUM PHOSPHATE 4 MG: 10 INJECTION INTRAMUSCULAR; INTRAVENOUS at 07:56

## 2020-11-19 RX ADMIN — METRONIDAZOLE 500 MG: 500 INJECTION, SOLUTION INTRAVENOUS at 23:07

## 2020-11-19 RX ADMIN — PHENYLEPHRINE HYDROCHLORIDE 0.2 MCG/KG/MIN: 10 INJECTION INTRAVENOUS at 08:27

## 2020-11-19 RX ADMIN — METHYLNALTREXONE BROMIDE 4 MG: 12 INJECTION, SOLUTION SUBCUTANEOUS at 12:35

## 2020-11-19 RX ADMIN — FAMOTIDINE 20 MG: 10 INJECTION INTRAVENOUS at 07:02

## 2020-11-19 RX ADMIN — HYDROCODONE BITARTRATE AND ACETAMINOPHEN 1 TABLET: 10; 325 TABLET ORAL at 18:06

## 2020-11-19 RX ADMIN — FENTANYL CITRATE 50 MCG: 0.05 INJECTION, SOLUTION INTRAMUSCULAR; INTRAVENOUS at 11:03

## 2020-11-19 RX ADMIN — NEOSTIGMINE 5 MG: 1 INJECTION INTRAVENOUS at 09:28

## 2020-11-19 RX ADMIN — HYDROCODONE BITARTRATE AND ACETAMINOPHEN 1 TABLET: 10; 325 TABLET ORAL at 12:31

## 2020-11-19 RX ADMIN — LISINOPRIL 20 MG: 20 TABLET ORAL at 12:33

## 2020-11-19 NOTE — OP NOTE
OPERATIVE NOTE    Patient Name:  Damion Llanos  YOB: 1955  [id number]    Date of Surgery:  11/19/2020      PREOPERATIVE DIAGNOSIS:   1.  Perforated bowel  2.  Parastomal hernia      POSTOPERATIVE DIAGNOSIS: Same        PROCEDURE PERFORMED:   1.  Ileostomy takedown  2.  Repair of parastomal hernia with mesh       SURGEON: El Nugent MD       Circulator: Jyoti Gilliam RN  Physician Assistant: Latonia Haskins PA-C  Scrub Person: Silvia Sandhu  Nursing Assistant: Joelle Aiken PCT        SPECIMENS: Old ileostomy and mucous fistula       ANESTHESIA: General.        FINDINGS:   1.  Significant parastomal hernia containing loops of small bowel which was reduced    2.  Prior ileostomy mucous fistula taken down with a stapled anastomosis.    3.  Primary closure of parastomal hernia with reinforcement of Phasix ST mesh in the retrorectus space       INDICATIONS: The patient is a 65 y.o. male who presented with previously perforated bowel approximately 6 months ago.  This was treated with bowel resection ileostomy and mucous fistula.  He is now recovered and is ready for ileostomy takedown.  The risks and benefits were discussed at length with the patient, who agreed to proceed.       DESCRIPTION OF PROCEDURE:      After obtaining informed consent, the patient was taken to the operating room and placed in supine  position. After appropriate DVT and antibiotic prophylaxis, general anesthesia was induced. TAP blocks were placed by the anesthesia staff bilaterally to aid in post-op pain control . The abdomen  was prepped and draped in standard sterile fashion, and an elliptical incision made around the previous ileostomy mucous fistula.  Electrocautery dissection was carried down below the dermis.      There was a significant parastomal hernia that was open.  There were loops of small bowel which was reduced.  The ileostomy and mucous fistula were then circumferentially freed of the  fascia.  Further dissection into the abdominal cavity was carried out to free up adhesions in the liver both the proximal transverse colon as well as the terminal ileum into the wound.  Both were viable without injury.  An appropriate resection spot on both the proximal transverse colon as well as the distal terminal ileum to resect the ileostomy and mucous fistula site upon.  Each were transected using a stapling device.    At this point, a stapled side-to-side, functional end-to-end anastomosis tween the terminal ileum and the transverse colon was then performed.  At the completion of the anastomosis it was widely patent.  All staple lines were oversewn with silk sutures in Lembert fashion.  He was returned to the abdominal cavity.  The operating team then changed their gowns and gloves.      Given the size of the parastomal hernia, the decision was made to repair the fascia more completely than simple closure.  The posterior fascia was then dissected off of the rectus muscle and anterior fascia circumferentially.  The abdomen was irrigated with saline until clear.  The posterior fascia was then closed primarily using interrupted 0 PDS sutures.  A piece of Phasix ST mesh was then placed in the retrorectus space, with good overlap in all directions of the fascial closure.  The rectus muscle was reapproximated using observable sutures in the midline, and then the anterior fascia closed independently using interrupted 0 PDS sutures.    The hernia sac was resected from the subcutaneous tissues and discarded.  The wound was irrigated with antibiotic saline, and then the skin loosely closed using nylon sutures.  The interstices between the sutures were then packed with saline soaked gauze, and it was covered with a dry sterile dressing.    All sponge and needle counts were correct times two at the completion of the procedure. The patient recovered from anesthesia, was extubated in the operating room  and was transported  to the PACU  in stable condition.      El Nugent MD  11/19/2020  09:29 EST

## 2020-11-19 NOTE — ANESTHESIA PREPROCEDURE EVALUATION
Anesthesia Evaluation     Patient summary reviewed and Nursing notes reviewed   no history of anesthetic complications:  NPO Solid Status: > 8 hours  NPO Liquid Status: > 2 hours           Airway   Mallampati: II  TM distance: >3 FB  Neck ROM: full  No difficulty expected  Dental - normal exam     Pulmonary    (+) a smoker Current, COPD,   Cardiovascular - normal exam    ECG reviewed    (+) hypertension, hyperlipidemia,   (-) valvular problems/murmurs, dysrhythmias, angina    ROS comment: Echo 12/17: normal EF, no significant valve disease    Neuro/Psych  (+) psychiatric history,     GI/Hepatic/Renal/Endo    (+) morbid obesity, GERD,  diabetes mellitus,     Musculoskeletal     Abdominal    Substance History   (+) alcohol use (h/o EtOH abuse),      OB/GYN negative ob/gyn ROS         Other   arthritis,                      Anesthesia Plan    ASA 3     general   (+/- tap blocks)  intravenous induction     Anesthetic plan, all risks, benefits, and alternatives have been provided, discussed and informed consent has been obtained with: patient.    Plan discussed with CRNA.

## 2020-11-19 NOTE — ANESTHESIA PROCEDURE NOTES
Peripheral Block      Patient reassessed immediately prior to procedure    Patient location during procedure: OR  Reason for block: at surgeon's request and post-op pain management  Performed by  CRNA: Latesha Blandon, CRNA  Assisted by: Lauren Tadeo MD  Preanesthetic Checklist  Completed: patient identified, site marked, surgical consent, pre-op evaluation, timeout performed, IV checked, risks and benefits discussed and monitors and equipment checked  Prep:  Pt Position: supine  Sterile barriers:cap, gloves, sterile barriers and mask  Prep: ChloraPrep  Patient monitoring: blood pressure monitoring, continuous pulse oximetry and EKG  Procedure  Sedation:yes  Performed under: general  Guidance:ultrasound guided  Images:still images obtained, printed/placed on chart    Laterality:Bilateral  Block Type:TAP  Injection Technique:single-shot  Needle Type:short-bevel and echogenic  Needle Gauge:20 G  Resistance on Injection: none          Medications  Comment:Block Injection:  LA dose divided between Right and Left block        Post Assessment  Injection Assessment: negative aspiration for heme, incremental injection and no paresthesia on injection  Patient Tolerance:comfortable throughout block  Complications:no  Additional Notes      Under Ultrasound guidance, a BBraun 4inch 360 degree needle was advanced with Normal Saline hydro dissection of tissue.  The Internal Oblique and Transversus Abdominus muscles where visualized.  At or before the aponeurosis of Internal Oblique, local anesthetic spread was visualized in the Transversus Abdominus Plane. Injection was made incrementally with aspiration every 5 mls.  There was no  intravascular injection,  injection pressure was normal, there was no neural injection, and the procedure was completed without difficulty.  Thank You.

## 2020-11-19 NOTE — ANESTHESIA PROCEDURE NOTES
Airway  Urgency: elective    Date/Time: 11/19/2020 7:59 AM  Airway not difficult    General Information and Staff    Patient location during procedure: OR  CRNA: Radha Kelley CRNA    Indications and Patient Condition  Indications for airway management: airway protection    Preoxygenated: yes  MILS not maintained throughout  Mask difficulty assessment: 1 - vent by mask    Final Airway Details  Final airway type: endotracheal airway      Successful airway: ETT  Cuffed: yes   Successful intubation technique: direct laryngoscopy  Facilitating devices/methods: intubating stylet  Endotracheal tube insertion site: oral  Blade: Jenna  Blade size: 4  ETT size (mm): 7.5  Cormack-Lehane Classification: grade I - full view of glottis  Placement verified by: chest auscultation and capnometry   Measured from: lips  ETT/EBT  to lips (cm): 20  Number of attempts at approach: 1  Assessment: lips, teeth, and gum same as pre-op and atraumatic intubation    Additional Comments  Negative epigastric sounds, Breath sound equal bilaterally with symmetric chest rise and fall

## 2020-11-19 NOTE — PLAN OF CARE
Goal Outcome Evaluation:  Plan of Care Reviewed With: patient      ERAS protocol. Received patient from PACU around 1200. Patient ambulated from stretcher to the bed. Provided instructions on how to perform incentive spirometer and educated patient on ambulation and chewing gum. Incision/woound on right quadrant is CDI with Covaderm in place. Minor complaints of pain covered by prns. Will continue to monitor and reassess.

## 2020-11-19 NOTE — PROGRESS NOTES
"Patient Name:  Damion Llanos  YOB: 1955  [id number]    Surgery Post - Operative Note    Date of visit: 11/19/2020    Subjective   Subjective: Feels OK, pain controlled.       Objective     Objective:    /86 (BP Location: Right arm, Patient Position: Lying)   Pulse 74   Temp 97.9 °F (36.6 °C) (Oral)   Resp 16   Ht 172.7 cm (68\")   Wt 101 kg (223 lb)   SpO2 95%   BMI 33.91 kg/m²     CV:  Rate  regular and rhythm  regular  L:  Clear  to auscultation bilaterally   ABD:  Soft, appropriately tender. Dressings  clean, dry and intact   EXT:  No cyanosis, clubbing or edema         Assessment/Plan     Assessment/ Plan: Doing well after ileostomy takedown. Continue Pulmonary toilet    Hospital Problem List     Perforated bowel (CMS/HCC)    Tobacco abuse    Chronic obstructive pulmonary disease with (acute) exacerbation (CMS/HCC)        Essential hypertension        Type 2 diabetes mellitus with complication, without long-term current use of insulin (CMS/[facility])                H/O ileostomy              lE Nugent MD  11/19/2020  17:23 EST    "

## 2020-11-19 NOTE — PAYOR COMM NOTE
"Rhonda Blue RN CM  Phone [phone]  Fax [phone]    Chris Llanos Ir (65 y.o. Male)     Date of Birth Social Security Number Address Home Phone MRN    1955  [address] [facility] [address] [phone] [id number]    Gnosticism Marital Status          None        Admission Date Admission Type Admitting Provider Attending Provider Department, Room/Bed    11/19/20 Elective El Nugent MD Shane, Matthew D., MD [facility] 2F, S213/1    Discharge Date Discharge Disposition Discharge Destination                       Attending Provider: El Nugent MD    Allergies: No Known Allergies    Isolation: None   Infection: None   Code Status: CPR    Ht: 172.7 cm (68\")   Wt: 101 kg (223 lb)    Admission Cmt: None   Principal Problem: None                Active Insurance as of 11/19/2020     Primary Coverage     Payor Plan Insurance Group Employer/Plan Group    MEDICARE MEDICARE A & B      Payor Plan Address Payor Plan Phone Number Payor Plan Fax Number Effective Dates    [address] [phone]  9/1/2018 - None Entered    [facility] [id number]       Subscriber Name Subscriber Birth Date Member ID       CHRIS LLANOS IR 1955 [id number]           Secondary Coverage     Payor Plan Insurance Group Employer/Plan Group    [employer] [place]ARE [place]ICA[place]      Payor Plan Address Payor Plan Phone Number Payor Plan Fax Number Effective Dates    [address] [address] [phone]  11/19/2020 - None Entered    [facility] [id number]       Subscriber Name Subscriber Birth Date Member ID       CHRIS LLANOS IR 1955 [id number]                 Emergency Contacts      (Rel.) Home Phone Work Phone Mobile Phone    chris Llanos (Son) [phone] -- --    lele parker (Friend) -- -- [phone]               History & Physical      Abril Morley APRN at 11/19/20 0706     Attestation signed by El Nugent MD at 11/19/20 0732      I agree with the updated " history and physical.    El Nugent MD                        Pre-Op H&P  Damion Llanos  [id number]  1955      Chief complaint: hx perforated bowel      Subjective:  Patient is a 65 y.o.male presents for scheduled surgery by Dr. Nugent. He anticipates an ileostomy takedown today. He was hospitalized 4/21/2020 for abd pain and perforated bowel. He underwent emergent surgery at that time for laparotomy with end ileostomy placement.      Review of Systems:  Constitutional-- No fever, chills or sweats. No fatigue.  CV-- No chest pain, palpitation or syncope. +HTN, HLD  Resp-- No SOB, cough, hemoptysis  Skin--No rashes or lesions      Allergies: No Known Allergies      Home Meds:  Medications Prior to Admission   Medication Sig Dispense Refill Last Dose   • albuterol (PROVENTIL HFA;VENTOLIN HFA) 108 (90 Base) MCG/ACT inhaler Inhale 2 puffs Every 4 (Four) Hours As Needed for Wheezing.      • allopurinol (ZYLOPRIM) 300 MG tablet Take 300 mg by mouth Daily.      • carvedilol (COREG) 12.5 MG tablet Take 12.5 mg by mouth 2 (Two) Times a Day With Meals.      • ferrous sulfate 325 (65 FE) MG tablet Take 325 mg by mouth Every Other Day.      • furosemide (LASIX) 40 MG tablet Take 40 mg by mouth Daily.      • gabapentin (NEURONTIN) 300 MG capsule Take 300 mg by mouth 3 (Three) Times a Day.      • HYDROcodone-acetaminophen (NORCO)  MG per tablet Take 1 tablet by mouth Every 8 (Eight) Hours As Needed for Moderate Pain .      • lisinopril (PRINIVIL,ZESTRIL) 40 MG tablet Take 20 mg by mouth Every Morning.      • metFORMIN (GLUCOPHAGE) 500 MG tablet Take 500 mg by mouth 2 (Two) Times a Day With Meals.      • pantoprazole (PROTONIX) 40 MG EC tablet Take 40 mg by mouth Daily.      • simvastatin (ZOCOR) 20 MG tablet Take 20 mg by mouth Every Night.            PMH:   Past Medical History:   Diagnosis Date   • Anemia    • Arthritis    • Diabetes mellitus (CMS/HCC)    • GERD (gastroesophageal reflux disease)    • Gout   "  • Hyperlipidemia    • Hypertension    • Wears glasses     reading      PSH:    Past Surgical History:   Procedure Laterality Date   • COLONOSCOPY  11/2020   • ENDOSCOPY N/A 12/17/2017    Procedure: ESOPHAGOGASTRODUODENOSCOPY;  Surgeon: Mark I Brunner, MD;  Location: [facility] ENDOSCOPY;  Service:    • EXPLORATORY LAPAROTOMY N/A 4/22/2020    Procedure: LAPAROTOMY EXPLORATORY, RIGHT HEMICOLECTOMY, ILEOSTOMY, MUCUS FISTULA;  Surgeon: El Nugent MD;  Location: [facility] OR;  Service: General;  Laterality: N/A;   • HERNIA REPAIR         Immunization History:  Influenza: 2020  Pneumococcal: Yes  Tetanus: Yes      Social History:   Tobacco:   Social History     Tobacco Use   Smoking Status Current Every Day Smoker   • Packs/day: 1.00   • Years: 50.00   • Pack years: 50.00   • Types: Cigarettes   Smokeless Tobacco Never Used      Alcohol:     Social History     Substance and Sexual Activity   Alcohol Use Yes   • Alcohol/week: 49.0 standard drinks   • Types: 49 Cans of beer per week    Comment: 6-7 BEERS DAILY         Physical Exam:/69 (BP Location: Right arm, Patient Position: Lying)   Pulse 69   Temp 97.2 °F (36.2 °C) (Tympanic)   Resp 18   Ht 172.7 cm (68\")   Wt 101 kg (223 lb)   SpO2 97%   BMI 33.91 kg/m²       General Appearance:    Alert, cooperative, no distress, appears stated age   Head:    Normocephalic, without obvious abnormality, atraumatic   Lungs:     Clear to auscultation bilaterally, respirations unlabored    Heart:   Regular rate and rhythm, S1 and S2 normal, no murmur, rub    or gallop    Abdomen:    Soft without tenderness   Breast Exam:    deferred   Genitalia:    deferred   Extremities:   Extremities normal, atraumatic, no cyanosis or edema   Skin:   Skin color, texture, turgor normal, no rashes or lesions   Neurologic:   Grossly intact     Results Review:     LABS:  Lab Results   Component Value Date    WBC 6.04 11/17/2020    HGB 13.1 11/17/2020    HCT 39.5 11/17/2020    MCV 99.0 (H) " 11/17/2020     11/17/2020    NEUTROABS 6.98 04/25/2020    GLUCOSE 101 (H) 11/17/2020    BUN 10 11/17/2020    CREATININE 0.65 (L) 11/17/2020    EGFRIFNONA 123 11/17/2020     (L) 11/17/2020    K 4.8 11/17/2020     11/17/2020    CO2 20.0 (L) 11/17/2020    MG 1.9 04/23/2020    CALCIUM 9.6 11/17/2020    ALBUMIN 4.30 04/21/2020    AST 20 04/21/2020    ALT 18 04/21/2020    BILITOT 0.7 04/21/2020     SARS-CoV-2 ALLISON   Not Detected Not Detected        RADIOLOGY:  Study Result    EXAMINATION: XR CHEST PA AND LATERAL-      INDICATION: preop      COMPARISON: 4/23/2020     FINDINGS: No focal airspace consolidation. No pleural effusion or  pneumothorax. Mild thoracic spondylosis changes present.     IMPRESSION:  No evidence of acute disease in the chest       I reviewed the patient's new clinical results.    Cancer Staging (if applicable)  Cancer Patient: __ yes __no __unknown; If yes, clinical stage T:__ N:__M:__, stage group or __N/A      Impression: hx perforated bowel      Plan: ileostomy takedown      MIKAYLA Martinez   11/19/2020   07:06 EST    Electronically signed by El Nugent MD at 11/19/20 0732          Operative/Procedure Notes (last 24 hours) (Notes from 11/18/20 1440 through 11/19/20 1440)      El Nugent MD at 11/19/20 0811          OPERATIVE NOTE    Patient Name:  Damion Llanos  YOB: 1955  [id number]    Date of Surgery:  11/19/2020      PREOPERATIVE DIAGNOSIS:   1.  Perforated bowel  2.  Parastomal hernia      POSTOPERATIVE DIAGNOSIS: Same        PROCEDURE PERFORMED:   1.  Ileostomy takedown  2.  Repair of parastomal hernia with mesh       SURGEON: El Nugent MD       Circulator: Jyoti Gilliam RN  Physician Assistant: Latonia Haskins PA-C  Scrub Person: Silvia Sandhu  Nursing Assistant: Joelle Aiken PCT        SPECIMENS: Old ileostomy and mucous fistula       ANESTHESIA: General.        FINDINGS:   1.  Significant parastomal hernia  containing loops of small bowel which was reduced    2.  Prior ileostomy mucous fistula taken down with a stapled anastomosis.    3.  Primary closure of parastomal hernia with reinforcement of Phasix ST mesh in the retrorectus space       INDICATIONS: The patient is a 65 y.o. male who presented with previously perforated bowel approximately 6 months ago.  This was treated with bowel resection ileostomy and mucous fistula.  He is now recovered and is ready for ileostomy takedown.  The risks and benefits were discussed at length with the patient, who agreed to proceed.       DESCRIPTION OF PROCEDURE:      After obtaining informed consent, the patient was taken to the operating room and placed in supine  position. After appropriate DVT and antibiotic prophylaxis, general anesthesia was induced. TAP blocks were placed by the anesthesia staff bilaterally to aid in post-op pain control . The abdomen  was prepped and draped in standard sterile fashion, and an elliptical incision made around the previous ileostomy mucous fistula.  Electrocautery dissection was carried down below the dermis.      There was a significant parastomal hernia that was open.  There were loops of small bowel which was reduced.  The ileostomy and mucous fistula were then circumferentially freed of the fascia.  Further dissection into the abdominal cavity was carried out to free up adhesions in the liver both the proximal transverse colon as well as the terminal ileum into the wound.  Both were viable without injury.  An appropriate resection spot on both the proximal transverse colon as well as the distal terminal ileum to resect the ileostomy and mucous fistula site upon.  Each were transected using a stapling device.    At this point, a stapled side-to-side, functional end-to-end anastomosis tween the terminal ileum and the transverse colon was then performed.  At the completion of the anastomosis it was widely patent.  All staple lines were  oversewn with silk sutures in Lembert fashion.  He was returned to the abdominal cavity.  The operating team then changed their gowns and gloves.      Given the size of the parastomal hernia, the decision was made to repair the fascia more completely than simple closure.  The posterior fascia was then dissected off of the rectus muscle and anterior fascia circumferentially.  The abdomen was irrigated with saline until clear.  The posterior fascia was then closed primarily using interrupted 0 PDS sutures.  A piece of Phasix ST mesh was then placed in the retrorectus space, with good overlap in all directions of the fascial closure.  The rectus muscle was reapproximated using observable sutures in the midline, and then the anterior fascia closed independently using interrupted 0 PDS sutures.    The hernia sac was resected from the subcutaneous tissues and discarded.  The wound was irrigated with antibiotic saline, and then the skin loosely closed using nylon sutures.  The interstices between the sutures were then packed with saline soaked gauze, and it was covered with a dry sterile dressing.    All sponge and needle counts were correct times two at the completion of the procedure. The patient recovered from anesthesia, was extubated in the operating room  and was transported to the PACU  in stable condition.      El Nugent MD  11/19/2020  09:29 EST            Electronically signed by El Nugent MD at 11/19/20 0933     El Nugent MD at 11/19/20 0811          ILEOSTOMY TAKEDOWN  Progress Note    Damion Llanos  11/19/2020    Pre-op Diagnosis:   1. Perforated bowel  2. Parastomal hernia       Post-Op Diagnosis Codes:   Same    Procedure/CPT® Codes:        Procedure(s):  ILEOSTOMY TAKEDOWN AND REPAIR OF PARASTOMAL HERNIA WITH MESH    Surgeon(s):  El Nugent MD    Anesthesia: General    Staff:   Circulator: Jyoti Gilliam RN  Physician Assistant: Latonia Haskins PA-C  Scrub  Person: Silvia Sandhu  Nursing Assistant: Joelle Aiken PCT         Estimated Blood Loss: minimal    Urine Voided: * No values recorded between 11/19/2020  7:47 AM and 11/19/2020  9:24 AM *    Specimens:                Specimens     ID Source Type Tests Collected By Collected At Frozen?      A Large Intestine, Right / Ascending Colon Tissue · TISSUE PATHOLOGY EXAM   El Nugent MD 11/19/20 0816 No     Description: ILEOSTOMY                Drains:   Ileostomy Standard (Mary, end) RUQ (Active)       Findings:   1.  Significant parastomal hernia containing loops of small bowel which was reduced  2.  Prior ileostomy mucous fistula taken down with a stapled anastomosis.  3.  Primary closure of parastomal hernia with reinforcement of Phasix ST mesh in the retrorectus space    Complications: None          El Nugent MD     Date: 11/19/2020  Time: 09:27 EST        Electronically signed by El Nugent MD at 11/19/20 0944

## 2020-11-19 NOTE — H&P
Pre-Op H&P  Damion Llanos  [id number]  1955      Chief complaint: hx perforated bowel      Subjective:  Patient is a 65 y.o.male presents for scheduled surgery by Dr. Nugent. He anticipates an ileostomy takedown today. He was hospitalized 4/21/2020 for abd pain and perforated bowel. He underwent emergent surgery at that time for laparotomy with end ileostomy placement.      Review of Systems:  Constitutional-- No fever, chills or sweats. No fatigue.  CV-- No chest pain, palpitation or syncope. +HTN, HLD  Resp-- No SOB, cough, hemoptysis  Skin--No rashes or lesions      Allergies: No Known Allergies      Home Meds:  Medications Prior to Admission   Medication Sig Dispense Refill Last Dose   • albuterol (PROVENTIL HFA;VENTOLIN HFA) 108 (90 Base) MCG/ACT inhaler Inhale 2 puffs Every 4 (Four) Hours As Needed for Wheezing.      • allopurinol (ZYLOPRIM) 300 MG tablet Take 300 mg by mouth Daily.      • carvedilol (COREG) 12.5 MG tablet Take 12.5 mg by mouth 2 (Two) Times a Day With Meals.      • ferrous sulfate 325 (65 FE) MG tablet Take 325 mg by mouth Every Other Day.      • furosemide (LASIX) 40 MG tablet Take 40 mg by mouth Daily.      • gabapentin (NEURONTIN) 300 MG capsule Take 300 mg by mouth 3 (Three) Times a Day.      • HYDROcodone-acetaminophen (NORCO)  MG per tablet Take 1 tablet by mouth Every 8 (Eight) Hours As Needed for Moderate Pain .      • lisinopril (PRINIVIL,ZESTRIL) 40 MG tablet Take 20 mg by mouth Every Morning.      • metFORMIN (GLUCOPHAGE) 500 MG tablet Take 500 mg by mouth 2 (Two) Times a Day With Meals.      • pantoprazole (PROTONIX) 40 MG EC tablet Take 40 mg by mouth Daily.      • simvastatin (ZOCOR) 20 MG tablet Take 20 mg by mouth Every Night.            PMH:   Past Medical History:   Diagnosis Date   • Anemia    • Arthritis    • Diabetes mellitus (CMS/HCC)    • GERD (gastroesophageal reflux disease)    • Gout    • Hyperlipidemia    • Hypertension    • Wears glasses     reading   "    PSH:    Past Surgical History:   Procedure Laterality Date   • COLONOSCOPY  11/2020   • ENDOSCOPY N/A 12/17/2017    Procedure: ESOPHAGOGASTRODUODENOSCOPY;  Surgeon: Mark I Brunner, MD;  Location: [facility] ENDOSCOPY;  Service:    • EXPLORATORY LAPAROTOMY N/A 4/22/2020    Procedure: LAPAROTOMY EXPLORATORY, RIGHT HEMICOLECTOMY, ILEOSTOMY, MUCUS FISTULA;  Surgeon: El Nugent MD;  Location: [facility] OR;  Service: General;  Laterality: N/A;   • HERNIA REPAIR         Immunization History:  Influenza: 2020  Pneumococcal: Yes  Tetanus: Yes      Social History:   Tobacco:   Social History     Tobacco Use   Smoking Status Current Every Day Smoker   • Packs/day: 1.00   • Years: 50.00   • Pack years: 50.00   • Types: Cigarettes   Smokeless Tobacco Never Used      Alcohol:     Social History     Substance and Sexual Activity   Alcohol Use Yes   • Alcohol/week: 49.0 standard drinks   • Types: 49 Cans of beer per week    Comment: 6-7 BEERS DAILY         Physical Exam:/69 (BP Location: Right arm, Patient Position: Lying)   Pulse 69   Temp 97.2 °F (36.2 °C) (Tympanic)   Resp 18   Ht 172.7 cm (68\")   Wt 101 kg (223 lb)   SpO2 97%   BMI 33.91 kg/m²       General Appearance:    Alert, cooperative, no distress, appears stated age   Head:    Normocephalic, without obvious abnormality, atraumatic   Lungs:     Clear to auscultation bilaterally, respirations unlabored    Heart:   Regular rate and rhythm, S1 and S2 normal, no murmur, rub    or gallop    Abdomen:    Soft without tenderness   Breast Exam:    deferred   Genitalia:    deferred   Extremities:   Extremities normal, atraumatic, no cyanosis or edema   Skin:   Skin color, texture, turgor normal, no rashes or lesions   Neurologic:   Grossly intact     Results Review:     LABS:  Lab Results   Component Value Date    WBC 6.04 11/17/2020    HGB 13.1 11/17/2020    HCT 39.5 11/17/2020    MCV 99.0 (H) 11/17/2020     11/17/2020    NEUTROABS 6.98 04/25/2020    " GLUCOSE 101 (H) 11/17/2020    BUN 10 11/17/2020    CREATININE 0.65 (L) 11/17/2020    EGFRIFNONA 123 11/17/2020     (L) 11/17/2020    K 4.8 11/17/2020     11/17/2020    CO2 20.0 (L) 11/17/2020    MG 1.9 04/23/2020    CALCIUM 9.6 11/17/2020    ALBUMIN 4.30 04/21/2020    AST 20 04/21/2020    ALT 18 04/21/2020    BILITOT 0.7 04/21/2020     SARS-CoV-2 ALLISON   Not Detected Not Detected        RADIOLOGY:  Study Result    EXAMINATION: XR CHEST PA AND LATERAL-      INDICATION: preop      COMPARISON: 4/23/2020     FINDINGS: No focal airspace consolidation. No pleural effusion or  pneumothorax. Mild thoracic spondylosis changes present.     IMPRESSION:  No evidence of acute disease in the chest       I reviewed the patient's new clinical results.    Cancer Staging (if applicable)  Cancer Patient: __ yes __no __unknown; If yes, clinical stage T:__ N:__M:__, stage group or __N/A      Impression: hx perforated bowel      Plan: ileostomy takedown      Abril Morley, APRN   11/19/2020   07:06 EST

## 2020-11-20 LAB
ANION GAP SERPL CALCULATED.3IONS-SCNC: 13 MMOL/L (ref 5–15)
BASOPHILS # BLD MANUAL: 0 10*3/MM3 (ref 0–0.2)
BASOPHILS NFR BLD AUTO: 0 % (ref 0–1.5)
BUN SERPL-MCNC: 7 MG/DL (ref 8–23)
BUN/CREAT SERPL: 10.3 (ref 7–25)
CALCIUM SPEC-SCNC: 8.7 MG/DL (ref 8.6–10.5)
CHLORIDE SERPL-SCNC: 100 MMOL/L (ref 98–107)
CO2 SERPL-SCNC: 23 MMOL/L (ref 22–29)
CREAT SERPL-MCNC: 0.68 MG/DL (ref 0.76–1.27)
CYTO UR: NORMAL
DEPRECATED RDW RBC AUTO: 45.4 FL (ref 37–54)
EOSINOPHIL # BLD MANUAL: 0 10*3/MM3 (ref 0–0.4)
EOSINOPHIL NFR BLD MANUAL: 0 % (ref 0.3–6.2)
ERYTHROCYTE [DISTWIDTH] IN BLOOD BY AUTOMATED COUNT: 12.5 % (ref 12.3–15.4)
GFR SERPL CREATININE-BSD FRML MDRD: 116 ML/MIN/1.73
GLUCOSE BLDC GLUCOMTR-MCNC: 124 MG/DL (ref 70–130)
GLUCOSE BLDC GLUCOMTR-MCNC: 128 MG/DL (ref 70–130)
GLUCOSE BLDC GLUCOMTR-MCNC: 135 MG/DL (ref 70–130)
GLUCOSE BLDC GLUCOMTR-MCNC: 140 MG/DL (ref 70–130)
GLUCOSE SERPL-MCNC: 118 MG/DL (ref 65–99)
HCT VFR BLD AUTO: 35.3 % (ref 37.5–51)
HGB BLD-MCNC: 11.8 G/DL (ref 13–17.7)
LAB AP CASE REPORT: NORMAL
LAB AP CLINICAL INFORMATION: NORMAL
LYMPHOCYTES # BLD MANUAL: 0.9 10*3/MM3 (ref 0.7–3.1)
LYMPHOCYTES NFR BLD MANUAL: 7 % (ref 5–12)
LYMPHOCYTES NFR BLD MANUAL: 8 % (ref 19.6–45.3)
MCH RBC QN AUTO: 33.1 PG (ref 26.6–33)
MCHC RBC AUTO-ENTMCNC: 33.4 G/DL (ref 31.5–35.7)
MCV RBC AUTO: 98.9 FL (ref 79–97)
MONOCYTES # BLD AUTO: 0.79 10*3/MM3 (ref 0.1–0.9)
NEUTROPHILS # BLD AUTO: 9.57 10*3/MM3 (ref 1.7–7)
NEUTROPHILS NFR BLD MANUAL: 68 % (ref 42.7–76)
NEUTS BAND NFR BLD MANUAL: 17 % (ref 0–5)
PATH REPORT.FINAL DX SPEC: NORMAL
PATH REPORT.GROSS SPEC: NORMAL
PLAT MORPH BLD: NORMAL
PLATELET # BLD AUTO: 190 10*3/MM3 (ref 140–450)
PMV BLD AUTO: 11.6 FL (ref 6–12)
POTASSIUM SERPL-SCNC: 3.6 MMOL/L (ref 3.5–5.2)
RBC # BLD AUTO: 3.57 10*6/MM3 (ref 4.14–5.8)
RBC MORPH BLD: NORMAL
SODIUM SERPL-SCNC: 136 MMOL/L (ref 136–145)
WBC # BLD AUTO: 11.26 10*3/MM3 (ref 3.4–10.8)
WBC MORPH BLD: NORMAL

## 2020-11-20 PROCEDURE — 85025 COMPLETE CBC W/AUTO DIFF WBC: CPT | Performed by: SURGERY

## 2020-11-20 PROCEDURE — 25010000002 HEPARIN (PORCINE) PER 1000 UNITS: Performed by: SURGERY

## 2020-11-20 PROCEDURE — 85007 BL SMEAR W/DIFF WBC COUNT: CPT | Performed by: SURGERY

## 2020-11-20 PROCEDURE — 25010000002 CEFAZOLIN PER 500 MG: Performed by: SURGERY

## 2020-11-20 PROCEDURE — 25010000002 HYDROMORPHONE PER 4 MG: Performed by: SURGERY

## 2020-11-20 PROCEDURE — 82962 GLUCOSE BLOOD TEST: CPT

## 2020-11-20 PROCEDURE — 80048 BASIC METABOLIC PNL TOTAL CA: CPT | Performed by: SURGERY

## 2020-11-20 PROCEDURE — 25010000002 METOCLOPRAMIDE PER 10 MG: Performed by: SURGERY

## 2020-11-20 RX ORDER — DEXTROSE, SODIUM CHLORIDE, AND POTASSIUM CHLORIDE 5; .45; .15 G/100ML; G/100ML; G/100ML
100 INJECTION INTRAVENOUS CONTINUOUS
Status: DISCONTINUED | OUTPATIENT
Start: 2020-11-20 | End: 2020-11-21 | Stop reason: HOSPADM

## 2020-11-20 RX ADMIN — GABAPENTIN 300 MG: 300 CAPSULE ORAL at 16:25

## 2020-11-20 RX ADMIN — SODIUM CHLORIDE, POTASSIUM CHLORIDE, SODIUM LACTATE AND CALCIUM CHLORIDE 150 ML/HR: 600; 310; 30; 20 INJECTION, SOLUTION INTRAVENOUS at 01:09

## 2020-11-20 RX ADMIN — HEPARIN SODIUM 5000 UNITS: 5000 INJECTION INTRAVENOUS; SUBCUTANEOUS at 20:12

## 2020-11-20 RX ADMIN — CEFAZOLIN 2 G: 10 INJECTION, POWDER, FOR SOLUTION INTRAVENOUS at 06:00

## 2020-11-20 RX ADMIN — METOCLOPRAMIDE 10 MG: 5 INJECTION, SOLUTION INTRAMUSCULAR; INTRAVENOUS at 12:16

## 2020-11-20 RX ADMIN — ALVIMOPAN 12 MG: 12 CAPSULE ORAL at 08:17

## 2020-11-20 RX ADMIN — HYDROCODONE BITARTRATE AND ACETAMINOPHEN 1 TABLET: 10; 325 TABLET ORAL at 14:59

## 2020-11-20 RX ADMIN — HYDROCODONE BITARTRATE AND ACETAMINOPHEN 1 TABLET: 10; 325 TABLET ORAL at 02:45

## 2020-11-20 RX ADMIN — POTASSIUM CHLORIDE, DEXTROSE MONOHYDRATE AND SODIUM CHLORIDE 100 ML/HR: 150; 5; 450 INJECTION, SOLUTION INTRAVENOUS at 20:11

## 2020-11-20 RX ADMIN — ATORVASTATIN CALCIUM 10 MG: 10 TABLET, FILM COATED ORAL at 08:14

## 2020-11-20 RX ADMIN — METRONIDAZOLE 500 MG: 500 INJECTION, SOLUTION INTRAVENOUS at 09:19

## 2020-11-20 RX ADMIN — FERROUS SULFATE TAB 325 MG (65 MG ELEMENTAL FE) 325 MG: 325 (65 FE) TAB at 08:13

## 2020-11-20 RX ADMIN — DOCUSATE SODIUM 100 MG: 100 CAPSULE ORAL at 08:13

## 2020-11-20 RX ADMIN — GABAPENTIN 300 MG: 300 CAPSULE ORAL at 08:12

## 2020-11-20 RX ADMIN — METFORMIN HYDROCHLORIDE 500 MG: 500 TABLET ORAL at 18:00

## 2020-11-20 RX ADMIN — PANTOPRAZOLE SODIUM 40 MG: 40 TABLET, DELAYED RELEASE ORAL at 05:22

## 2020-11-20 RX ADMIN — CARVEDILOL 12.5 MG: 12.5 TABLET, FILM COATED ORAL at 08:13

## 2020-11-20 RX ADMIN — METOCLOPRAMIDE 10 MG: 5 INJECTION, SOLUTION INTRAMUSCULAR; INTRAVENOUS at 05:53

## 2020-11-20 RX ADMIN — FUROSEMIDE 40 MG: 40 TABLET ORAL at 08:12

## 2020-11-20 RX ADMIN — LISINOPRIL 20 MG: 20 TABLET ORAL at 08:14

## 2020-11-20 RX ADMIN — HYDROCODONE BITARTRATE AND ACETAMINOPHEN 1 TABLET: 10; 325 TABLET ORAL at 08:12

## 2020-11-20 RX ADMIN — METFORMIN HYDROCHLORIDE 500 MG: 500 TABLET ORAL at 08:15

## 2020-11-20 RX ADMIN — CARVEDILOL 12.5 MG: 12.5 TABLET, FILM COATED ORAL at 18:00

## 2020-11-20 RX ADMIN — BISACODYL 10 MG: 5 TABLET, COATED ORAL at 08:13

## 2020-11-20 RX ADMIN — DOCUSATE SODIUM 100 MG: 100 CAPSULE ORAL at 20:12

## 2020-11-20 RX ADMIN — HEPARIN SODIUM 5000 UNITS: 5000 INJECTION INTRAVENOUS; SUBCUTANEOUS at 14:34

## 2020-11-20 RX ADMIN — HEPARIN SODIUM 5000 UNITS: 5000 INJECTION INTRAVENOUS; SUBCUTANEOUS at 05:22

## 2020-11-20 RX ADMIN — POTASSIUM CHLORIDE, DEXTROSE MONOHYDRATE AND SODIUM CHLORIDE 100 ML/HR: 150; 5; 450 INJECTION, SOLUTION INTRAVENOUS at 09:22

## 2020-11-20 RX ADMIN — GABAPENTIN 300 MG: 300 CAPSULE ORAL at 20:12

## 2020-11-20 RX ADMIN — HYDROMORPHONE HYDROCHLORIDE 0.2 MG: 1 INJECTION, SOLUTION INTRAMUSCULAR; INTRAVENOUS; SUBCUTANEOUS at 18:04

## 2020-11-20 NOTE — PROGRESS NOTES
"Patient Name:  Damion Llanos  YOB: 1955  [id number]    Surgery Progress Note    Date of visit: 11/20/2020    Subjective   Subjective: Feels OK. Pain controlled. Tolerating sips.         Objective     Objective:     /73 (BP Location: Right arm, Patient Position: Lying)   Pulse 57   Temp 98.3 °F (36.8 °C) (Oral)   Resp 18   Ht 172.7 cm (68\")   Wt 101 kg (223 lb)   SpO2 93%   BMI 33.91 kg/m²     Intake/Output Summary (Last 24 hours) at 11/20/2020 0821  Last data filed at 11/20/2020 0600  Gross per 24 hour   Intake 3860 ml   Output 2875 ml   Net 985 ml       CV:  Rhythm  regular and rate regular   L:  Clear  to auscultation bilaterally   Abd:  Bowel sounds positive , soft, appropriately tender. Dressings c/d/i  Ext:  No cyanosis, clubbing, edema    Recent labs that are back at this time have been reviewed.        Assessment/Plan     Assessment/ Plan:    Hospital Problem List     Perforated bowel (CMS/HCC) - Doing well after ileostomy takedown. D/C NG. Advance diet as tolerated. Begin dressing changes.      Tobacco abuse    Chronic obstructive pulmonary disease with (acute) exacerbation (CMS/HCC)        Essential hypertension        Type 2 diabetes mellitus with complication, without long-term current use of insulin (CMS/HCC)        Chronic alcohol abuse        H/O ileostomy              El Nugent MD  11/20/2020  08:21 EST      "

## 2020-11-20 NOTE — PLAN OF CARE
Problem: Adult Inpatient Plan of Care  Goal: Absence of Hospital-Acquired Illness or Injury  Intervention: Identify and Manage Fall Risk  Recent Flowsheet Documentation  Taken 11/20/2020 0200 by Edwardo Celestin RN  Safety Promotion/Fall Prevention:   activity supervised   assistive device/personal items within reach   clutter free environment maintained   elopement precautions   fall prevention program maintained   nonskid shoes/slippers when out of bed   safety round/check completed  Taken 11/20/2020 0000 by Edwardo Celestin RN  Safety Promotion/Fall Prevention:   activity supervised   assistive device/personal items within reach   clutter free environment maintained   elopement precautions   fall prevention program maintained   nonskid shoes/slippers when out of bed   safety round/check completed  Taken 11/19/2020 2200 by Edwardo Celestin RN  Safety Promotion/Fall Prevention:   activity supervised   assistive device/personal items within reach   clutter free environment maintained   elopement precautions   fall prevention program maintained   nonskid shoes/slippers when out of bed   safety round/check completed  Taken 11/19/2020 2000 by Edwardo Celestin RN  Safety Promotion/Fall Prevention:   activity supervised   assistive device/personal items within reach   clutter free environment maintained   elopement precautions   fall prevention program maintained   nonskid shoes/slippers when out of bed   safety round/check completed  Intervention: Prevent Skin Injury  Recent Flowsheet Documentation  Taken 11/20/2020 0200 by Edwardo Celestin RN  Body Position: position changed independently  Taken 11/20/2020 0000 by Edwardo Celestin RN  Body Position: position changed independently  Taken 11/19/2020 2200 by Edwardo Celestin RN  Body Position: position changed independently  Taken 11/19/2020 2000 by Edwardo Celestin RN  Body Position: position changed independently  Intervention: Prevent and Manage VTE (venous thromboembolism) Risk  Recent Flowsheet  Documentation  Taken 11/20/2020 0200 by Edwardo Celestin RN  VTE Prevention/Management:   bilateral   sequential compression devices on  Taken 11/20/2020 0000 by Edwardo Celestin RN  VTE Prevention/Management:   bilateral   dorsiflexion/plantar flexion performed   sequential compression devices on  Taken 11/19/2020 2200 by Edwardo Celestin RN  VTE Prevention/Management:   bilateral   sequential compression devices on  Taken 11/19/2020 2000 by Edwardo Celestin RN  VTE Prevention/Management:   bilateral   dorsiflexion/plantar flexion performed   sequential compression devices on  Intervention: Prevent Infection  Recent Flowsheet Documentation  Taken 11/19/2020 2000 by Edwardo Celestin RN  Infection Prevention:   single patient room provided   rest/sleep promoted   environmental surveillance performed   hand hygiene promoted  Goal: Optimal Comfort and Wellbeing  Intervention: Provide Person-Centered Care  Recent Flowsheet Documentation  Taken 11/19/2020 2000 by Edwardo Celestin RN  Trust Relationship/Rapport:   care explained   choices provided   empathic listening provided   emotional support provided   questions answered   questions encouraged   reassurance provided   thoughts/feelings acknowledged   Goal Outcome Evaluation:  Plan of Care Reviewed With: patient

## 2020-11-20 NOTE — PLAN OF CARE
VSS. RRR. Lung sounds clear bilaterally. 2+ edema in lower extremities. Abdomen taut and distended. Patient had intermittent abdominal pain well controlled with Norco. Up with standby assist. Patient on CIWA protocol, scored 0 throughout day. Abdominal incision from ileostomy takedown had moderate serosanguinous output. Ordered wet to dry packing  in spaces between sutures with abdominal pad covering, secured with foam tape. Nasogastric tube removed by physician prior to RNs 8 o'clock assessment. Patient had 3 bowel movements throughout the day. Diet advanced to full liquid. Patient remaining to monitor.

## 2020-11-20 NOTE — CONSULTS
"  Referring Provider: MD Raffi  Reason for Consultation: Smoking Cessation    Subjective .   Education:  NN spoke with pt at BS.  Pt alert and able to answer questions appropriately.  Pt O2 sat 100 % on RA currently, no home O2 use.  Pt states use of rescue inhaler \"hardly ever\", relief of SOB within 2-3 mins.  Patient reports that he is smoking more recently as he is not able to get out and stay busy as much with pandemic.  He also reports that he is drinking more than he should.  Smoking cessation education completed. Cessation support resources discussed with pt.  Up to date on flu and PNA vaccines.  Pt reports the ability to ambulate ~10 stair steps at baseline.  Pt reports no issues at this time with medications or transportation for appointments.  Pt reports no previous hx of formal COPD teaching, no understanding of action plan, or CT.  Stop light report, NN contact information, instructions for accessing iT[facility] and list of educational videos given to pt.  \"A Patient's Guide to COPD\" booklet left at BS.  Deep breathing exercises encouraged.  COPD action plan reviewed.  Limited COPD education completed in the form of explanation, handouts, and videos.  Education limited due to patient report of no previous diagnosis of COPD.  No new concerns or questions voiced at this time.  NN will continue to follow as needed.    Age: 65 y.o.  Sex: male  Smoker Status: current, pack years unclear  Pulmonologist: SARAH  FEV1 (PFT): NA  Home O2: RA    Objective     SpO2 SpO2: 94 % (11/20/20 0900)  Device Device (Oxygen Therapy): (P) room air (11/20/20 0800)  Flow Flow (L/min): 2 (11/20/20 0400)  Incentive Spirometer $ Incentive Spirometry: yes (11/19/20 2000)  IS Predicted Level (mL) Incentive Spirometer Predicted Level (mL): 1500 (11/19/20 2000)   Number of Repetitions Number of Repetitions (IS): 10 (11/19/20 2000)   Level Incentive Spirometer (mL) Level Incentive Spirometer (mL): 1500 (11/19/20 2000)  Patient Tolerance Patient " Tolerance (IS): good, no adverse signs/symptoms present (11/20/20 0000)   Inhaler Treatment Status    Treatment Route        Home Medications:  Medications Prior to Admission   Medication Sig Dispense Refill Last Dose   • albuterol (PROVENTIL HFA;VENTOLIN HFA) 108 (90 Base) MCG/ACT inhaler Inhale 2 puffs Every 4 (Four) Hours As Needed for Wheezing.   Past Week at Unknown time   • carvedilol (COREG) 12.5 MG tablet Take 12.5 mg by mouth 2 (Two) Times a Day With Meals.   11/19/2020 at 0520   • ferrous sulfate 325 (65 FE) MG tablet Take 325 mg by mouth Every Other Day.   11/18/2020 at 0800   • furosemide (LASIX) 40 MG tablet Take 40 mg by mouth Daily.   11/18/2020 at 2200   • gabapentin (NEURONTIN) 300 MG capsule Take 300 mg by mouth 3 (Three) Times a Day.   11/18/2020 at 2000   • HYDROcodone-acetaminophen (NORCO)  MG per tablet Take 1 tablet by mouth Every 8 (Eight) Hours As Needed for Moderate Pain .   11/19/2020 at 0520   • lisinopril (PRINIVIL,ZESTRIL) 40 MG tablet Take 20 mg by mouth Every Morning.   11/18/2020 at 0800   • metFORMIN (GLUCOPHAGE) 500 MG tablet Take 500 mg by mouth 2 (Two) Times a Day With Meals.   11/18/2020 at 0800   • pantoprazole (PROTONIX) 40 MG EC tablet Take 40 mg by mouth Daily.   11/19/2020 at 0520   • simvastatin (ZOCOR) 20 MG tablet Take 20 mg by mouth Every Night.   11/18/2020 at 2000       Discussion: Per current GOLD Standards, please consider: No LAMA/LABA/ICS in place, Outpatient PFT, Pulmonary rehab as appropriate      Discussed with primary RN    Elisa Eli RN

## 2020-11-20 NOTE — PROGRESS NOTES
Discharge Planning Assessment  [facility]     Patient Name: Damion Llanos  MRN: [id number]  Today's Date: 11/20/2020    Admit Date: 11/19/2020    Discharge Needs Assessment     Row Name 11/20/20 1417       Living Environment    Lives With  alone pt resides in [facility]    Current Living Arrangements  home/apartment/condo    Primary Care Provided by  self    Provides Primary Care For  no one    Family Caregiver if Needed  child(constanza), adult    Family Caregiver Names  son- Damion    Quality of Family Relationships  helpful;involved;supportive    Able to Return to Prior Arrangements  yes       Resource/Environmental Concerns    Resource/Environmental Concerns  none       Transition Planning    Patient/Family Anticipates Transition to  home;home with help/services    Patient/Family Anticipated Services at Transition  home health care    Transportation Anticipated  family or friend will provide       Discharge Needs Assessment    Readmission Within the Last 30 Days  no previous admission in last 30 days    Equipment Currently Used at Home  commode;shower chair;walker, rolling;cane, straight pt has DME from his wife however he denies use    Concerns to be Addressed  discharge planning    Anticipated Changes Related to Illness  none    Equipment Needed After Discharge  none    Outpatient/Agency/Support Group Needs  homecare agency    Discharge Facility/Level of Care Needs  home with home health    Provided Post Acute Provider List?  Yes    Post Acute Provider List  Home Health    Provided Post Acute Provider Quality & Resource List?  Yes    Post Acute Provider Quality and Resource List  Home Health    Delivered To  Patient    Method of Delivery  In person    Patient's Choice of Community Agency(s)  [facility]    Current Discharge Risk  lives alone        Discharge Plan     Row Name 11/20/20 1426       Plan    Plan  home with home health    Patient/Family in Agreement with Plan  yes    Plan Comments  CM  spoke with pt at bedside. Pt resides alone in [facility] and pt's son lives next door and can assist. Pt reports he is independent of adls and denies use of DME. Pt reports he has cane, walker, bedside commode and shower chair from his wife he just does not currently use them. Pt denies current home health or outpatient medical services. CM discussed discharge needs as pt has a wound that will need to be dressed teice daily. Pt reports he feels he will be able to do this on his own and his son can also assist him. CM discussed home health with pt and he is also agreeable to have home health services. CM reviewed home health providers and pt would like to use [facility] services. Pt understands home health nurse will not be available daily for wound care. CM contacted Abdi with [facility] and referral given, CM will need to notify when pt is discharged. CM disccused in morning MDR's and primary RN aware to educate pt on dressing change. Pt denies additional needs at this time. CM will continue to follow.    Final Discharge Disposition Code  06 - home with home health care        Continued Care and Services - Admitted Since 11/19/2020     Home Medical Care Coordination complete    Service Provider Request Status Selected Services Address Phone Fax Patient Preferred    [facility]   Selected Home Health Services [address] [facility] [address] [phone] --                Demographic Summary     Row Name 11/20/20 1415       General Information    Referral Source  admission list    Reason for Consult  discharge planning    Preferred Language  English    General Information Comments  PCP- Konstantin Palm       Contact Information    Permission Granted to Share Info With          Functional Status     Row Name 11/20/20 1416       Functional Status    Usual Activity Tolerance  good    Current Activity Tolerance  moderate       Functional  Status, IADL    Medications  independent    Meal Preparation  independent    Housekeeping  independent    Laundry  independent    Shopping  independent       Employment/    Employment/ Comments  pt confirms he has Medicare and [employer], denies concerns or disruption in coverage. Pt has prescription drug coverage and denies issues obtaining or affording current medications.        Psychosocial    No documentation.       Abuse/Neglect    No documentation.       Legal    No documentation.       Substance Abuse    No documentation.       Patient Forms    No documentation.           Latesha Johnson RN

## 2020-11-21 VITALS
SYSTOLIC BLOOD PRESSURE: 134 MMHG | TEMPERATURE: 99 F | HEART RATE: 76 BPM | OXYGEN SATURATION: 94 % | RESPIRATION RATE: 16 BRPM | HEIGHT: 68 IN | WEIGHT: 223 LBS | BODY MASS INDEX: 33.8 KG/M2 | DIASTOLIC BLOOD PRESSURE: 69 MMHG

## 2020-11-21 LAB
ANION GAP SERPL CALCULATED.3IONS-SCNC: 10 MMOL/L (ref 5–15)
BUN SERPL-MCNC: 5 MG/DL (ref 8–23)
BUN/CREAT SERPL: 8.3 (ref 7–25)
CALCIUM SPEC-SCNC: 8.7 MG/DL (ref 8.6–10.5)
CHLORIDE SERPL-SCNC: 106 MMOL/L (ref 98–107)
CO2 SERPL-SCNC: 22 MMOL/L (ref 22–29)
CREAT SERPL-MCNC: 0.6 MG/DL (ref 0.76–1.27)
DEPRECATED RDW RBC AUTO: 48.1 FL (ref 37–54)
ERYTHROCYTE [DISTWIDTH] IN BLOOD BY AUTOMATED COUNT: 12.6 % (ref 12.3–15.4)
GFR SERPL CREATININE-BSD FRML MDRD: 134 ML/MIN/1.73
GLUCOSE BLDC GLUCOMTR-MCNC: 123 MG/DL (ref 70–130)
GLUCOSE BLDC GLUCOMTR-MCNC: 125 MG/DL (ref 70–130)
GLUCOSE SERPL-MCNC: 120 MG/DL (ref 65–99)
HCT VFR BLD AUTO: 35.4 % (ref 37.5–51)
HGB BLD-MCNC: 10.9 G/DL (ref 13–17.7)
MCH RBC QN AUTO: 32.2 PG (ref 26.6–33)
MCHC RBC AUTO-ENTMCNC: 30.8 G/DL (ref 31.5–35.7)
MCV RBC AUTO: 104.7 FL (ref 79–97)
PLATELET # BLD AUTO: 193 10*3/MM3 (ref 140–450)
PMV BLD AUTO: 11.4 FL (ref 6–12)
POTASSIUM SERPL-SCNC: 3.5 MMOL/L (ref 3.5–5.2)
RBC # BLD AUTO: 3.38 10*6/MM3 (ref 4.14–5.8)
SODIUM SERPL-SCNC: 138 MMOL/L (ref 136–145)
WBC # BLD AUTO: 7.13 10*3/MM3 (ref 3.4–10.8)

## 2020-11-21 PROCEDURE — 25010000002 HYDROMORPHONE PER 4 MG: Performed by: SURGERY

## 2020-11-21 PROCEDURE — 82962 GLUCOSE BLOOD TEST: CPT

## 2020-11-21 PROCEDURE — 85027 COMPLETE CBC AUTOMATED: CPT | Performed by: SURGERY

## 2020-11-21 PROCEDURE — 94799 UNLISTED PULMONARY SVC/PX: CPT

## 2020-11-21 PROCEDURE — 80048 BASIC METABOLIC PNL TOTAL CA: CPT | Performed by: SURGERY

## 2020-11-21 PROCEDURE — 25010000002 HEPARIN (PORCINE) PER 1000 UNITS: Performed by: SURGERY

## 2020-11-21 RX ORDER — HYDROCODONE BITARTRATE AND ACETAMINOPHEN 10; 325 MG/1; MG/1
1 TABLET ORAL EVERY 4 HOURS PRN
Qty: 12 TABLET | Refills: 0 | Status: SHIPPED | OUTPATIENT
Start: 2020-11-21

## 2020-11-21 RX ORDER — PSEUDOEPHEDRINE HCL 30 MG
100 TABLET ORAL 2 TIMES DAILY
Qty: 20 CAPSULE | Refills: 0 | Status: SHIPPED | OUTPATIENT
Start: 2020-11-21

## 2020-11-21 RX ADMIN — CARVEDILOL 12.5 MG: 12.5 TABLET, FILM COATED ORAL at 08:10

## 2020-11-21 RX ADMIN — Medication 1 PATCH: at 08:08

## 2020-11-21 RX ADMIN — POTASSIUM CHLORIDE, DEXTROSE MONOHYDRATE AND SODIUM CHLORIDE 100 ML/HR: 150; 5; 450 INJECTION, SOLUTION INTRAVENOUS at 06:18

## 2020-11-21 RX ADMIN — HEPARIN SODIUM 5000 UNITS: 5000 INJECTION INTRAVENOUS; SUBCUTANEOUS at 06:13

## 2020-11-21 RX ADMIN — ATORVASTATIN CALCIUM 10 MG: 10 TABLET, FILM COATED ORAL at 08:08

## 2020-11-21 RX ADMIN — METFORMIN HYDROCHLORIDE 500 MG: 500 TABLET ORAL at 08:08

## 2020-11-21 RX ADMIN — GABAPENTIN 300 MG: 300 CAPSULE ORAL at 08:08

## 2020-11-21 RX ADMIN — HYDROMORPHONE HYDROCHLORIDE 0.2 MG: 1 INJECTION, SOLUTION INTRAMUSCULAR; INTRAVENOUS; SUBCUTANEOUS at 06:18

## 2020-11-21 RX ADMIN — PANTOPRAZOLE SODIUM 40 MG: 40 TABLET, DELAYED RELEASE ORAL at 06:13

## 2020-11-21 RX ADMIN — LISINOPRIL 20 MG: 20 TABLET ORAL at 08:07

## 2020-11-21 RX ADMIN — FUROSEMIDE 40 MG: 40 TABLET ORAL at 08:08

## 2020-11-21 NOTE — PROGRESS NOTES
Case Management Discharge Note      Final Note: Patient being discharged home today.  Spoke with Stephanie with [facility] and informed of patient discharge.    Provided Post Acute Provider List?: Yes  Post Acute Provider List: Home Health  Provided Post Acute Provider Quality & Resource List?: Yes  Post Acute Provider Quality and Resource List: Home Health  Delivered To: Patient  Method of Delivery: In person    Selected Continued Care - Admitted Since 11/19/2020     Destination    No services have been selected for the patient.              Durable Medical Equipment    No services have been selected for the patient.              Dialysis/Infusion    No services have been selected for the patient.              Home Medical Care Coordination complete    Service Provider Selected Services Address Phone Fax Patient Preferred    [facility]  Home Health Services [address] [facility] [address] [phone] --          Therapy    No services have been selected for the patient.              Community Resources    No services have been selected for the patient.                       Final Discharge Disposition Code: 06 - home with home health care

## 2020-11-21 NOTE — PROGRESS NOTES
"Patient Name:  Damion Llanos  YOB: 1955  [id number]    Surgery Progress Note    Date of visit: 11/21/2020    Subjective   Subjective: Feeling well.  Tolerating regular diet, having bowel movements.  Tolerating dressing changes as well.  He wants to go home.         Objective     Objective:     /72 (BP Location: Right arm, Patient Position: Lying)   Pulse 72   Temp 98.6 °F (37 °C) (Oral)   Resp 16   Ht 172.7 cm (68\")   Wt 101 kg (223 lb)   SpO2 94%   BMI 33.91 kg/m²     Intake/Output Summary (Last 24 hours) at 11/21/2020 0923  Last data filed at 11/21/2020 0319  Gross per 24 hour   Intake --   Output 300 ml   Net -300 ml       CV:  Rhythm  regular and rate regular   L:  Clear  to auscultation bilaterally   Abd:  Bowel sounds positive , soft, minimally tender. Wound c/d/i  Ext:  No cyanosis, clubbing, edema    Recent labs that are back at this time have been reviewed.        Assessment/Plan     Assessment/ Plan:    Hospital Problem List     Perforated bowel (CMS/HCC) - Doing well after ileostomy takedown.  D/C home. RTC with me in 2 weeks. No lifting > 30 lbs until RTC. May remove dressings in 24 hours, may shower at that time.  Home health has been arranged for home dressing changes.      Tobacco abuse    Chronic obstructive pulmonary disease with (acute) exacerbation (CMS/HCC)        Essential hypertension        Type 2 diabetes mellitus with complication, without long-term current use of insulin (CMS/HCC)        Chronic alcohol abuse        H/O ileostomy              El Nugent MD  11/21/2020  09:23 EST      "

## 2020-11-21 NOTE — DISCHARGE SUMMARY
Discharge Summary    Patient Name:  Damion Llanos  YOB: 1955  [id number]      DATE OF ADMISSION: 11/19/2020    DATE OF DISCHARGE: 11/21/2020       FINAL DIAGNOSES:   Patient Active Problem List   Diagnosis   • Shortness of breath   • Anemia   • Moderate obesity   • Tobacco abuse   • Chronic obstructive pulmonary disease with (acute) exacerbation (CMS/HCC)   • Essential hypertension   • Primary osteoarthritis of both hips   • Type 2 diabetes mellitus with complication, without long-term current use of insulin (CMS/HCC)   • severe iron deficiency anemia   • Dependent edema   • Chronic blood loss anemia   • Perforated bowel (CMS/HCC)   • Chronic pain   • Chronic alcohol abuse   • Hypoxia   • H/O ileostomy       CONSULTING SERVICES: None      PROCEDURES PERFORMED:   1.  Ileostomy takedown and repair of parastomal hernia with mesh performed on 11/19/2020    HISTORY: The patient is a very pleasant 65 y.o. male with a history of a previously perforated bowel, status post ileostomy and mucous fistula.  He is now ready for ileostomy takedown.  The risks and benefits were discussed at length with the patient, who agreed to proceed.      HOSPITAL COURSE: The patient came in as an outpatient, underwent his operative procedure, was transferred to the floor.  Postoperatively he did well.  On postoperative day 1 he had return of bowel function.  His diet was advanced, and home health was arranged for home dressing changes.  He began dressing changes in the hospital and was tolerating this without difficulty.  His wound remained clear, his bowel function resumed, and he was ready for discharge home on 11/21/2020.     CONDITION: At the time of discharge, the patient is tolerating a regular diet without difficulty. he is having normal bowel and bladder function. his incisions are clean, dry and intact.  His pain is well controlled on oral pain medication     DISCHARGE MEDICATIONS:   1. All previous home  medications.   2.  Hydrocodone 10 mg PO  Q4h  PRN pain  3. Colace 100mg PO BID       DISCHARGE INSTRUCTIONS:  1. The patient is instructed to follow up with Dr. Nugent in 2 weeks’ time.  2. he is instructed to refrain from lifting more than 15 or 20 pounds until their return to clinic.   3. If the patient has worsening problems with fever or chills nausea or vomiting he is to contact Dr. Nugent's office and a contact card has been provided.  4.  He is to continue with wet-to-dry dressing changes twice daily to his old ileostomy site, and has home health arranged to assist with this.      El Nugent MD  11/21/2020  09:25 EST

## 2020-11-21 NOTE — PLAN OF CARE
Goal Outcome Evaluation:  Plan of Care Reviewed With: patient  Progress: improving  Outcome Summary: Patient being discharged home. Dressing education done. To f/ u with Dr. Nugent in 2 weeks and pcp 1 week

## 2020-11-21 NOTE — PLAN OF CARE
Goal Outcome Evaluation:  Plan of Care Reviewed With: patient  Progress: no change  Outcome Summary: Pt dressing changed. Wet to dry gauze and covered with mepliex. Premedicated with dilaudid before dressing change. No complaints at this time. VSS. Will continue to monitor. 0650 11/201/2020

## 2020-11-22 ENCOUNTER — READMISSION MANAGEMENT (OUTPATIENT)
Dept: CALL CENTER | Facility: HOSPITAL | Age: 65
End: 2020-11-22

## 2020-11-22 NOTE — OUTREACH NOTE
Prep Survey      Responses   [facility] facility patient discharged from?  [facility]   Is LACE score < 7 ?  No   Eligibility  Readm Mgmt   Discharge diagnosis  Ileostomy takedown and repair of parastomal hernia with mesh    Does the patient have one of the following disease processes/diagnoses(primary or secondary)?  General Surgery   Does the patient have Home health ordered?  Yes   What is the Home health agency?   [facility]   Is there a DME ordered?  No   Prep survey completed?  Yes          Latehsa Milian RN

## 2020-11-24 ENCOUNTER — READMISSION MANAGEMENT (OUTPATIENT)
Dept: CALL CENTER | Facility: HOSPITAL | Age: 65
End: 2020-11-24

## 2020-11-24 NOTE — OUTREACH NOTE
General Surgery Week 1 Survey      Responses   [facility] patient discharged from?  [place]   Does the patient have one of the following disease processes/diagnoses(primary or secondary)?  General Surgery   Week 1 attempt successful?  No   Unsuccessful attempts  Attempt 1          Camilla Munroe RN

## 2020-11-24 NOTE — OUTREACH NOTE
General Surgery Week 1 Survey      Responses   [facility] patient discharged from?  [place]   Does the patient have one of the following disease processes/diagnoses(primary or secondary)?  General Surgery   Week 1 attempt successful?  No   Unsuccessful attempts  Attempt 1          Camilla Munroe RN

## 2020-11-25 ENCOUNTER — READMISSION MANAGEMENT (OUTPATIENT)
Dept: CALL CENTER | Facility: HOSPITAL | Age: 65
End: 2020-11-25

## 2020-11-25 NOTE — OUTREACH NOTE
General Surgery Week 1 Survey      Responses   [facility] patient discharged from?  [place]   Does the patient have one of the following disease processes/diagnoses(primary or secondary)?  General Surgery   Week 1 attempt successful?  Yes   Call start time  1548   Call end time  1554   Discharge diagnosis  Ileostomy takedown and repair of parastomal hernia with mesh    Is patient permission given to speak with other caregiver?  No   List who call center can speak with  Patient only   Meds reviewed with patient/caregiver?  Yes   Is the patient having any side effects they believe may be caused by any medication additions or changes?  No   Does the patient have all medications related to this admission filled (includes all antibiotics, pain medications, etc.)  Yes   Is the patient taking all medications as directed (includes completed medication regime)?  Yes   Does the patient have a follow up appointment scheduled with their surgeon?  Yes   Has the patient kept scheduled appointments due by today?  N/A   Comments  Dec 6 with surgeon   What is the Home health agency?   [facility]   Has home health visited the patient within 72 hours of discharge?  Yes   DME comments  Packing his incision BID   Psychosocial issues?  No   Did the patient receive a copy of their discharge instructions?  Yes   Nursing interventions  Reviewed instructions with patient   What is the patient's perception of their health status since discharge?  Improving   Nursing interventions  Nurse provided patient education   Is the patient /caregiver able to teach back basic post-op care?  Practice 'cough and deep breath', Keep incision areas clean,dry and protected, No tub bath, swimming, or hot tub until instructed by MD, Lifting as instructed by MD in discharge instructions   Is the patient/caregiver able to teach back signs and symptoms of incisional infection?  Increased redness, swelling or pain at the incisonal site, Increased drainage or  bleeding, Incisional warmth, Pus or odor from incision, Fever   Is the patient/caregiver able to teach back steps to recovery at home?  Eat a well-balance diet   Is the patient/caregiver able to teach back the hierarchy of who to call/visit for symptoms/problems? PCP, Specialist, Home health nurse, Urgent Care, ED, 911  Yes   Week 1 call completed?  Yes          Padmaja Cano RN

## 2020-12-03 ENCOUNTER — READMISSION MANAGEMENT (OUTPATIENT)
Dept: CALL CENTER | Facility: HOSPITAL | Age: 65
End: 2020-12-03

## 2020-12-03 NOTE — OUTREACH NOTE
General Surgery Week 2 Survey      Responses   [facility] patient discharged from?  [place]   Does the patient have one of the following disease processes/diagnoses(primary or secondary)?  General Surgery   Week 2 attempt successful?  Yes   Call start time  1401   Call end time  1406   Discharge diagnosis  Ileostomy takedown and repair of parastomal hernia with mesh    Meds reviewed with patient/caregiver?  Yes   Is the patient having any side effects they believe may be caused by any medication additions or changes?  No   Does the patient have all medications related to this admission filled (includes all antibiotics, pain medications, etc.)  Yes   Is the patient taking all medications as directed (includes completed medication regime)?  Yes   Does the patient have a follow up appointment scheduled with their surgeon?  Yes   Has the patient kept scheduled appointments due by today?  N/A   Comments  Dec 6 with surgeon   What is the Home health agency?   [facility]   Has home health visited the patient within 72 hours of discharge?  Yes   DME comments  Packing his incision BID   Psychosocial issues?  No   Did the patient receive a copy of their discharge instructions?  Yes   Nursing interventions  Reviewed instructions with patient   What is the patient's perception of their health status since discharge?  Improving   Nursing interventions  Nurse provided patient education   Is the patient /caregiver able to teach back basic post-op care?  Practice 'cough and deep breath', Keep incision areas clean,dry and protected, No tub bath, swimming, or hot tub until instructed by MD, Lifting as instructed by MD in discharge instructions   Is the patient/caregiver able to teach back signs and symptoms of incisional infection?  Increased redness, swelling or pain at the incisonal site, Increased drainage or bleeding, Incisional warmth, Pus or odor from incision, Fever   Is the patient/caregiver able to teach back steps to  recovery at home?  Eat a well-balance diet, Set small, achievable goals for return to baseline health, Rest and rebuild strength, gradually increase activity   If the patient is a current smoker, are they able to teach back resources for cessation?  Smoking cessation medications   Is the patient/caregiver able to teach back the hierarchy of who to call/visit for symptoms/problems? PCP, Specialist, Home health nurse, Urgent Care, ED, 911  Yes   Week 2 call completed?  Yes          Galo Aviles RN

## 2020-12-11 ENCOUNTER — READMISSION MANAGEMENT (OUTPATIENT)
Dept: CALL CENTER | Facility: HOSPITAL | Age: 65
End: 2020-12-11

## 2020-12-11 NOTE — OUTREACH NOTE
General Surgery Week 3 Survey      Responses   [facility] patient discharged from?  [place]   Does the patient have one of the following disease processes/diagnoses(primary or secondary)?  General Surgery   Week 3 attempt successful?  Yes   Call start time  1745   Call end time  1746   Discharge diagnosis  Ileostomy takedown and repair of parastomal hernia with mesh    Is the patient taking all medications as directed (includes completed medication regime)?  Yes   Has the patient kept scheduled appointments due by today?  Yes   Psychosocial issues?  No   What is the patient's perception of their health status since discharge?  Improving   Is the patient/caregiver able to teach back the hierarchy of who to call/visit for symptoms/problems? PCP, Specialist, Home health nurse, Urgent Care, ED, 911  Yes   Week 3 call completed?  Yes   Revoked  No further contact(revokes)-requires comment   Is the patient interested in additional calls from an ambulatory ?  NOTE:  applies to high risk patients requiring additional follow-up.  No   Graduated/Revoked comments  States he is doing well, been back to see his surgeon and had his stitches removed, no further calls needed.          Kiera Gutierrez RN

## 2021-06-30 NOTE — PROGRESS NOTES
Discharge Planning Assessment  [facility]     Patient Name: Damion Llanos  MRN: [id number]  Today's Date: 4/21/2020    Admit Date: 4/21/2020    Discharge Needs Assessment     Row Name 04/21/20 1414       Living Environment    Lives With  alone    Current Living Arrangements  home/apartment/condo    Primary Care Provided by  self    Provides Primary Care For  no one    Family Caregiver if Needed  child(constanza), adult    Family Caregiver Names  Damion - son    Quality of Family Relationships  helpful;involved;supportive    Able to Return to Prior Arrangements  yes       Resource/Environmental Concerns    Resource/Environmental Concerns  none    Transportation Concerns  car, none       Transition Planning    Patient/Family Anticipates Transition to  home with family    Patient/Family Anticipated Services at Transition  none    Transportation Anticipated  car, drives self       Discharge Needs Assessment    Readmission Within the Last 30 Days  no previous admission in last 30 days    Concerns to be Addressed  denies needs/concerns at this time    Equipment Currently Used at Home  none        Discharge Plan     Row Name 04/21/20 1415       Plan    Plan  Home    Plan Comments  CM spoke with patient at bedside. Patient resides in [facility] [place], alone. Patient states that his wife passed away last year. Patient states that his son, Damion lives close by and assists him as needed. Patient states he is independent with ADL's. Patient states that he still drives. Patient denies any DME, current HH or outpatient treatment. Patient denies any discharge planning needs at this time. CM will continue to follow.     Final Discharge Disposition Code  30 - still a patient                  Demographic Summary     Row Name 04/21/20 1412       General Information    Arrived From  home    Referral Source  emergency department    Reason for Consult  discharge planning    Preferred Language  English     Used During This Interaction   no    General Information Comments  PCP: Konstantin Palm       Contact Information    Contact Information Comments  Son - Damion Llanos        Functional Status     Row Name 04/21/20 1413       Functional Status    Usual Activity Tolerance  good    Current Activity Tolerance  moderate       Functional Status, IADL    Medications  independent    Meal Preparation  independent    Housekeeping  independent    Laundry  independent    Shopping  independent       Mental Status    General Appearance WDL  WDL       Mental Status Summary    Recent Changes in Mental Status/Cognitive Functioning  no changes       Employment/    Employment/ Comments  Patient states he is able to afford/obtain his medications without difficulty            Shanel Escobar     show

## 2024-05-17 NOTE — OUTREACH NOTE
General Surgery Week 4 Survey      Responses   [facility] patient discharged from?  [place]   Does the patient have one of the following disease processes/diagnoses(primary or secondary)?  General Surgery   Week 4 attempt successful?  Yes   Call start time  1615   Call end time  1615   General alerts for this patient  New ileostomy   Discharge diagnosis  Perforated bowel,  New ileostomy   Is the patient taking all medications as directed (includes completed medication regime)?  Yes   Has the patient kept scheduled appointments due by today?  Yes   Is the patient still receiving Home Health Services?  N/A   Psychosocial issues?  No   What is the patient's perception of their health status since discharge?  Improving   Nursing interventions  Nurse provided patient education   Is the patient/caregiver able to teach back the hierarchy of who to call/visit for symptoms/problems? PCP, Specialist, Home health nurse, Urgent Care, ED, 911  Yes   Week 4 call completed?  Yes   Would the patient like one additional call?  No   Graduated  Yes   Did the patient feel the follow up calls were helpful during their recovery period?  Yes   Was the number of calls appropriate?  Yes          Peri Milian RN   Negative

## 2024-12-31 ENCOUNTER — HOSPITAL ENCOUNTER (EMERGENCY)
Facility: HOSPITAL | Age: 69
Discharge: HOME | End: 2025-01-01
Attending: EMERGENCY MEDICINE
Payer: MEDICARE

## 2024-12-31 DIAGNOSIS — Z86.39 HISTORY OF DIABETES MELLITUS: ICD-10-CM

## 2024-12-31 DIAGNOSIS — Z86.39 HISTORY OF HYPERLIPIDEMIA: ICD-10-CM
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
## 2024-12-31 DIAGNOSIS — F10.10 CHRONIC ALCOHOL ABUSE: ICD-10-CM

## 2024-12-31 DIAGNOSIS — E87.20 METABOLIC ACIDOSIS: ICD-10-CM

## 2024-12-31 DIAGNOSIS — E11.65 HYPERGLYCEMIA DUE TO DIABETES MELLITUS: ICD-10-CM

## 2024-12-31 DIAGNOSIS — S40.812A ABRASION OF LEFT UPPER EXTREMITY, INITIAL ENCOUNTER: ICD-10-CM

## 2024-12-31 DIAGNOSIS — F10.929 ALCOHOLIC INTOXICATION WITH COMPLICATION: Primary | ICD-10-CM

## 2024-12-31 DIAGNOSIS — R53.1 GENERALIZED WEAKNESS: ICD-10-CM

## 2024-12-31 DIAGNOSIS — Z86.79 HISTORY OF HYPERTENSION: ICD-10-CM

## 2024-12-31 PROCEDURE — 36415 COLL VENOUS BLD VENIPUNCTURE: CPT

## 2024-12-31 PROCEDURE — 83605 ASSAY OF LACTIC ACID: CPT | Performed by: PHYSICIAN ASSISTANT

## 2024-12-31 PROCEDURE — 99283 EMERGENCY DEPT VISIT LOW MDM: CPT

## 2024-12-31 PROCEDURE — 83690 ASSAY OF LIPASE: CPT | Performed by: PHYSICIAN ASSISTANT

## 2024-12-31 PROCEDURE — 85025 COMPLETE CBC W/AUTO DIFF WBC: CPT | Performed by: PHYSICIAN ASSISTANT

## 2024-12-31 PROCEDURE — 82077 ASSAY SPEC XCP UR&BREATH IA: CPT | Performed by: PHYSICIAN ASSISTANT

## 2024-12-31 PROCEDURE — 80053 COMPREHEN METABOLIC PANEL: CPT | Performed by: PHYSICIAN ASSISTANT

## 2025-01-01 VITALS
BODY MASS INDEX: 33.34 KG/M2 | WEIGHT: 220 LBS | SYSTOLIC BLOOD PRESSURE: 133 MMHG | HEART RATE: 85 BPM | TEMPERATURE: 97.8 F | HEIGHT: 68 IN | DIASTOLIC BLOOD PRESSURE: 76 MMHG | RESPIRATION RATE: 18 BRPM | OXYGEN SATURATION: 99 %

## 2025-01-01 LAB
ALBUMIN SERPL-MCNC: 3.3 G/DL (ref 3.5–5.2)
ALBUMIN/GLOB SERPL: 1.4 G/DL
ALP SERPL-CCNC: 83 U/L (ref 39–117)
ALT SERPL W P-5'-P-CCNC: 13 U/L (ref 1–41)
AMPHET+METHAMPHET UR QL: NEGATIVE
AMPHETAMINES UR QL: NEGATIVE
ANION GAP SERPL CALCULATED.3IONS-SCNC: 14 MMOL/L (ref 5–15)
AST SERPL-CCNC: 20 U/L (ref 1–40)
BARBITURATES UR QL SCN: NEGATIVE
BASOPHILS # BLD AUTO: 0.01 10*3/MM3 (ref 0–0.2)
BASOPHILS NFR BLD AUTO: 0.2 % (ref 0–1.5)
BENZODIAZ UR QL SCN: NEGATIVE
BILIRUB SERPL-MCNC: 0.3 MG/DL (ref 0–1.2)
BILIRUB UR QL STRIP: NEGATIVE
BUN SERPL-MCNC: 7 MG/DL (ref 8–23)
BUN/CREAT SERPL: 10.4 (ref 7–25)
BUPRENORPHINE SERPL-MCNC: NEGATIVE NG/ML
CALCIUM SPEC-SCNC: 8.7 MG/DL (ref 8.6–10.5)
CANNABINOIDS SERPL QL: NEGATIVE
CHLORIDE SERPL-SCNC: 99 MMOL/L (ref 98–107)
CLARITY UR: CLEAR
CO2 SERPL-SCNC: 21 MMOL/L (ref 22–29)
COCAINE UR QL: NEGATIVE
COLOR UR: YELLOW
CREAT SERPL-MCNC: 0.67 MG/DL (ref 0.76–1.27)
D-LACTATE SERPL-SCNC: 2.7 MMOL/L (ref 0.5–2)
D-LACTATE SERPL-SCNC: 4.5 MMOL/L (ref 0.5–2)
DEPRECATED RDW RBC AUTO: 53.8 FL (ref 37–54)
EGFRCR SERPLBLD CKD-EPI 2021: 99.8 ML/MIN/1.73
EOSINOPHIL # BLD AUTO: 0 10*3/MM3 (ref 0–0.4)
EOSINOPHIL NFR BLD AUTO: 0 % (ref 0.3–6.2)
ERYTHROCYTE [DISTWIDTH] IN BLOOD BY AUTOMATED COUNT: 14.4 % (ref 12.3–15.4)
ETHANOL BLD-MCNC: 188 MG/DL (ref 0–10)
FENTANYL UR-MCNC: NEGATIVE NG/ML
GLOBULIN UR ELPH-MCNC: 2.3 GM/DL
GLUCOSE SERPL-MCNC: 232 MG/DL (ref 65–99)
GLUCOSE UR STRIP-MCNC: ABNORMAL MG/DL
HCT VFR BLD AUTO: 35.1 % (ref 37.5–51)
HGB BLD-MCNC: 11.5 G/DL (ref 13–17.7)
HGB UR QL STRIP.AUTO: NEGATIVE
IMM GRANULOCYTES # BLD AUTO: 0.01 10*3/MM3 (ref 0–0.05)
IMM GRANULOCYTES NFR BLD AUTO: 0.2 % (ref 0–0.5)
KETONES UR QL STRIP: NEGATIVE
LEUKOCYTE ESTERASE UR QL STRIP.AUTO: NEGATIVE
LIPASE SERPL-CCNC: 25 U/L (ref 13–60)
LYMPHOCYTES # BLD AUTO: 0.57 10*3/MM3 (ref 0.7–3.1)
LYMPHOCYTES NFR BLD AUTO: 14 % (ref 19.6–45.3)
MCH RBC QN AUTO: 33.5 PG (ref 26.6–33)
MCHC RBC AUTO-ENTMCNC: 32.8 G/DL (ref 31.5–35.7)
MCV RBC AUTO: 102.3 FL (ref 79–97)
METHADONE UR QL SCN: NEGATIVE
MONOCYTES # BLD AUTO: 0.26 10*3/MM3 (ref 0.1–0.9)
MONOCYTES NFR BLD AUTO: 6.4 % (ref 5–12)
NEUTROPHILS NFR BLD AUTO: 3.23 10*3/MM3 (ref 1.7–7)
NEUTROPHILS NFR BLD AUTO: 79.2 % (ref 42.7–76)
NITRITE UR QL STRIP: NEGATIVE
NRBC BLD AUTO-RTO: 0 /100 WBC (ref 0–0.2)
OPIATES UR QL: POSITIVE
OXYCODONE UR QL SCN: NEGATIVE
PCP UR QL SCN: NEGATIVE
PH UR STRIP.AUTO: 5.5 [PH] (ref 5–8)
PLATELET # BLD AUTO: 181 10*3/MM3 (ref 140–450)
PMV BLD AUTO: 10 FL (ref 6–12)
POTASSIUM SERPL-SCNC: 3.4 MMOL/L (ref 3.5–5.2)
PROT SERPL-MCNC: 5.6 G/DL (ref 6–8.5)
PROT UR QL STRIP: NEGATIVE
RBC # BLD AUTO: 3.43 10*6/MM3 (ref 4.14–5.8)
SODIUM SERPL-SCNC: 134 MMOL/L (ref 136–145)
SP GR UR STRIP: 1.01 (ref 1–1.03)
TRICYCLICS UR QL SCN: NEGATIVE
UROBILINOGEN UR QL STRIP: ABNORMAL
WBC NRBC COR # BLD AUTO: 4.08 10*3/MM3 (ref 3.4–10.8)

## 2025-01-01 PROCEDURE — 81003 URINALYSIS AUTO W/O SCOPE: CPT | Performed by: PHYSICIAN ASSISTANT

## 2025-01-01 PROCEDURE — 25010000002 THIAMINE HCL 200 MG/2ML SOLUTION: Performed by: PHYSICIAN ASSISTANT

## 2025-01-01 PROCEDURE — 25810000003 SODIUM CHLORIDE 0.9 % SOLUTION: Performed by: PHYSICIAN ASSISTANT

## 2025-01-01 PROCEDURE — 83605 ASSAY OF LACTIC ACID: CPT | Performed by: PHYSICIAN ASSISTANT

## 2025-01-01 PROCEDURE — 96374 THER/PROPH/DIAG INJ IV PUSH: CPT

## 2025-01-01 PROCEDURE — 80307 DRUG TEST PRSMV CHEM ANLYZR: CPT | Performed by: PHYSICIAN ASSISTANT

## 2025-01-01 RX ORDER — SODIUM CHLORIDE 0.9 % (FLUSH) 0.9 %
10 SYRINGE (ML) INJECTION AS NEEDED
Status: DISCONTINUED | OUTPATIENT
Start: 2025-01-01 | End: 2025-01-01 | Stop reason: HOSPADM

## 2025-01-01 RX ORDER — THIAMINE HYDROCHLORIDE 100 MG/ML
200 INJECTION, SOLUTION INTRAMUSCULAR; INTRAVENOUS ONCE
Status: COMPLETED | OUTPATIENT
Start: 2025-01-01 | End: 2025-01-01

## 2025-01-01 RX ADMIN — SODIUM CHLORIDE 1000 ML: 9 INJECTION, SOLUTION INTRAVENOUS at 00:52

## 2025-01-01 RX ADMIN — THIAMINE HYDROCHLORIDE 200 MG: 100 INJECTION, SOLUTION INTRAMUSCULAR; INTRAVENOUS at 00:52

## 2025-01-01 NOTE — ED PROVIDER NOTES
EMERGENCY DEPARTMENT ENCOUNTER    Pt Name: Damion Llanos  MRN: [id number]  Pt :   1955  Room Number:  HAL1/HA  Date of encounter:  2024  PCP: Konstantin Palm MD  ED Provider: DINAH Valencia    Historian: Patient    HPI:  Chief Complaint: Fall, Weakness    Context: Damion Llanos is a 69 y.o. male who presents to the ED c/o weakness and a fall.  Patient reports that he was getting up to go to the bathroom this evening and on the way to the restroom he felt as if his left leg gave out from underneath him.  Patient experienced a fall and sustained abrasions to his right forearm and elbow.  He states that following the fall he called his son who was not able to fully assist in getting him upright and thus called EMS.  Patient denies any specific complaints on interview and exam.  Per reports from EMS and family patient has been drinking tonight.  HPI     REVIEW OF SYSTEMS  A chief complaint appropriate review of systems was completed and is negative except as noted in the HPI.     PAST MEDICAL HISTORY  Past Medical History:   Diagnosis Date    Anemia     Arthritis     Diabetes mellitus     GERD (gastroesophageal reflux disease)     Gout     Hyperlipidemia     Hypertension     Wears glasses     reading        PAST SURGICAL HISTORY  Past Surgical History:   Procedure Laterality Date    COLONOSCOPY  2020    ENDOSCOPY N/A 2017    Procedure: ESOPHAGOGASTRODUODENOSCOPY;  Surgeon: Mark I Brunner, MD;  Location: [facility] ENDOSCOPY;  Service:     EXPLORATORY LAPAROTOMY N/A 2020    Procedure: LAPAROTOMY EXPLORATORY, RIGHT HEMICOLECTOMY, ILEOSTOMY, MUCUS FISTULA;  Surgeon: El Nugent MD;  Location:  [facility];  Service: General;  Laterality: N/A;    HERNIA REPAIR      ILEOSTOMY CLOSURE N/A 2020    Procedure: ILEOSTOMY TAKEDOWN AND REPAIR OF PARASTOMAL HERNIA WITH MESH;  Surgeon: El Nugent MD;  Location:  [facility];  Service: General;  Laterality: N/A;       FAMILY  HISTORY  Family History   Family history unknown: Yes       SOCIAL HISTORY  Social History     Socioeconomic History    Marital status:    Tobacco Use    Smoking status: Every Day     Current packs/day: 1.00     Average packs/day: 1 pack/day for 50.0 years (50.0 ttl pk-yrs)     Types: Cigarettes    Smokeless tobacco: Never   Substance and Sexual Activity    Alcohol use: Yes     Alcohol/week: 49.0 standard drinks of alcohol     Types: 49 Cans of beer per week     Comment: 6-7 BEERS DAILY    Drug use: No    Sexual activity: Defer       ALLERGIES  Patient has no known allergies.    PHYSICAL EXAM  Physical Exam  Vitals and nursing note reviewed.   Constitutional:       General: He is not in acute distress.     Appearance: Normal appearance. He is obese. He is not ill-appearing or toxic-appearing.   HENT:      Head: Normocephalic and atraumatic.      Nose: Nose normal.      Mouth/Throat:      Mouth: Mucous membranes are dry.   Eyes:      Extraocular Movements: Extraocular movements intact.   Cardiovascular:      Rate and Rhythm: Normal rate and regular rhythm.   Pulmonary:      Effort: Pulmonary effort is normal.      Breath sounds: Normal breath sounds.   Abdominal:      General: There is no distension.      Palpations: Abdomen is soft.      Tenderness: There is no abdominal tenderness.   Musculoskeletal:         General: Normal range of motion.      Cervical back: Normal range of motion.   Skin:     General: Skin is warm and dry.   Neurological:      General: No focal deficit present.      Mental Status: He is alert.      Cranial Nerves: No cranial nerve deficit, dysarthria or facial asymmetry.      Sensory: Sensation is intact.      Motor: Motor function is intact. No weakness, tremor, abnormal muscle tone or pronator drift.      Gait: Gait is intact.   Psychiatric:         Mood and Affect: Mood normal.         Behavior: Behavior normal.       LAB RESULTS  Results for orders placed or performed during the  hospital encounter of 12/31/24   Comprehensive Metabolic Panel    Collection Time: 12/31/24 11:50 PM    Specimen: Blood   Result Value Ref Range    Glucose 232 (H) 65 - 99 mg/dL    BUN 7 (L) 8 - 23 mg/dL    Creatinine 0.67 (L) 0.76 - 1.27 mg/dL    Sodium 134 (L) 136 - 145 mmol/L    Potassium 3.4 (L) 3.5 - 5.2 mmol/L    Chloride 99 98 - 107 mmol/L    CO2 21.0 (L) 22.0 - 29.0 mmol/L    Calcium 8.7 8.6 - 10.5 mg/dL    Total Protein 5.6 (L) 6.0 - 8.5 g/dL    Albumin 3.3 (L) 3.5 - 5.2 g/dL    ALT (SGPT) 13 1 - 41 U/L    AST (SGOT) 20 1 - 40 U/L    Alkaline Phosphatase 83 39 - 117 U/L    Total Bilirubin 0.3 0.0 - 1.2 mg/dL    Globulin 2.3 gm/dL    A/G Ratio 1.4 g/dL    BUN/Creatinine Ratio 10.4 7.0 - 25.0    Anion Gap 14.0 5.0 - 15.0 mmol/L    eGFR 99.8 >60.0 mL/min/1.73   Lipase    Collection Time: 12/31/24 11:50 PM    Specimen: Blood   Result Value Ref Range    Lipase 25 13 - 60 U/L   Lactic Acid, Plasma    Collection Time: 12/31/24 11:50 PM    Specimen: Blood   Result Value Ref Range    Lactate 4.5 (C) 0.5 - 2.0 mmol/L   Ethanol    Collection Time: 12/31/24 11:50 PM    Specimen: Blood   Result Value Ref Range    Ethanol 188 (H) 0 - 10 mg/dL   CBC Auto Differential    Collection Time: 12/31/24 11:50 PM    Specimen: Blood   Result Value Ref Range    WBC 4.08 3.40 - 10.80 10*3/mm3    RBC 3.43 (L) 4.14 - 5.80 10*6/mm3    Hemoglobin 11.5 (L) 13.0 - 17.7 g/dL    Hematocrit 35.1 (L) 37.5 - 51.0 %    .3 (H) 79.0 - 97.0 fL    MCH 33.5 (H) 26.6 - 33.0 pg    MCHC 32.8 31.5 - 35.7 g/dL    RDW 14.4 12.3 - 15.4 %    RDW-SD 53.8 37.0 - 54.0 fl    MPV 10.0 6.0 - 12.0 fL    Platelets 181 140 - 450 10*3/mm3    Neutrophil % 79.2 (H) 42.7 - 76.0 %    Lymphocyte % 14.0 (L) 19.6 - 45.3 %    Monocyte % 6.4 5.0 - 12.0 %    Eosinophil % 0.0 (L) 0.3 - 6.2 %    Basophil % 0.2 0.0 - 1.5 %    Immature Grans % 0.2 0.0 - 0.5 %    Neutrophils, Absolute 3.23 1.70 - 7.00 10*3/mm3    Lymphocytes, Absolute 0.57 (L) 0.70 - 3.10 10*3/mm3     Monocytes, Absolute 0.26 0.10 - 0.90 10*3/mm3    Eosinophils, Absolute 0.00 0.00 - 0.40 10*3/mm3    Basophils, Absolute 0.01 0.00 - 0.20 10*3/mm3    Immature Grans, Absolute 0.01 0.00 - 0.05 10*3/mm3    nRBC 0.0 0.0 - 0.2 /100 WBC   Urinalysis With Microscopic If Indicated (No Culture) - Urine, Clean Catch    Collection Time: 01/01/25 12:14 AM    Specimen: Urine, Clean Catch   Result Value Ref Range    Color, UA Yellow Yellow, Straw    Appearance, UA Clear Clear    pH, UA 5.5 5.0 - 8.0    Specific Gravity, UA 1.010 1.001 - 1.030    Glucose,  mg/dL (1+) (A) Negative    Ketones, UA Negative Negative    Bilirubin, UA Negative Negative    Blood, UA Negative Negative    Protein, UA Negative Negative    Leuk Esterase, UA Negative Negative    Nitrite, UA Negative Negative    Urobilinogen, UA 0.2 E.U./dL 0.2 - 1.0 E.U./dL   Urine Drug Screen - Urine, Clean Catch    Collection Time: 01/01/25 12:14 AM    Specimen: Urine, Clean Catch   Result Value Ref Range    THC, Screen, Urine Negative Negative    Phencyclidine (PCP), Urine Negative Negative    Cocaine Screen, Urine Negative Negative    Methamphetamine, Ur Negative Negative    Opiate Screen Positive (A) Negative    Amphetamine Screen, Urine Negative Negative    Benzodiazepine Screen, Urine Negative Negative    Tricyclic Antidepressants Screen Negative Negative    Methadone Screen, Urine Negative Negative    Barbiturates Screen, Urine Negative Negative    Oxycodone Screen, Urine Negative Negative    Buprenorphine, Screen, Urine Negative Negative   Fentanyl, Urine - Urine, Clean Catch    Collection Time: 01/01/25 12:14 AM    Specimen: Urine, Clean Catch   Result Value Ref Range    Fentanyl, Urine Negative Negative   STAT Lactic Acid, Reflex    Collection Time: 01/01/25  2:51 AM    Specimen: Blood   Result Value Ref Range    Lactate 2.7 (C) 0.5 - 2.0 mmol/L       If labs were ordered, I independently reviewed the results and considered them in treating the  patient.    RADIOLOGY  No orders to display     [] Radiologist's Report Reviewed:  I ordered and independently interpreted the above noted radiographic studies.  See radiologist's dictation for official interpretation.      PROCEDURES    Procedures    No orders to display       MEDICATIONS GIVEN IN ER    Medications   sodium chloride 0.9 % bolus 1,000 mL (0 mL Intravenous Stopped 1/1/25 0228)   thiamine (B-1) injection 200 mg (200 mg Intravenous Given 1/1/25 0052)     MEDICAL DECISION MAKING, PROGRESS, and CONSULTS   Medical Decision Making  69 year old diabetic male with history of alcohol and tobacco abuse presents to ER for evaluation with complaints of weakness after consuming multiple shots of vodka this evening. Patient without appreciable focal neurologic deficits on exam. Non-toxic appearing. Initial blood ethanol 188 with elevated lactate and blood glucose. Responded well to supportive care with IV fluids. Was able to tolerate PO and observed ambulating in ER with normal steady gate prior to discharge. No acute findings on ER workup requiring hospital admission and patient has safe disposition plan and access to follow up. Instructed on continued symptomatic care and follow up to primary care. Return precautions provided.     Problems Addressed:  Abrasion of left upper extremity, initial encounter: complicated acute illness or injury  Alcoholic intoxication with complication: complicated acute illness or injury  Chronic alcohol abuse: complicated acute illness or injury  Fall, initial encounter: complicated acute illness or injury  Generalized weakness: complicated acute illness or injury  History of diabetes mellitus: complicated acute illness or injury  History of hyperlipidemia: complicated acute illness or injury  History of hypertension: complicated acute illness or injury  Hyperglycemia due to diabetes mellitus: complicated acute illness or injury  Metabolic acidosis: complicated acute illness or  injury    Amount and/or Complexity of Data Reviewed  Labs: ordered. Decision-making details documented in ED Course.    Risk  Prescription drug management.    Discussion below represents my analysis of pertinent findings related to patient's condition, differential diagnosis, treatment plan and final disposition.    Differential diagnosis: Infection, infarction/ischemia, trauma, inflammation, ACS, arrhythmia, syncope, sepsis, viral syndrome, electrolyte abnormality, respiratory failure, anemia, dehydration, hypothyroidism, polypharmacy, substance abuse and others.      Additional sources  Discussed/ obtained information from independent historians:   [] Spouse  [] Parent  [] Family member  [] Friend  [] EMS   [] Other:  External (non-ED) record review:   [] Inpatient record:   [] Office record:   [] Outpatient record:   [] Prior Outpatient labs:   [] Prior Outpatient radiology:   [] Primary Care record:   [] Outside ED record:   [] Other:   Patient's care impacted by:   [x] Diabetes  [x] Hypertension  [] Hyperlipidemia  [] Hypothyroidism   [] Coronary Artery Disease  [] Congestive Heart Failure   [x] COPD   [] Cancer   [] Obesity  [] GERD   [x] Tobacco Abuse   [x] Substance Abuse: alcohol abuse   [] Anxiety   [] Depression   [x] Other: History of anemia,   Care significantly affected by Social Determinants of Health (housing and economic circumstances, unemployment)    [] Yes     [x] No   If yes, Patient's care significantly limited by  Social Determinants of Health including:   [] Inadequate housing   [] Low income   [] Alcoholism and drug addiction in family   [] Problems related to primary support group   [] Unemployment   [] Problems related to employment   [] Other Social Determinants of Health:   Orders placed during this visit:  Orders Placed This Encounter   Procedures    Comprehensive Metabolic Panel    Lipase    Urinalysis With Microscopic If Indicated (No Culture) - Urine, Clean Catch    Lactic Acid,  Plasma    Ethanol    Urine Drug Screen - Urine, Clean Catch    CBC Auto Differential    Fentanyl, Urine - Urine, Clean Catch    STAT Lactic Acid, Reflex    CBC & Differential     ED Course:    ED Course as of 01/01/25 1111   Tue Dec 31, 2024   2336 Patient has noted abrasion on his left elbow and forearm.  Denies any complaints of pain. [JG]   Wed Jan 01, 2025   0004 Vitals and Telemetry tracing was reviewed and directly interpreted by myself demonstrating blood pressure 133/76, afebrile with temperature 97.8 °F, heart rate of 95, respirations 18 breaths/min and oxygen saturation 96% on room air [JG]   0004 BP: 133/76 [JG]   0004 Temp: 97.8 °F (36.6 °C) [JG]   0004 Heart Rate: 95 [JG]   0004 Resp: 18 [JG]   0004 SpO2: 96 % [JG]   0037 Labs studies were reviewed and directly interpreted by myself and demonstrated elevated blood alcohol level 188.  Remainder of lab work without acute findings and consistent with prior results.  Hyperglycemia with blood glucose 232 [JG]   0038 Ethanol(!): 188 [JG]   0038 Glucose(!): 232 [JG]   0044 Initial lactate of 4.5.  I have ordered IV fluids for patient. [JG]   0047 Opiate Screen(!): Positive  UDS Positive for opiates [JG]   0345 Notified that patient is ambulating without assistance. [JG]   0345 Repeat lactate following IV fluids of 2.7. [JG]      ED Course User Index  [JG] Kurt Perez, PA            DIAGNOSIS  Final diagnoses:   Alcoholic intoxication with complication   Chronic alcohol abuse   Generalized weakness   Fall, initial encounter   Abrasion of left upper extremity, initial encounter   History of diabetes mellitus   History of hypertension   History of hyperlipidemia   Metabolic acidosis   Hyperglycemia due to diabetes mellitus     DISPOSITION    ED Disposition       ED Disposition   Discharge    Condition   Stable    Comment   --           DISCHARGE    Patient discharged in stable condition.    Reviewed implications of results, diagnosis, meds, responsibility to  follow up, warning signs and symptoms of possible worsening, potential complications and reasons to return to ER.    Patient/Family voiced understanding of above instructions.    Discussed plan for discharge, as there is no emergent indication for admission.  Pt/family is agreeable and understands need for follow up and possible repeat testing.  Pt/family is aware that discharge does not mean that nothing is wrong but that it indicates no emergency is currently present that requires admission and they must continue care with follow-up as given below or with a physician of their choice.     FOLLOW-UP  Konstantin Palm MD  [address] [place] [id number] [phone]    Call   Call for follow-up with primary care    [facility] EMERGENCY DEPARTMENT  [address] [facility] [place] [address] [phone]  Go to   If symptoms worsen         Medication List      No changes were made to your prescriptions during this visit.          Please note that portions of this document were completed with voice recognition software.        Kurt Perez PA  01/01/25 1111

## 2025-05-22 NOTE — ANESTHESIA POSTPROCEDURE EVALUATION
Caller: Estephania Fitch     Relationship: SELF    Best call back number: [phone]    What is your medical concern? PT IS HAVING IRREGULAR HEART RATES, SHE WAS ADVISED BY HER PCP TO CONTACT CARDIOLOGY TO FOLLOW UP ABOUT THIS. SHE HAS HAD SOME CONCERNS WITH HER BACK, NECK, AND SHOULDER RECENTLY. THEY THINK THERE IS A NERVE THAT MAY BE AFFECTING HER HEART RATE.     How long has this issue been going on? ABOUT A MONTH     Patient: Damion Llanos    Procedure Summary     Date: 11/19/20 Room / [facility]:  [facility]    Anesthesia Start: 0749 Anesthesia Stop:     Procedure: ILEOSTOMY TAKEDOWN AND REPAIR OF PARASTOMAL HERNIA WITH MESH (N/A ) Diagnosis:     Surgeon: El Nugent MD Provider: Lauren Tadeo MD    Anesthesia Type: general with block ASA Status: 3          Anesthesia Type: general with block    Vitals  No vitals data found for the desired time range.          Post Anesthesia Care and Evaluation    Patient location during evaluation: PACU  Patient participation: complete - patient participated  Level of consciousness: awake and alert  Pain management: adequate  Airway patency: patent  Anesthetic complications: No anesthetic complications  PONV Status: none  Cardiovascular status: hemodynamically stable and acceptable  Respiratory status: nonlabored ventilation, acceptable and nasal cannula  Hydration status: acceptable

## (undated) DEVICE — GOWN,PREVENTION PLUS,XXLARGE,STERILE: Brand: MEDLINE

## (undated) DEVICE — JP PERF DRN SIL FLT 10MM FULL: Brand: CARDINAL HEALTH

## (undated) DEVICE — MEDI-VAC NON-CONDUCTIVE SUCTION TUBING: Brand: CARDINAL HEALTH

## (undated) DEVICE — SUT PDS LP 1 TP1 96IN VIO PDP880GA

## (undated) DEVICE — GLV SURG SENSICARE PI MIC PF SZ7.5 LF STRL

## (undated) DEVICE — MARYLAND JAW OPEN SEALER/DIVIDER COATED 23CM: Brand: LIGASURE

## (undated) DEVICE — 450 ML BOTTLE OF 0.05% CHLORHEXIDINE GLUCONATE IN 99.95% STERILE WATER FOR IRRIGATION, USP AND APPLICATOR.: Brand: IRRISEPT ANTIMICROBIAL WOUND LAVAGE

## (undated) DEVICE — COVER,LIGHT HANDLE,FLX,1/PK: Brand: MEDLINE INDUSTRIES, INC.

## (undated) DEVICE — DRSNG PAD ABD 8X10IN STRL

## (undated) DEVICE — SUT ETHLN 2/0 PS 18IN 585H

## (undated) DEVICE — CLTH CLENS READYCLEANSE PERI CARE PK/5

## (undated) DEVICE — SINGLE-USE BIOPSY FORCEPS: Brand: RADIAL JAW 4

## (undated) DEVICE — INTRO ACCSR BLNT TP

## (undated) DEVICE — GLV SURG SENSICARE PI MIC PF SZ7 LF STRL

## (undated) DEVICE — JACKSON-PRATT 100CC BULB RESERVOIR: Brand: CARDINAL HEALTH

## (undated) DEVICE — SAFESECURE,SECUREMENT,FOLEY CATH,STERILE: Brand: MEDLINE

## (undated) DEVICE — SUT PROLN 2/0 PC3 8833H

## (undated) DEVICE — SUT SILK 2/0 TIES 18IN A185H

## (undated) DEVICE — CVR HNDL LIGHT RIGID

## (undated) DEVICE — SUT SILK 3/0 TIES 18IN A184H

## (undated) DEVICE — TOTAL TRAY, 16FR 10ML SIL FOLEY, URN: Brand: MEDLINE

## (undated) DEVICE — SUT PDS O CT1 CR/8 18IN Z740D

## (undated) DEVICE — MEDI-VAC YANKAUER SUCTION HANDLE W/BULBOUS TIP: Brand: CARDINAL HEALTH

## (undated) DEVICE — THE BITE BLOCK MAXI, LATEX FREE STRAP IS USED TO PROTECT THE ENDOSCOPE INSERTION TUBE FROM BEING BITTEN BY THE PATIENT.

## (undated) DEVICE — PROVIDES A STERILE INTERFACE BETWEEN THE OPERATING ROOM SURGICAL LAMPS (NON-STERILE) AND THE SURGEON OR NURSE (STERILE).: Brand: STERION®CLAMP COVER FABRIC

## (undated) DEVICE — SUT SILK 3/0 SH CR8 18IN C013D

## (undated) DEVICE — MEDI-VAC YANKAUER SUCTION HANDLE: Brand: CARDINAL HEALTH

## (undated) DEVICE — CANN NASL CO2 DIVIDED A/

## (undated) DEVICE — DRSNG WND GZ PAD BORDERED 4X8IN STRL

## (undated) DEVICE — SPNG LAP PREWSH SFTPK 18X18IN STRL PK/5

## (undated) DEVICE — ANTIBACTERIAL UNDYED BRAIDED (POLYGLACTIN 910), SYNTHETIC ABSORBABLE SUTURE: Brand: COATED VICRYL

## (undated) DEVICE — BANDAGE,GAUZE,BULKEE II,4.5"X4.1YD,STRL: Brand: MEDLINE

## (undated) DEVICE — SPNG GZ WOVN 4X4IN 12PLY 10/BX STRL

## (undated) DEVICE — PREMIUM DRY TRAY LF: Brand: MEDLINE INDUSTRIES, INC.

## (undated) DEVICE — LEX GENERAL ABDOMINAL SPLIT: Brand: MEDLINE INDUSTRIES, INC.